# Patient Record
Sex: FEMALE | Race: WHITE | NOT HISPANIC OR LATINO | Employment: FULL TIME | URBAN - METROPOLITAN AREA
[De-identification: names, ages, dates, MRNs, and addresses within clinical notes are randomized per-mention and may not be internally consistent; named-entity substitution may affect disease eponyms.]

---

## 2023-04-03 ENCOUNTER — HOSPITAL ENCOUNTER (OUTPATIENT)
Dept: DATA CONVERSION | Facility: HOSPITAL | Age: 68
End: 2023-04-03
Attending: ORTHOPAEDIC SURGERY | Admitting: ORTHOPAEDIC SURGERY
Payer: COMMERCIAL

## 2023-04-03 DIAGNOSIS — I10 ESSENTIAL (PRIMARY) HYPERTENSION: ICD-10-CM

## 2023-04-03 DIAGNOSIS — M75.102 UNSPECIFIED ROTATOR CUFF TEAR OR RUPTURE OF LEFT SHOULDER, NOT SPECIFIED AS TRAUMATIC: ICD-10-CM

## 2023-04-03 DIAGNOSIS — F41.9 ANXIETY DISORDER, UNSPECIFIED: ICD-10-CM

## 2023-04-03 DIAGNOSIS — Z87.891 PERSONAL HISTORY OF NICOTINE DEPENDENCE: ICD-10-CM

## 2023-04-03 DIAGNOSIS — S46.012A STRAIN OF MUSCLE(S) AND TENDON(S) OF THE ROTATOR CUFF OF LEFT SHOULDER, INITIAL ENCOUNTER: ICD-10-CM

## 2023-04-03 DIAGNOSIS — S43.432A SUPERIOR GLENOID LABRUM LESION OF LEFT SHOULDER, INITIAL ENCOUNTER: ICD-10-CM

## 2023-04-03 DIAGNOSIS — S46.112A STRAIN OF MUSCLE, FASCIA AND TENDON OF LONG HEAD OF BICEPS, LEFT ARM, INITIAL ENCOUNTER: ICD-10-CM

## 2023-04-03 DIAGNOSIS — M94.212 CHONDROMALACIA, LEFT SHOULDER: ICD-10-CM

## 2023-04-03 DIAGNOSIS — K21.9 GASTRO-ESOPHAGEAL REFLUX DISEASE WITHOUT ESOPHAGITIS: ICD-10-CM

## 2023-09-14 NOTE — H&P
History & Physical Reviewed:   I have reviewed the History and Physical dated:  30-Mar-2023   History and Physical reviewed and relevant findings noted. Patient examined to review pertinent physical  findings.: No significant changes   Home Medications Reviewed: no changes noted   Allergies Reviewed: no changes noted       ERAS (Enhanced Recovery After Surgery):  ·  ERAS Patient: no     Consent:   COVID-19 Consent:  ·  COVID-19 Risk Consent Surgeon has reviewed key risks related to the risk of racheal COVID-19 and if they contract COVID-19 what the risks are.       Electronic Signatures:  Franko Millard)  (Signed 03-Apr-2023 08:03)   Authored: History & Physical Reviewed, ERAS, Consent,  Note Completion      Last Updated: 03-Apr-2023 08:03 by Franko Millard)

## 2023-10-02 NOTE — OP NOTE
Post Operative Note:     PreOp Diagnosis: L SHOULDER RTC TEAR, BICEPS TENODESIS   Post-Procedure Diagnosis: same   Procedure: L S SCOPE RTC REPAIR, BICEPS TENODESIS,  EXT LUCAS, SAD   Surgeon: ELSY   Resident/Fellow/Other Assistant: TARA   Anesthesia: GEN/SCALENE   Estimated Blood Loss (mL): 3   Specimen: no   Findings: SEE DX     Attestation:   Note Completion:  Attending Attestation I performed the procedure without a resident         Electronic Signatures:  Franko Millard)  (Signed 03-Apr-2023 09:37)   Authored: Post Operative Note, Note Completion      Last Updated: 03-Apr-2023 09:37 by Franko Millard)

## 2024-01-10 NOTE — PROGRESS NOTES
[facility]  Bianca Cornell female   1955 68 y.o.   [id number]      Chief Complaint  Follow up annual mammogram and exam.     History Of Present Illness  Bianca Cornell is a very pleasant 68 y.o.  female followed in the breast center for high risk surveillance care. She was diagnosed with right breast atypical ductal hyperplasia (ADH). On 2022 Dr. Kendrick performed a right breast excisional biopsy. She has no family history of breast cancer.     BREAST IMAGIN2024 Left diagnostic mammogram with ultrasound, indicates BI-RADS Category 4. Suspicious left breast mass without axillary lymphadenopathy. Further evaluation with surgical consultation and ultrasound-guided biopsy is recommended.     REPRODUCTIVE HISTORY: menarche age 15, , first birth age 18, did not breastfeed, no OCP's, natural menopause age 51, no HRT, heterogeneously dense    FAMILY CANCER HISTORY:   none    Review of Systems  Constitutional:  Negative for appetite change, fatigue, fever and unexpected weight change.   HENT:  Negative for ear pain, hearing loss, nosebleeds, sore throat and trouble swallowing.    Eyes:  Negative for discharge, itching and visual disturbance.   Breast: As stated in HPI.  Respiratory:  Negative for cough, chest tightness and shortness of breath.    Cardiovascular:  Negative for chest pain, palpitations and leg swelling.   Gastrointestinal:  Negative for abdominal pain, constipation, diarrhea and nausea.   Endocrine: Negative for cold intolerance and heat intolerance.   Genitourinary:  Negative for dysuria, frequency, hematuria, pelvic pain and vaginal bleeding.   Musculoskeletal:  Negative for arthralgias, back pain, gait problem, joint swelling and myalgias.   Skin:  Negative for color change and rash.   Allergic/Immunologic: Negative for environmental allergies and food allergies.   Neurological:  Negative for dizziness, tremors, speech difficulty, weakness, numbness and  headaches.   Hematological:  Does not bruise/bleed easily.   Psychiatric/Behavioral:  Negative for agitation, dysphoric mood and sleep disturbance. The patient is not nervous/anxious.       Past Medical History  She has a past medical history of Personal history of other diseases of the female genital tract and Unspecified benign mammary dysplasia of unspecified breast (2021).    Surgical History  She has a past surgical history that includes Appendectomy (2013); Other surgical history (2021); and BI mammo guided breast right localization (Right, 2022).     Family History  Cancer-related family history is not on file.     Social History  Social History     Tobacco Use    Smoking status: Former     Types: Cigarettes     Quit date: 2014     Years since quittin.0    Smokeless tobacco: Not on file   Substance Use Topics    Alcohol use: Not on file        Allergies  Allergies   Allergen Reactions    Clindamycin Nausea/vomiting     oral    Lisinopril Nausea/vomiting    Morphine Rash and Nausea/vomiting     Medications  Current Outpatient Medications   Medication Instructions    doxycycline (ADOXA) 100 mg, oral, 2 times daily, Take with a full glass of water and do not lie down for at least 30 minutes after    hydrALAZINE (APRESOLINE) 25 mg, oral    sulfamethoxazole-trimethoprim (Bactrim DS) 800-160 mg tablet 1 tablet, oral, 2 times daily      Last Recorded Vitals  Blood pressure 138/82, pulse 69, temperature 36 °C (96.8 °F), weight 97.5 kg (215 lb).    Physical Exam  Chest:       Patient is alert and oriented x3 and in a relaxed and appropriate mood. Her gait is steady and hand grasps are equal. Sclera is clear. The breasts are nearly symmetrical, large, and pendulous. Right breast has a well healed excisional biopsy incision. The tissue is soft without palpable abnormalities, discrete nodules or masses. The skin and nipples appear normal. There is no cervical, supraclavicular or axillary  lymphadenopathy. Heart rate and rhythm normal, S1 and S2 appreciated. The lungs are clear to auscultation bilaterally. Abdomen is soft and non-tender.      Relevant Results and Imaging  Study Result    Narrative & Impression   Interpreted By:  Salima Mendoza and Avery Ross   STUDY:  BI MAMMO LEFT DIAGNOSTIC TOMOSYNTHESIS; BI US BREAST LIMITED LEFT;  1/11/2024 11:59 am; 1/11/2024 12:32 pm      ACCESSION NUMBER(S):  [id number]; [id number]      ORDERING CLINICIAN:  ILIANA BEE      INDICATION:  The patient was recalled from recent screening mammogram 01/11/2024  for a left breast mass.      COMPARISON:  01/11/2024, 08/30/2022, 01/21/2022.      FINDINGS:  MAMMOGRAPHY: 2D and tomosynthesis images were reviewed at 1 mm slice  thickness.      Density:  The breast tissue is heterogeneously dense, which may  obscure small masses.      Spot compression views demonstrate an irregular spiculated equal  density mass in the central medial left breast at anterior depth.      ULTRASOUND:  A targeted ultrasound of the left breast and axilla was  performed using elastography.      An irregular not parallel hypoechoic mass with angular margins and  posterior shadowing is seen at 9 o'clock 5 cm from the nipple. The  mass measures 0.3 x 0.2 x 0.4 cm. It is avascular and soft on  elastography. This corresponds with the mammographic left breast mass.      A targeted ultrasound of the left axilla demonstrates 3  morphologically normal axillary lymph nodes.      IMPRESSION:  Suspicious left breast mass without axillary lymphadenopathy. Further  evaluation with surgical consultation and ultrasound-guided biopsy is  recommended. The patient is scheduled to see Iliana Smith in  clinic today to discuss the findings and recommendations. A message  was sent to the referring practitioner at the time of this dictation  regarding these critical findings using the Epic notification system.  A pre-procedure form was filled out.       Method of Detection: Category Sdbt - 3D Screening      BI-RADS CATEGORY:      BI-RADS Category:  4 Suspicious.  Recommendation:  Biopsy.  Recommended Date:  Immediate.  Laterality:  Left.      For any future breast imaging appointments, please call [phone] ([phone]).      I personally reviewed the images/study and I agree with the findings  as stated by fellow physician, Dr. Donnell Mcgraw.          MACRO:  Critical Finding:  Breast Imaging Abnormality. Notification was  initiated on 1/11/2024 at 12:35 pm by  Donnell Mcgraw.  (**-YCF-**)  Instructions:  See Impression for specific recommendations.           Time was spent viewing digital images. I explained the results in depth, along with suggested explanation for follow up recommendations based on the testing results. BI-RADS Category 4    Orders  Orders Placed This Encounter   Procedures    BI mammo left diagnostic tomosynthesis     Standing Status:   Future     Number of Occurrences:   1     Standing Expiration Date:   3/11/2025     Order Specific Question:   Reason for exam:     Answer:   left breast mass     Order Specific Question:   Radiologist to Determine Optimal Study     Answer:   Yes     Order Specific Question:   Release result to [employer]     Answer:   Immediate [1]     Order Specific Question:   Is this exam part of a Research Study? If Yes, link this order to the research study     Answer:   No    BI US breast limited left     Standing Status:   Future     Number of Occurrences:   1     Standing Expiration Date:   3/11/2025     Order Specific Question:   Reason for exam:     Answer:   left breast mass     Order Specific Question:   Radiologist to Determine Optimal Study     Answer:   Yes     Order Specific Question:   Release result to [facility]     Answer:   Immediate [1]     Order Specific Question:   Is this exam part of a Research Study? If Yes, link this order to the research study     Answer:   No    BI US guided breast localization and biopsy left      Standing Status:   Future     Standing Expiration Date:   3/11/2025     Order Specific Question:   Reason for exam:     Answer:   abnormal mammogram     Order Specific Question:   Radiologist to Determine Optimal Study     Answer:   Yes     Order Specific Question:   Release result to [facility]     Answer:   Immediate     Order Specific Question:   Is this exam part of a Research Study? If Yes, link this order to the research study     Answer:   No        Visit Diagnosis  1. Breast cancer screening, high risk patient        2. Abnormal finding on breast imaging  BI mammo left diagnostic tomosynthesis    BI US breast limited left      3. Atypical ductal hyperplasia of breast        4. Dense breast tissue          Assessment/Plan  High risk surveillance care, left breast mass, history right excisional breast biopsy for ADH, no family history of breast cancer, heterogeneously dense    Plan:  Left breast ultrasound guided core biopsy. Declines endocrine therapy. Declines MRI d/t gadolinium reaction of hives.     Patient Discussion/Summary  Left breast ultrasound guided core biopsy. A breast radiology physician will perform the procedure. Possible diagnoses include benign, atypia or cancer. Bruising and mild discomfort after the biopsy is normal and will improve. I typically have results in 5-7 business days. I will call you with the results, please have your phone handy to take my call. I will provide recommendations for future follow up based on your biopsy results.     High risk breast surveillance care plan:  Yearly mammogram with digital breast tomosynthesis  Twice yearly clinical breast examinations  Breast MRI (to schedule call [phone])  Monthly self breast examinations &/or regular self breast awareness  Vitamin D3 2000 IU/daily (over the counter) unless your PCP recommends you take a specific dose  Exercise 3-4 times per week for 45-60 minutes  Limit alcohol to 3-4 drinks per week if you are  menopausal  Eat healthy low-fat diet with lots of vegetable & fruits  Risk models indicate personal risk of breast cancer in the next 5 years and  lifetime (age 85-90):  Breast Cancer Risk Assessment Tool (Lizet): 5-year risk 2.6% (average 2.1%), lifetime risk 8.6% (average 7.2%).   Logan-Sunny: 5-year risk 10.8% (average 1.7%), lifetime risk 29%, (average 5.2%)    Risk model indicates you are eligible for endocrine therapy with Tamoxifen, Raloxifene or Aromatase inhibitor. Endocrine therapy reduces lifetime risk of breast cancer by up to 50% when taken for 5 years. There is an alternative low dose Tamoxifen which can be taken for only 3 years.      IMPORTANT INFORMATION REGARDING YOUR RESULTS    You can see your health information, review clinical summaries from office visits & test results online when you follow your health with MY  Chart, a personal health record. To sign up go to [email or web address]. If you need assistance with signing up or trouble getting into your account call [facility] Patient Line 24/7 at [phone].    My office phone number is [phone] if you need to get in touch with me or have additional questions or concerns. Thank you for choosing [facility] and trusting me as your healthcare provider. I look forward to seeing you again at your next office visit. I am honored to be a provider on your health care team and I remain dedicated to helping you achieve your health goals.    Lora Zhou, APRN-CNP

## 2024-01-11 ENCOUNTER — OFFICE VISIT (OUTPATIENT)
Dept: SURGICAL ONCOLOGY | Facility: HOSPITAL | Age: 69
End: 2024-01-11
Payer: COMMERCIAL

## 2024-01-11 ENCOUNTER — TELEPHONE (OUTPATIENT)
Dept: RADIOLOGY | Facility: HOSPITAL | Age: 69
End: 2024-01-11

## 2024-01-11 ENCOUNTER — HOSPITAL ENCOUNTER (OUTPATIENT)
Dept: RADIOLOGY | Facility: HOSPITAL | Age: 69
Discharge: HOME | End: 2024-01-11
Payer: COMMERCIAL

## 2024-01-11 VITALS
HEART RATE: 69 BPM | WEIGHT: 215 LBS | BODY MASS INDEX: 33.67 KG/M2 | TEMPERATURE: 96.8 F | DIASTOLIC BLOOD PRESSURE: 82 MMHG | SYSTOLIC BLOOD PRESSURE: 138 MMHG

## 2024-01-11 DIAGNOSIS — R92.8 ABNORMAL FINDING ON BREAST IMAGING: ICD-10-CM

## 2024-01-11 DIAGNOSIS — N60.99 ATYPICAL DUCTAL HYPERPLASIA OF BREAST: ICD-10-CM

## 2024-01-11 DIAGNOSIS — Z12.39 BREAST CANCER SCREENING, HIGH RISK PATIENT: Primary | ICD-10-CM

## 2024-01-11 DIAGNOSIS — R92.30 DENSE BREAST TISSUE: ICD-10-CM

## 2024-01-11 DIAGNOSIS — Z12.31 SCREENING MAMMOGRAM FOR HIGH-RISK PATIENT: ICD-10-CM

## 2024-01-11 PROCEDURE — 99214 OFFICE O/P EST MOD 30 MIN: CPT | Performed by: NURSE PRACTITIONER

## 2024-01-11 PROCEDURE — 3008F BODY MASS INDEX DOCD: CPT | Performed by: NURSE PRACTITIONER

## 2024-01-11 PROCEDURE — 77067 SCR MAMMO BI INCL CAD: CPT

## 2024-01-11 PROCEDURE — 77067 SCR MAMMO BI INCL CAD: CPT | Mod: DISTINCT PROCEDURAL SERVICE | Performed by: RADIOLOGY

## 2024-01-11 PROCEDURE — 77061 BREAST TOMOSYNTHESIS UNI: CPT | Mod: LT,GG

## 2024-01-11 PROCEDURE — 1159F MED LIST DOCD IN RCRD: CPT | Performed by: NURSE PRACTITIONER

## 2024-01-11 PROCEDURE — 1126F AMNT PAIN NOTED NONE PRSNT: CPT | Performed by: NURSE PRACTITIONER

## 2024-01-11 PROCEDURE — 76642 ULTRASOUND BREAST LIMITED: CPT | Mod: LT

## 2024-01-11 PROCEDURE — 1160F RVW MEDS BY RX/DR IN RCRD: CPT | Performed by: NURSE PRACTITIONER

## 2024-01-11 PROCEDURE — 76982 USE 1ST TARGET LESION: CPT | Mod: LT

## 2024-01-11 PROCEDURE — 77063 BREAST TOMOSYNTHESIS BI: CPT | Mod: DISTINCT PROCEDURAL SERVICE | Performed by: RADIOLOGY

## 2024-01-11 RX ORDER — HYDRALAZINE HYDROCHLORIDE 25 MG/1
25 TABLET, FILM COATED ORAL 3 TIMES DAILY
COMMUNITY

## 2024-01-11 RX ORDER — SULFAMETHOXAZOLE AND TRIMETHOPRIM 800; 160 MG/1; MG/1
1 TABLET ORAL DAILY
COMMUNITY

## 2024-01-11 RX ORDER — DOXYCYCLINE 100 MG/1
100 TABLET ORAL DAILY
COMMUNITY

## 2024-01-11 ASSESSMENT — ENCOUNTER SYMPTOMS
OCCASIONAL FEELINGS OF UNSTEADINESS: 1
LOSS OF SENSATION IN FEET: 0
DEPRESSION: 0

## 2024-01-11 ASSESSMENT — PAIN SCALES - GENERAL: PAINLEVEL: 0-NO PAIN

## 2024-01-11 NOTE — TELEPHONE ENCOUNTER
Spoke with pt about date, time location and parking, aware it is okay to eat drink and drive as long as not taking sedating medications or having another appointment with different instructions, reviewed pre, post and discharge instructions, instructed to wear most supportive bra and tylenol for discomfort. Verbalizing understanding , no questions allergies reviewed, denies use of blood thinning medications, does use fish oil, okay with lidocaine,

## 2024-01-12 ENCOUNTER — HOSPITAL ENCOUNTER (OUTPATIENT)
Dept: RADIOLOGY | Facility: HOSPITAL | Age: 69
Discharge: HOME | End: 2024-01-12
Payer: COMMERCIAL

## 2024-01-12 DIAGNOSIS — R92.8 ABNORMAL FINDING ON BREAST IMAGING: ICD-10-CM

## 2024-01-12 DIAGNOSIS — R92.8 OTHER ABNORMAL AND INCONCLUSIVE FINDINGS ON DIAGNOSTIC IMAGING OF BREAST: ICD-10-CM

## 2024-01-12 PROCEDURE — 88360 TUMOR IMMUNOHISTOCHEM/MANUAL: CPT | Performed by: PATHOLOGY

## 2024-01-12 PROCEDURE — 19081 BX BREAST 1ST LESION STRTCTC: CPT | Mod: LT

## 2024-01-12 PROCEDURE — 77065 DX MAMMO INCL CAD UNI: CPT | Mod: LEFT SIDE | Performed by: STUDENT IN AN ORGANIZED HEALTH CARE EDUCATION/TRAINING PROGRAM

## 2024-01-12 PROCEDURE — 88305 TISSUE EXAM BY PATHOLOGIST: CPT | Performed by: PATHOLOGY

## 2024-01-12 PROCEDURE — 2720000007 HC OR 272 NO HCPCS

## 2024-01-12 PROCEDURE — 77065 DX MAMMO INCL CAD UNI: CPT

## 2024-01-12 PROCEDURE — 88360 TUMOR IMMUNOHISTOCHEM/MANUAL: CPT | Mod: TC,SUR | Performed by: NURSE PRACTITIONER

## 2024-01-12 PROCEDURE — 2500000005 HC RX 250 GENERAL PHARMACY W/O HCPCS: Performed by: STUDENT IN AN ORGANIZED HEALTH CARE EDUCATION/TRAINING PROGRAM

## 2024-01-12 PROCEDURE — 19081 BX BREAST 1ST LESION STRTCTC: CPT | Mod: LEFT SIDE | Performed by: STUDENT IN AN ORGANIZED HEALTH CARE EDUCATION/TRAINING PROGRAM

## 2024-01-12 RX ADMIN — Medication 21 ML: at 10:40

## 2024-01-12 RX ADMIN — Medication 21 ML: at 11:40

## 2024-01-12 ASSESSMENT — PAIN - FUNCTIONAL ASSESSMENT: PAIN_FUNCTIONAL_ASSESSMENT: 0-10

## 2024-01-12 ASSESSMENT — PAIN SCALES - GENERAL: PAINLEVEL_OUTOF10: 0 - NO PAIN

## 2024-01-12 NOTE — Clinical Note
1030,reviewed US biopsy, allergies, okay with lidocaine , stopped fish oil 2 days ago  no other blood thinning medications, due to medical history may bleed easy, feels safe at home has not fallen in past 3 months , has fear of falling, no pain except joint pain uses tylenol, comfort measures provided, positioned, doctors unable to target area, reviewed with patient now would like to do a stereo biopsy, patient moved to stereo room, rev iewed procedure, verbalizing understanding, 1129 doctors in room, consent completed, 1130 patient positioned and site cleansed , 1140 lidocaine given by Dr. Mcgraw, biopsy completed pain 1/10, pressure held for 10 min, debrief @1053, doctors out of room 1054, site without bleeding steri strips and DSD applied, clip films completed and okayed, patient to prep recovery, reviewed discharge instructions given pamphlet and provider and nurse  line numbers, demonstrated ice placement, assisted with dressing, pt bought own surgical bra, verbalizing understanding no questions, pain 1/10 site remains D&I

## 2024-01-16 ENCOUNTER — TELEPHONE (OUTPATIENT)
Dept: SURGICAL ONCOLOGY | Facility: HOSPITAL | Age: 69
End: 2024-01-16
Payer: COMMERCIAL

## 2024-01-16 NOTE — TELEPHONE ENCOUNTER
Result Communication    Spoke with Bianca Cornell regarding breast biopsy results showing cancer. Office will call patient to schedule a surgical consult with the breast surgeon.     Resulted Orders   Surgical Pathology Exam   Result Value Ref Range    Case Report       Surgical Pathology                                Case: [id number]                                  Authorizing Provider:  FLORENCIA Hernandez    Collected:           01/12/2024 1144              Ordering Location:     [facility]       Received:            01/12/2024 1651                                     Center                                                                       Pathologist:           Colleen Wilson MD                                                         Specimen:    BREAST CORE BIOPSY LEFT, Left breast mass                                                  FINAL DIAGNOSIS       A.  Left breast mass, stereotactic guided core needle biopsy:  -- Invasive ductal carcinoma, grade 2, see note.    Note:  In this limited sample, the invasive carcinoma measures up to 4 mm in greatest dimension.  ER, MN and HER2 will be reported in an addendum.                By the signature on this report, the individual or group listed as making the Final Interpretation/Diagnosis certifies that they have reviewed this case.       Clinical History       Left breast stereotactic guided core biopsy (vacuum assisted) for left breast mass.       Gross Description       A: Received in formalin, labeled with the patient´s name and hospital number, are multiple irregular/cylindrical segments of yellow-white fatty soft tissue aggregating to 3.0 x 2.8 x 0.6 cm.  The specimen is submitted in toto in 2 cassettes.  M[facility]/RCC    NOTE:  Ischemia time: Not provided.  This specimen was placed into formalin at: 1/12/24 11:44.      Disclaimer       One or more of the reagents used to perform assays on this specimen MAY have contained components  considered to be analyte specific reagents (ASR's).  ASR's have not been cleared or approved by the U.S. Food and Drug Administration.  These assays were developed and their performance characteristics determined by the Department of Pathology at [facility]. The FDA does not require this test to go through premarket FDA review. This test is used for clinical purposes. It should not be regarded as investigational or for research. This laboratory is certified under the Clinical Laboratory Improvement Amendments (CLIA) as qualified to perform high complexity clinical laboratory testing.  The assays were performed with appropriate positive and negative controls which stained appropriately.         11:06 AM

## 2024-01-18 LAB
LAB AP ASR DISCLAIMER: NORMAL
LABORATORY COMMENT REPORT: NORMAL
PATH REPORT.ADDENDUM SPEC: NORMAL
PATH REPORT.FINAL DX SPEC: NORMAL
PATH REPORT.GROSS SPEC: NORMAL
PATH REPORT.RELEVANT HX SPEC: NORMAL
PATH REPORT.TOTAL CANCER: NORMAL

## 2024-01-24 ENCOUNTER — LAB (OUTPATIENT)
Dept: LAB | Facility: LAB | Age: 69
End: 2024-01-24
Payer: COMMERCIAL

## 2024-01-24 DIAGNOSIS — D80.1 NONFAMILIAL HYPOGAMMAGLOBULINEMIA (MULTI): Primary | ICD-10-CM

## 2024-01-24 LAB
ALBUMIN SERPL BCP-MCNC: 4 G/DL (ref 3.4–5)
ALP SERPL-CCNC: 71 U/L (ref 33–136)
ALT SERPL W P-5'-P-CCNC: 18 U/L (ref 7–45)
ANION GAP SERPL CALC-SCNC: 12 MMOL/L (ref 10–20)
AST SERPL W P-5'-P-CCNC: 15 U/L (ref 9–39)
BASOPHILS # BLD AUTO: 0.08 X10*3/UL (ref 0–0.1)
BASOPHILS NFR BLD AUTO: 0.9 %
BILIRUB SERPL-MCNC: 0.4 MG/DL (ref 0–1.2)
BUN SERPL-MCNC: 18 MG/DL (ref 6–23)
CALCIUM SERPL-MCNC: 9.7 MG/DL (ref 8.6–10.3)
CHLORIDE SERPL-SCNC: 103 MMOL/L (ref 98–107)
CO2 SERPL-SCNC: 26 MMOL/L (ref 21–32)
CREAT SERPL-MCNC: 0.84 MG/DL (ref 0.5–1.05)
EGFRCR SERPLBLD CKD-EPI 2021: 76 ML/MIN/1.73M*2
EOSINOPHIL # BLD AUTO: 0.27 X10*3/UL (ref 0–0.7)
EOSINOPHIL NFR BLD AUTO: 3.1 %
ERYTHROCYTE [DISTWIDTH] IN BLOOD BY AUTOMATED COUNT: 13.2 % (ref 11.5–14.5)
GLUCOSE SERPL-MCNC: 139 MG/DL (ref 74–99)
HCT VFR BLD AUTO: 41.6 % (ref 36–46)
HGB BLD-MCNC: 14.1 G/DL (ref 12–16)
IGA SERPL-MCNC: 275 MG/DL (ref 70–400)
IGG SERPL-MCNC: 805 MG/DL (ref 700–1600)
IGM SERPL-MCNC: 225 MG/DL (ref 40–230)
IMM GRANULOCYTES # BLD AUTO: 0.03 X10*3/UL (ref 0–0.7)
IMM GRANULOCYTES NFR BLD AUTO: 0.3 % (ref 0–0.9)
LYMPHOCYTES # BLD AUTO: 3.33 X10*3/UL (ref 1.2–4.8)
LYMPHOCYTES NFR BLD AUTO: 38.7 %
MCH RBC QN AUTO: 29.3 PG (ref 26–34)
MCHC RBC AUTO-ENTMCNC: 33.9 G/DL (ref 32–36)
MCV RBC AUTO: 86 FL (ref 80–100)
MONOCYTES # BLD AUTO: 0.51 X10*3/UL (ref 0.1–1)
MONOCYTES NFR BLD AUTO: 5.9 %
NEUTROPHILS # BLD AUTO: 4.39 X10*3/UL (ref 1.2–7.7)
NEUTROPHILS NFR BLD AUTO: 51.1 %
NRBC BLD-RTO: 0 /100 WBCS (ref 0–0)
PLATELET # BLD AUTO: 302 X10*3/UL (ref 150–450)
POTASSIUM SERPL-SCNC: 4.3 MMOL/L (ref 3.5–5.3)
PROT SERPL-MCNC: 7 G/DL (ref 6.4–8.2)
RBC # BLD AUTO: 4.82 X10*6/UL (ref 4–5.2)
SODIUM SERPL-SCNC: 137 MMOL/L (ref 136–145)
WBC # BLD AUTO: 8.6 X10*3/UL (ref 4.4–11.3)

## 2024-01-24 PROCEDURE — 85025 COMPLETE CBC W/AUTO DIFF WBC: CPT

## 2024-01-24 PROCEDURE — 82784 ASSAY IGA/IGD/IGG/IGM EACH: CPT

## 2024-01-24 PROCEDURE — 86317 IMMUNOASSAY INFECTIOUS AGENT: CPT

## 2024-01-24 PROCEDURE — 36415 COLL VENOUS BLD VENIPUNCTURE: CPT

## 2024-01-24 PROCEDURE — 80053 COMPREHEN METABOLIC PANEL: CPT

## 2024-01-27 LAB
S PN DA SERO 19F IGG SER-MCNC: 4.58 UG/ML
S PNEUM DA 1 IGG SER-MCNC: 0.81 UG/ML
S PNEUM DA 12F IGG SER-MCNC: 0.41 UG/ML
S PNEUM DA 14 IGG SER-MCNC: 8.43 UG/ML
S PNEUM DA 18C IGG SER-MCNC: 0.35 UG/ML
S PNEUM DA 23F IGG SER-MCNC: 1.73 UG/ML
S PNEUM DA 3 IGG SER-MCNC: 4.19 UG/ML
S PNEUM DA 4 IGG SER-MCNC: 0.61 UG/ML
S PNEUM DA 5 IGG SER-MCNC: 0.12 UG/ML
S PNEUM DA 6B IGG SER-MCNC: 0.57 UG/ML
S PNEUM DA 7F IGG SER-MCNC: 0.59 UG/ML
S PNEUM DA 8 IGG SER-MCNC: 0.33 UG/ML
S PNEUM DA 9N IGG SER-MCNC: 5.14 UG/ML
S PNEUM DA 9V IGG SER-MCNC: 1.16 UG/ML
S PNEUM SEROTYPE IGG SER-IMP: NORMAL

## 2024-01-31 NOTE — PROGRESS NOTES
BREAST SURGICAL ONCOLOGY NEW CANCER DIAGNOSIS    Assessment/Plan     Left breast IDC g2 ER 95 WY 95% HER2- at 9:00 5cmFN measuring 0.5cm  -[id number] stage IA    Discussed the pathology and treatment options with the patient and family.    First, we discussed surgical options: breast conservation vs mastectomy.      Breast conservation involves removal of the tumor with a rim of normal breast tissue called a margin.  BCT is often done in conjunction with radiation to decrease risk of recurrence.  Mastectomy involves removal of all breast tissue.  When mastectomy is recommended, referral to a plastic surgeon for reconstruction is recommended.      Given the small size of her cancer, she would be an excellent candidate for breast conservation.    We then discussed the role of axillary staging. We discussed sentinel lymph node biopsy, indications for axillary node dissection and risks and benefits of each procedure.     Next, we discussed adjuvant therapies to surgery.      Radiation is usually recommended when BCT is performed.  Women 70 or greater with early stage hormone receptor positive cancer may be able to omit radiation.  Radiation is recommended after mastectomy for tumors larger than 5cm, positive lymph nodes, or positive margins.      The need for chemotherapy will be based on surgical pathology and will be decided by the medical oncologist.  The medical oncologist is also responsible for prescribing endocrine therapy for hormone receptor positive tumors.     Bianca will be scheduled for left Magseed localized partial mastectomy and axillary sentinel lymph node biopsy on 3/1/2024.    She has an appointment with her immunologist, Dr. Denise, on 2/8/2024 to determine if she needs any additional intervention pre-operatively to minimize surgical site infection.  She is already on long term use antibiotics.      Risks, benefits, alternatives and perioperative expectations were discussed in detail including:  bleeding, infection, positive margin status necessitating return to OR, injury to nearby structures and risk of lymphedema with axillary surgery.    Subjective   Bianca Cornell is a 68 y.o. female presents today for evaluation of recently diagnosed carcinoma of the left breast.     The patient was initially referred for evaluation of an abnormal mammogram first noted 1/11/2024.     Follow-up diagnostic imaging on 1/11/2024 reveals a left breast mass at 9:0 5cmFN measuring 0.5cm.  Axilla scanned and WNL.     A stereotactic biopsy was performed on 1/12/2024 with pathology revealing infiltrating ductal carcinoma  rdGrdrrdarddrderd:rd3rd ER: 95% NV: 95% HER2: negative  ** Clip migration noted at time of biopsy, needs mamm loc for magseed**    Menarche: 15  AFLB: 18  Menopause: 51  HRT: no  Previous biopsies: Yes - right breast atypia  Previous breast surgeries: Yes - right MS excisional bx with Dr. Kendrick 2021.  Prior breast cancer: No    Family history: none    Review of Systems   Constitutional symptoms: Denies generalized fatigue.  Denies weight change, fevers/chills, difficulty sleeping   Eyes: Denies double vision, glaucoma, cataracts.  Ear/nose/throat/mouth: Denies hearing changes, sore throat, sinus problems.  Cardiovascular: No chest pain.  Denies irregular heartbeat.  Denies ankle swelling.  Respiratory: No wheezing, cough, or shortness of breath.  Gastrointestinal: No abdominal pain,  No nausea/vomiting.  No indigestion/heartburn.  No change in bowel habits.  No constipation or diarrhea.   Genitourinary: No urinary incontinence.  No urinary frequency.  No painful urination.  Musculoskeletal: No bone pain, no muscle pain, no joint pain.   Integumentary: No rash. No masses.  No changes in moles.  No easy bruising.  Neurological: No headaches.  No tremors. No numbness/tingling.  Psychiatric: No anxiety. No depression.  Endocrine: No excessive thirst.  Not too hot or too cold.  Not tired or fatigued.     Hematological/lymphatic: No swollen glands or blood clotting problems.  No bruising.    Objective   Physical Exam  General: Alert and oriented x 3.  Mood and affect are appropriate.  HEENT: EOMI, PERRLA.   Neck: supple, no masses, no cervical adenopathy.  Cardiovascular: no lower extremity edema.  Pulmonary: breathing non labored on room air.  GI: Abdomen soft, no masses. No hepatomegaly or splenomegaly.  Lymph nodes: No supraclavicular or axillary adenopathy bilaterally.  Musculoskeletal: Full range of motion in the upper extremities bilaterally.  Neuro: denies dizziness, tremors    Breasts: The breasts were examined in both the seated and supine positions.    RIGHT: The nipple is everted without nipple discharge.  There are no skin changes, skin thickening, or dimpling. There are no masses palpated in the RIGHT breast.   LEFT: The nipple is everted without nipple discharge.  There are no skin changes, skin thickening, or dimpling. There are no masses palpated in the LEFT breast.     Radiology review: All images and reports were personally reviewed.         Constance Sweeney, DO

## 2024-02-01 ENCOUNTER — OFFICE VISIT (OUTPATIENT)
Dept: SURGICAL ONCOLOGY | Facility: HOSPITAL | Age: 69
End: 2024-02-01
Payer: COMMERCIAL

## 2024-02-01 ENCOUNTER — APPOINTMENT (OUTPATIENT)
Dept: SURGICAL ONCOLOGY | Facility: HOSPITAL | Age: 69
End: 2024-02-01
Payer: COMMERCIAL

## 2024-02-01 VITALS — BODY MASS INDEX: 33.43 KG/M2 | WEIGHT: 213 LBS | HEIGHT: 67 IN | RESPIRATION RATE: 16 BRPM

## 2024-02-01 DIAGNOSIS — C50.112 MALIGNANT NEOPLASM OF CENTRAL PORTION OF LEFT BREAST IN FEMALE, ESTROGEN RECEPTOR POSITIVE (MULTI): ICD-10-CM

## 2024-02-01 DIAGNOSIS — Z17.0 MALIGNANT NEOPLASM OF CENTRAL PORTION OF LEFT BREAST IN FEMALE, ESTROGEN RECEPTOR POSITIVE (MULTI): ICD-10-CM

## 2024-02-01 PROCEDURE — 1126F AMNT PAIN NOTED NONE PRSNT: CPT | Performed by: SURGERY

## 2024-02-01 PROCEDURE — 3008F BODY MASS INDEX DOCD: CPT | Performed by: SURGERY

## 2024-02-01 PROCEDURE — 1159F MED LIST DOCD IN RCRD: CPT | Performed by: SURGERY

## 2024-02-01 PROCEDURE — 1036F TOBACCO NON-USER: CPT | Performed by: SURGERY

## 2024-02-01 PROCEDURE — 99215 OFFICE O/P EST HI 40 MIN: CPT | Performed by: SURGERY

## 2024-02-01 PROCEDURE — 1160F RVW MEDS BY RX/DR IN RCRD: CPT | Performed by: SURGERY

## 2024-02-01 RX ORDER — ACETAMINOPHEN 500 MG
TABLET ORAL
COMMUNITY
Start: 2021-11-11 | End: 2024-02-14 | Stop reason: ALTCHOICE

## 2024-02-02 ENCOUNTER — APPOINTMENT (OUTPATIENT)
Dept: SURGICAL ONCOLOGY | Facility: HOSPITAL | Age: 69
End: 2024-02-02
Payer: COMMERCIAL

## 2024-02-14 ENCOUNTER — LAB (OUTPATIENT)
Dept: LAB | Facility: LAB | Age: 69
End: 2024-02-14
Payer: COMMERCIAL

## 2024-02-14 ENCOUNTER — PRE-ADMISSION TESTING (OUTPATIENT)
Dept: PREADMISSION TESTING | Facility: HOSPITAL | Age: 69
End: 2024-02-14
Payer: COMMERCIAL

## 2024-02-14 VITALS
SYSTOLIC BLOOD PRESSURE: 135 MMHG | DIASTOLIC BLOOD PRESSURE: 77 MMHG | HEART RATE: 71 BPM | RESPIRATION RATE: 16 BRPM | HEIGHT: 67 IN | WEIGHT: 221.34 LBS | TEMPERATURE: 97.7 F | BODY MASS INDEX: 34.74 KG/M2 | OXYGEN SATURATION: 98 %

## 2024-02-14 DIAGNOSIS — C50.112 MALIGNANT NEOPLASM OF CENTRAL PORTION OF LEFT BREAST IN FEMALE, ESTROGEN RECEPTOR POSITIVE (MULTI): ICD-10-CM

## 2024-02-14 DIAGNOSIS — Z17.0 MALIGNANT NEOPLASM OF CENTRAL PORTION OF LEFT BREAST IN FEMALE, ESTROGEN RECEPTOR POSITIVE (MULTI): ICD-10-CM

## 2024-02-14 PROBLEM — D80.1 HYPOGAMMAGLOBULINEMIA (MULTI): Status: ACTIVE | Noted: 2024-02-14

## 2024-02-14 LAB
ANION GAP SERPL CALC-SCNC: 15 MMOL/L (ref 10–20)
BUN SERPL-MCNC: 14 MG/DL (ref 6–23)
CALCIUM SERPL-MCNC: 10.2 MG/DL (ref 8.6–10.3)
CHLORIDE SERPL-SCNC: 102 MMOL/L (ref 98–107)
CO2 SERPL-SCNC: 25 MMOL/L (ref 21–32)
CREAT SERPL-MCNC: 0.74 MG/DL (ref 0.5–1.05)
EGFRCR SERPLBLD CKD-EPI 2021: 88 ML/MIN/1.73M*2
ERYTHROCYTE [DISTWIDTH] IN BLOOD BY AUTOMATED COUNT: 13.4 % (ref 11.5–14.5)
GLUCOSE SERPL-MCNC: 88 MG/DL (ref 74–99)
HCT VFR BLD AUTO: 42.7 % (ref 36–46)
HGB BLD-MCNC: 14.4 G/DL (ref 12–16)
MCH RBC QN AUTO: 28.8 PG (ref 26–34)
MCHC RBC AUTO-ENTMCNC: 33.7 G/DL (ref 32–36)
MCV RBC AUTO: 85 FL (ref 80–100)
NRBC BLD-RTO: 0 /100 WBCS (ref 0–0)
PLATELET # BLD AUTO: 322 X10*3/UL (ref 150–450)
POTASSIUM SERPL-SCNC: 3.8 MMOL/L (ref 3.5–5.3)
RBC # BLD AUTO: 5 X10*6/UL (ref 4–5.2)
SODIUM SERPL-SCNC: 138 MMOL/L (ref 136–145)
WBC # BLD AUTO: 11.7 X10*3/UL (ref 4.4–11.3)

## 2024-02-14 PROCEDURE — 93010 ELECTROCARDIOGRAM REPORT: CPT | Performed by: INTERNAL MEDICINE

## 2024-02-14 PROCEDURE — 93005 ELECTROCARDIOGRAM TRACING: CPT

## 2024-02-14 PROCEDURE — 80048 BASIC METABOLIC PNL TOTAL CA: CPT

## 2024-02-14 PROCEDURE — 99203 OFFICE O/P NEW LOW 30 MIN: CPT | Performed by: NURSE PRACTITIONER

## 2024-02-14 PROCEDURE — 85027 COMPLETE CBC AUTOMATED: CPT

## 2024-02-14 RX ORDER — GLUCOSAM/CHONDRO/HERB 149/HYAL 750-100 MG
1 TABLET ORAL 2 TIMES DAILY
COMMUNITY

## 2024-02-14 RX ORDER — MULTIVIT-MIN/IRON FUM/FOLIC AC 7.5 MG-4
1 TABLET ORAL DAILY
COMMUNITY

## 2024-02-14 ASSESSMENT — CHADS2 SCORE
CHF: NO
HYPERTENSION: YES
CHADS2 SCORE: 1
PRIOR STROKE OR TIA OR THROMBOEMBOLISM: NO
DIABETES: NO
AGE GREATER THAN OR EQUAL TO 75: NO

## 2024-02-14 ASSESSMENT — DUKE ACTIVITY SCORE INDEX (DASI)
CAN YOU CLIMB A FLIGHT OF STAIRS OR WALK UP A HILL: YES
CAN YOU DO HEAVY WORK AROUND THE HOUSE LIKE SCRUBBING FLOORS OR LIFTING AND MOVING HEAVY FURNITURE: NO
DASI METS SCORE: 6.6
CAN YOU DO MODERATE WORK AROUND THE HOUSE LIKE VACUUMING, SWEEPING FLOORS OR CARRYING GROCERIES: YES
CAN YOU PARTICIPATE IN STRENOUS SPORTS LIKE SWIMMING, SINGLES TENNIS, FOOTBALL, BASKETBALL, OR SKIING: NO
CAN YOU HAVE SEXUAL RELATIONS: NO
CAN YOU WALK A BLOCK OR TWO ON LEVEL GROUND: YES
CAN YOU RUN A SHORT DISTANCE: YES
CAN YOU PARTICIPATE IN MODERATE RECREATIONAL ACTIVITIES LIKE GOLF, BOWLING, DANCING, DOUBLES TENNIS OR THROWING A BASEBALL OR FOOTBALL: NO
CAN YOU TAKE CARE OF YOURSELF (EAT, DRESS, BATHE, OR USE TOILET): YES
TOTAL_SCORE: 31.45
CAN YOU WALK INDOORS, SUCH AS AROUND YOUR HOUSE: YES
CAN YOU DO YARD WORK LIKE RAKING LEAVES, WEEDING OR PUSHING A MOWER: YES
CAN YOU DO LIGHT WORK AROUND THE HOUSE LIKE DUSTING OR WASHING DISHES: YES

## 2024-02-14 ASSESSMENT — ENCOUNTER SYMPTOMS
CARDIOVASCULAR NEGATIVE: 1
EYES NEGATIVE: 1
CONSTITUTIONAL NEGATIVE: 1
GASTROINTESTINAL NEGATIVE: 1
NECK NEGATIVE: 1
ENDOCRINE NEGATIVE: 1
RESPIRATORY NEGATIVE: 1

## 2024-02-14 ASSESSMENT — LIFESTYLE VARIABLES: SMOKING_STATUS: NONSMOKER

## 2024-02-14 ASSESSMENT — PAIN - FUNCTIONAL ASSESSMENT: PAIN_FUNCTIONAL_ASSESSMENT: 0-10

## 2024-02-14 ASSESSMENT — ACTIVITIES OF DAILY LIVING (ADL): ADL_SCORE: 0

## 2024-02-14 NOTE — H&P (VIEW-ONLY)
CPM/PAT Evaluation       Name: Bianca Cornell (Bianca Cornell)  /Age: 1955/69 y.o.     In-Person       Chief Complaint: breast cancer    HPI  This is pleasant 68 yo with Pmhx of anxiety, GERD, HTN, and left side breast cancer.   Pt had abnormal mammogram done  and breast biopsy done  + for invasive ductal carcinoma.  Pt denies recent fever or chills.      Past Medical History:   Diagnosis Date    Atypical ductal hyperplasia, breast     Breast cancer (CMS/HCC)     left    Breast cancer (CMS/HCC)     Hypertension     Hypogammaglobulinemia (CMS/HCC)     Necrotizing fasciitis (CMS/HCC)     right thigh     Personal history of other diseases of the female genital tract     History of endometriosis    PONV (postoperative nausea and vomiting)     Unspecified benign mammary dysplasia of unspecified breast 2021    Atypical ductal hyperplasia of breast       Past Surgical History:   Procedure Laterality Date    APPENDECTOMY  2013    Appendectomy    BI MAMMO GUIDED LOCALIZATION BREAST RIGHT Right 2022    BI MAMMO GUIDED LOCALIZATION BREAST RIGHT 2022 [facility]    BREAST BIOPSY      ENDOMETRIAL ABLATION      OTHER SURGICAL HISTORY  2021    Tubal ligation    SHOULDER SURGERY      TUBAL LIGATION         Patient  has no history on file for sexual activity.    Family History   Problem Relation Name Age of Onset    Diabetes Mother      Hypertension Father      Heart disease Father      Hypertension Brother      Heart attack Brother         Allergies   Allergen Reactions    Clindamycin Nausea/vomiting     Oral - caused pancreatitis per pt    Dexilant [Dexlansoprazole] Nausea/vomiting     caused pancreatitis per pt    Flagyl [Metronidazole] Nausea/vomiting     caused pancreatitis per pt    Lisinopril Nausea/vomiting     caused pancreatitis per pt    Morphine Rash and Nausea/vomiting       Prior to Admission medications    Medication Sig Start Date End Date Taking?  Authorizing Provider   Bacillus subtilis-inulin 1.5 billion cell-1 gram tablet,chewable Chew. 11/11/21   Historical Provider, MD   cholecalciferol (Vitamin D-3) 50 mcg (2,000 unit) capsule Take by mouth. 11/11/21   Historical Provider, MD   doxycycline (Adoxa) 100 mg tablet Take 1 tablet (100 mg) by mouth 2 times a day. Take with a full glass of water and do not lie down for at least 30 minutes after    Historical Provider, MD   hydrALAZINE (Apresoline) 25 mg tablet Take 1 tablet (25 mg) by mouth.    Historical Provider, MD   sulfamethoxazole-trimethoprim (Bactrim DS) 800-160 mg tablet Take 1 tablet by mouth 2 times a day.    Historical Provider, MD OCONNELL ROS:   Constitutional:   neg    Neuro/Psych:    Numbness in fingers  Eyes:   neg     use of corrective lenses  Ears:   neg    Nose:   neg    Mouth:   neg    Throat:   neg    Neck:   neg    Cardio:   neg    Respiratory:   neg    Endocrine:   neg    GI:   neg    :   neg    Musculoskeletal:    Pt has torn single tendon inside hamstring right LE /s/p therapy doing OK walking three miles a day  Hx of fall on left shoulder s/p surgery in April of 2023  Hematologic:   neg    Skin:  neg    ID: pt has hypogammaglobulinemia- recently had vaccines updated  Pt reports she has had multiple complex strep infections in past     Physical Exam  Constitutional:       Appearance: Normal appearance.   HENT:      Head: Normocephalic and atraumatic.      Nose: Nose normal.      Mouth/Throat:      Mouth: Mucous membranes are moist.   Eyes:      Pupils: Pupils are equal, round, and reactive to light.   Cardiovascular:      Rate and Rhythm: Normal rate and regular rhythm.   Pulmonary:      Effort: Pulmonary effort is normal.      Breath sounds: Normal breath sounds.   Abdominal:      General: Bowel sounds are normal.      Palpations: Abdomen is soft.   Musculoskeletal:         General: Normal range of motion.      Cervical back: Normal range of motion.   Skin:     General: Skin is  warm and dry.   Neurological:      General: No focal deficit present.      Mental Status: She is alert and oriented to person, place, and time.   Psychiatric:         Mood and Affect: Mood normal.         Behavior: Behavior normal.        Airway: ROM  Anesthesia:  PONV  Hx of motion sickness  Teeth: upper dentures  ECG: NSR    Visit Vitals  /77   Pulse 71   Temp 36.5 °C (97.7 °F) (Temporal)   Resp 16       DASI Risk Score      Flowsheet Row Most Recent Value   DASI SCORE 31.45   METS Score (Will be calculated only when all the questions are answered) 6.6          Caprini DVT Assessment      Flowsheet Row Most Recent Value   DVT Score 9   Current Status Major surgery planned, including arthroscopic and laproscopic (1-2 hours), Present cancer or chemotherapy   Age 60-75 years   BMI 31-40 (Obesity)          Modified Frailty Index      Flowsheet Row Most Recent Value   Modified Frailty Index Calculator .0909          CHADS2 Stroke Risk  Current as of 19 minutes ago        N/A 3 - 100%: High Risk   2 - 3%: Medium Risk   0 - 2%: Low Risk     Last Change: N/A          This score determines the patient's risk of having a stroke if the patient has atrial fibrillation.        This score is not applicable to this patient. Components are not calculated.          Revised Cardiac Risk Index    No data to display       Apfel Simplified Score    No data to display       Risk Analysis Index Results This Encounter         2/14/2024  1425             ALLRED Cancer History: Patient indicates history of cancer    Total Risk Analysis Index Score Without Cancer: 20    Total Risk Analysis Index Score: 34          Stop Bang Score      Flowsheet Row Most Recent Value   Do you snore loudly? 0   Do you often feel tired or fatigued after your sleep? 1   Has anyone ever observed you stop breathing in your sleep? 0   Do you have or are you being treated for high blood pressure? 1   Recent BMI (Calculated) 34.7   Is BMI greater than 35 kg/m2?  0=No   Age older than 50 years old? 1=Yes   Is your neck circumference greater than 17 inches (Male) or 16 inches (Female)? 0   Gender - Male 0=No   STOP-BANG Total Score 3            Assessment and Plan:     Plan for OR on 3/1/2024 LEFT MAG SEED LOCALIZED PARTIAL MASTECTOMY W/ SENTINEL LYMPH NODE BIOPSY   CBC BMP

## 2024-02-14 NOTE — PREPROCEDURE INSTRUCTIONS
Medication List            Accurate as of February 14, 2024  2:57 PM. Always use your most recent med list.                Bacillus subtilis-inulin 1.5 billion cell-1 gram tablet,chewable  Medication Adjustments for Surgery: Continue until night before surgery     CALCIUM 600 WITH VITAMIN D3 ORAL  Medication Adjustments for Surgery: Stop 7 days before surgery     doxycycline 100 mg tablet  Commonly known as: Adoxa  Notes to patient: Take as instructed     * Fish OiL 1,000 mg (120 mg-180 mg) capsule  Generic drug: omega 3-dha-epa-fish oil  Medication Adjustments for Surgery: Stop 7 days before surgery     * OMEGA-3 ORAL  Medication Adjustments for Surgery: Stop 7 days before surgery     hydrALAZINE 25 mg tablet  Commonly known as: Apresoline  Medication Adjustments for Surgery: Take morning of surgery with sip of water, no other fluids     multivitamin with minerals tablet  Medication Adjustments for Surgery: Stop 7 days before surgery     sulfamethoxazole-trimethoprim 800-160 mg tablet  Commonly known as: Bactrim DS  Notes to patient: Can take as prescribed           * This list has 2 medication(s) that are the same as other medications prescribed for you. Read the directions carefully, and ask your doctor or other care provider to review them with you.                                  PRE-OPERATIVE INSTRUCTIONS    You will receive notification one business day prior to your surgery to confirm your arrival time and additional information. It is important that you answer your phone and/or check your messages during this time.    Please enter the building through the Outpatient entrance. Take the elevator off the lobby to the 2nd floor and check in at the Outpatient Surgery desk    INSTRUCTIONS:  Talk to your surgeon for instructions if you should stop your aspirin, blood thinner, or diabetes medicines.  DO NOT take any multivitamins or over the counter supplements for 7-10 days before surgery.  If not being  admitted, you must have an adult immediately available to drive you home after surgery. We also highly recommend you have someone stay with you for the entire day and night of your surgery.  For children having surgery, a parent or legal guardian must accompany them to the surgery center. If this is not possible, please call [phone] to make additional arrangements.  For adults who are unable to consent or make medical decisions for themselves, a legal guardian or Power of  must accompany them to the surgery center. If this is not possible, please call [phone] to make additional arrangements.  Wear comfortable, loose fitting clothing.  All jewelry and piercings must be removed. If you are unable to remove an item or have a dermal piercing, please be sure to tell the nurse when you arrive for surgery.  Nail polish and make-up must be removed.  Avoid smoking or consuming alcohol for 24 hours before surgery.  To help prevent infection, please take a shower/bath and wash your hair the night before and/or morning of surgery.    Additional instructions about eating and drinking before surgery:  Do not eat any solid foods after midnight. Milk, nutritional drinks/supplements, and infant formula are considered solid foods.  You may drink up to 12 oz. of clear liquids up to 2 hours before your arrival time for surgery, unless directed otherwise by your surgeon. Clear liquids include water, non-carbonated sports drinks (Gatorade), black tea or coffee (no creamers) and breast milk.    If you received a blue folder, please review additional information provided inside the folder regarding additional preparation.     If you have any questions or concerns, please call Pre-Admission Testing at [phone].        Preoperative Bathing instructions    Follow these instructions the evening before and morning of surgery:  Do not shave the day before or day of surgery.  Remove all jewelry until after surgery. Take  off rings and take out all body-piercing jewelry.  Use a clean wash cloth and towel.  Wash your face and hair with your normal soap and shampoo before you use the CHG soap.  Use a wash cloth to clean your skin with the CHG soap. Use enough CHG soap to cover the skin on your whole body. Use the same amount as you would with your normal soap.  Do not use the CHG soap on your face, eyes, ears, or head.  Do not scrub your skin too hard.  Be sure to clean the area well where surgery will be done.  Do not wash with your normal soap after the CHG soap.  Keep away from the water stream when you put CHG soap on. This keeps it from rinsing off too soon.  Rinse your body well.  Pat yourself dry with a clean, soft towel.  Put on clean clothing.  Do not put on any deodorants, lotions, or oils after showering. These might block how the CHG soap works.

## 2024-02-21 LAB
ATRIAL RATE: 62 BPM
P AXIS: 55 DEGREES
P OFFSET: 196 MS
P ONSET: 143 MS
PR INTERVAL: 170 MS
Q ONSET: 228 MS
QRS COUNT: 11 BEATS
QRS DURATION: 84 MS
QT INTERVAL: 418 MS
QTC CALCULATION(BAZETT): 424 MS
QTC FREDERICIA: 422 MS
R AXIS: 9 DEGREES
T AXIS: 56 DEGREES
T OFFSET: 437 MS
VENTRICULAR RATE: 62 BPM

## 2024-02-23 ENCOUNTER — HOSPITAL ENCOUNTER (OUTPATIENT)
Dept: RADIOLOGY | Facility: HOSPITAL | Age: 69
Discharge: HOME | End: 2024-02-23
Payer: COMMERCIAL

## 2024-02-23 DIAGNOSIS — C50.112 MALIGNANT NEOPLASM OF CENTRAL PORTION OF LEFT BREAST IN FEMALE, ESTROGEN RECEPTOR POSITIVE (MULTI): ICD-10-CM

## 2024-02-23 DIAGNOSIS — Z17.0 MALIGNANT NEOPLASM OF CENTRAL PORTION OF LEFT BREAST IN FEMALE, ESTROGEN RECEPTOR POSITIVE (MULTI): ICD-10-CM

## 2024-02-23 PROCEDURE — 19281 PERQ DEVICE BREAST 1ST IMAG: CPT | Mod: LT

## 2024-02-23 PROCEDURE — 2720000007 HC OR 272 NO HCPCS

## 2024-02-23 PROCEDURE — 2500000005 HC RX 250 GENERAL PHARMACY W/O HCPCS: Performed by: RADIOLOGY

## 2024-02-23 PROCEDURE — A4648 IMPLANTABLE TISSUE MARKER: HCPCS

## 2024-02-23 PROCEDURE — 19281 PERQ DEVICE BREAST 1ST IMAG: CPT | Mod: LEFT SIDE | Performed by: RADIOLOGY

## 2024-02-23 PROCEDURE — 2780000003 HC OR 278 NO HCPCS

## 2024-02-23 PROCEDURE — 77065 DX MAMMO INCL CAD UNI: CPT | Mod: LT

## 2024-02-23 RX ADMIN — Medication 10 ML: at 10:15

## 2024-02-28 ENCOUNTER — TELEPHONE (OUTPATIENT)
Dept: HEMATOLOGY/ONCOLOGY | Facility: HOSPITAL | Age: 69
End: 2024-02-28
Payer: COMMERCIAL

## 2024-02-28 NOTE — TELEPHONE ENCOUNTER
Pt called the medical oncology RN triage line asking about her appts on 3/1 and 3/18. These appts appear to be with Constance Sweeney DO. I transferred her call to that office to assist.

## 2024-03-01 ENCOUNTER — ANESTHESIA EVENT (OUTPATIENT)
Dept: OPERATING ROOM | Facility: HOSPITAL | Age: 69
End: 2024-03-01
Payer: COMMERCIAL

## 2024-03-01 ENCOUNTER — HOSPITAL ENCOUNTER (OUTPATIENT)
Dept: RADIOLOGY | Facility: HOSPITAL | Age: 69
Discharge: HOME | End: 2024-03-01
Payer: COMMERCIAL

## 2024-03-01 ENCOUNTER — HOSPITAL ENCOUNTER (OUTPATIENT)
Facility: HOSPITAL | Age: 69
Setting detail: OUTPATIENT SURGERY
Discharge: HOME | End: 2024-03-01
Attending: SURGERY | Admitting: SURGERY
Payer: COMMERCIAL

## 2024-03-01 ENCOUNTER — ANESTHESIA (OUTPATIENT)
Dept: OPERATING ROOM | Facility: HOSPITAL | Age: 69
End: 2024-03-01
Payer: COMMERCIAL

## 2024-03-01 VITALS
DIASTOLIC BLOOD PRESSURE: 80 MMHG | BODY MASS INDEX: 33.59 KG/M2 | WEIGHT: 214 LBS | SYSTOLIC BLOOD PRESSURE: 155 MMHG | RESPIRATION RATE: 18 BRPM | HEART RATE: 77 BPM | TEMPERATURE: 96.8 F | OXYGEN SATURATION: 97 % | HEIGHT: 67 IN

## 2024-03-01 DIAGNOSIS — C50.112 MALIGNANT NEOPLASM OF CENTRAL PORTION OF LEFT BREAST IN FEMALE, ESTROGEN RECEPTOR POSITIVE (MULTI): ICD-10-CM

## 2024-03-01 DIAGNOSIS — C50.112 MALIGNANT NEOPLASM OF CENTRAL PORTION OF LEFT BREAST IN FEMALE, ESTROGEN RECEPTOR POSITIVE (MULTI): Primary | ICD-10-CM

## 2024-03-01 DIAGNOSIS — Z17.0 MALIGNANT NEOPLASM OF CENTRAL PORTION OF LEFT BREAST IN FEMALE, ESTROGEN RECEPTOR POSITIVE (MULTI): ICD-10-CM

## 2024-03-01 DIAGNOSIS — Z17.0 MALIGNANT NEOPLASM OF CENTRAL PORTION OF LEFT BREAST IN FEMALE, ESTROGEN RECEPTOR POSITIVE (MULTI): Primary | ICD-10-CM

## 2024-03-01 PROCEDURE — 3600000004 HC OR TIME - INITIAL BASE CHARGE - PROCEDURE LEVEL FOUR: Performed by: SURGERY

## 2024-03-01 PROCEDURE — 88341 IMHCHEM/IMCYTCHM EA ADD ANTB: CPT | Performed by: PATHOLOGY

## 2024-03-01 PROCEDURE — 38900 IO MAP OF SENT LYMPH NODE: CPT | Performed by: SURGERY

## 2024-03-01 PROCEDURE — 76098 X-RAY EXAM SURGICAL SPECIMEN: CPT | Mod: LT

## 2024-03-01 PROCEDURE — 38792 RA TRACER ID OF SENTINL NODE: CPT | Performed by: SURGERY

## 2024-03-01 PROCEDURE — 7100000010 HC PHASE TWO TIME - EACH INCREMENTAL 1 MINUTE: Performed by: SURGERY

## 2024-03-01 PROCEDURE — 76098 X-RAY EXAM SURGICAL SPECIMEN: CPT | Mod: LEFT SIDE | Performed by: RADIOLOGY

## 2024-03-01 PROCEDURE — 2720000007 HC OR 272 NO HCPCS: Performed by: SURGERY

## 2024-03-01 PROCEDURE — 88307 TISSUE EXAM BY PATHOLOGIST: CPT | Mod: TC,STJLAB | Performed by: SURGERY

## 2024-03-01 PROCEDURE — 7100000002 HC RECOVERY ROOM TIME - EACH INCREMENTAL 1 MINUTE: Performed by: SURGERY

## 2024-03-01 PROCEDURE — A38525 PR BX/REMV,LYMPH NODE,DEEP AXILL: Performed by: ANESTHESIOLOGY

## 2024-03-01 PROCEDURE — 2500000004 HC RX 250 GENERAL PHARMACY W/ HCPCS (ALT 636 FOR OP/ED): Mod: JW | Performed by: SURGERY

## 2024-03-01 PROCEDURE — 2500000004 HC RX 250 GENERAL PHARMACY W/ HCPCS (ALT 636 FOR OP/ED): Performed by: NURSE ANESTHETIST, CERTIFIED REGISTERED

## 2024-03-01 PROCEDURE — 3700000001 HC GENERAL ANESTHESIA TIME - INITIAL BASE CHARGE: Performed by: SURGERY

## 2024-03-01 PROCEDURE — A38525 PR BX/REMV,LYMPH NODE,DEEP AXILL: Performed by: NURSE ANESTHETIST, CERTIFIED REGISTERED

## 2024-03-01 PROCEDURE — 3600000009 HC OR TIME - EACH INCREMENTAL 1 MINUTE - PROCEDURE LEVEL FOUR: Performed by: SURGERY

## 2024-03-01 PROCEDURE — 88342 IMHCHEM/IMCYTCHM 1ST ANTB: CPT | Performed by: PATHOLOGY

## 2024-03-01 PROCEDURE — 2500000001 HC RX 250 WO HCPCS SELF ADMINISTERED DRUGS (ALT 637 FOR MEDICARE OP): Performed by: ANESTHESIOLOGY

## 2024-03-01 PROCEDURE — 3430000001 HC RX 343 DIAGNOSTIC RADIOPHARMACEUTICALS: Performed by: SURGERY

## 2024-03-01 PROCEDURE — A9520 TC99 TILMANOCEPT DIAG 0.5MCI: HCPCS | Performed by: SURGERY

## 2024-03-01 PROCEDURE — 88307 TISSUE EXAM BY PATHOLOGIST: CPT | Performed by: PATHOLOGY

## 2024-03-01 PROCEDURE — 2500000005 HC RX 250 GENERAL PHARMACY W/O HCPCS: Performed by: NURSE ANESTHETIST, CERTIFIED REGISTERED

## 2024-03-01 PROCEDURE — 38525 BIOPSY/REMOVAL LYMPH NODES: CPT | Performed by: SURGERY

## 2024-03-01 PROCEDURE — 7100000009 HC PHASE TWO TIME - INITIAL BASE CHARGE: Performed by: SURGERY

## 2024-03-01 PROCEDURE — 3700000002 HC GENERAL ANESTHESIA TIME - EACH INCREMENTAL 1 MINUTE: Performed by: SURGERY

## 2024-03-01 PROCEDURE — 88332 PATH CONSLTJ SURG EA ADD BLK: CPT | Performed by: PATHOLOGY

## 2024-03-01 PROCEDURE — 2500000004 HC RX 250 GENERAL PHARMACY W/ HCPCS (ALT 636 FOR OP/ED): Performed by: ANESTHESIOLOGY

## 2024-03-01 PROCEDURE — 19301 PARTIAL MASTECTOMY: CPT | Performed by: SURGERY

## 2024-03-01 PROCEDURE — 7100000001 HC RECOVERY ROOM TIME - INITIAL BASE CHARGE: Performed by: SURGERY

## 2024-03-01 PROCEDURE — 2500000005 HC RX 250 GENERAL PHARMACY W/O HCPCS: Performed by: SURGERY

## 2024-03-01 PROCEDURE — 38792 RA TRACER ID OF SENTINL NODE: CPT

## 2024-03-01 PROCEDURE — 88331 PATH CONSLTJ SURG 1 BLK 1SPC: CPT | Mod: TC,SUR,STJLAB | Performed by: PATHOLOGY

## 2024-03-01 RX ORDER — LABETALOL HYDROCHLORIDE 5 MG/ML
5 INJECTION, SOLUTION INTRAVENOUS
Status: DISCONTINUED | OUTPATIENT
Start: 2024-03-01 | End: 2024-03-01 | Stop reason: HOSPADM

## 2024-03-01 RX ORDER — HYDRALAZINE HYDROCHLORIDE 20 MG/ML
5 INJECTION INTRAMUSCULAR; INTRAVENOUS EVERY 30 MIN PRN
Status: DISCONTINUED | OUTPATIENT
Start: 2024-03-01 | End: 2024-03-01 | Stop reason: HOSPADM

## 2024-03-01 RX ORDER — ACETAMINOPHEN 325 MG/1
975 TABLET ORAL ONCE
Status: DISCONTINUED | OUTPATIENT
Start: 2024-03-01 | End: 2024-03-01 | Stop reason: HOSPADM

## 2024-03-01 RX ORDER — DIPHENHYDRAMINE HYDROCHLORIDE 50 MG/ML
12.5 INJECTION INTRAMUSCULAR; INTRAVENOUS ONCE AS NEEDED
Status: DISCONTINUED | OUTPATIENT
Start: 2024-03-01 | End: 2024-03-01 | Stop reason: HOSPADM

## 2024-03-01 RX ORDER — METOCLOPRAMIDE HYDROCHLORIDE 5 MG/ML
10 INJECTION INTRAMUSCULAR; INTRAVENOUS ONCE AS NEEDED
Status: DISCONTINUED | OUTPATIENT
Start: 2024-03-01 | End: 2024-03-01 | Stop reason: HOSPADM

## 2024-03-01 RX ORDER — LIDOCAINE HYDROCHLORIDE 20 MG/ML
INJECTION, SOLUTION EPIDURAL; INFILTRATION; INTRACAUDAL; PERINEURAL AS NEEDED
Status: DISCONTINUED | OUTPATIENT
Start: 2024-03-01 | End: 2024-03-01

## 2024-03-01 RX ORDER — ESZOPICLONE 1 MG/1
1 TABLET, FILM COATED ORAL NIGHTLY
COMMUNITY

## 2024-03-01 RX ORDER — FENTANYL CITRATE 50 UG/ML
25 INJECTION, SOLUTION INTRAMUSCULAR; INTRAVENOUS EVERY 5 MIN PRN
Status: DISCONTINUED | OUTPATIENT
Start: 2024-03-01 | End: 2024-03-01 | Stop reason: HOSPADM

## 2024-03-01 RX ORDER — DEXAMETHASONE SODIUM PHOSPHATE 4 MG/ML
INJECTION, SOLUTION INTRA-ARTICULAR; INTRALESIONAL; INTRAMUSCULAR; INTRAVENOUS; SOFT TISSUE AS NEEDED
Status: DISCONTINUED | OUTPATIENT
Start: 2024-03-01 | End: 2024-03-01

## 2024-03-01 RX ORDER — TRAMADOL HYDROCHLORIDE 50 MG/1
50 TABLET ORAL EVERY 8 HOURS PRN
Qty: 15 TABLET | Refills: 0 | Status: SHIPPED | OUTPATIENT
Start: 2024-03-01 | End: 2024-03-08

## 2024-03-01 RX ORDER — TRAMADOL HYDROCHLORIDE 50 MG/1
50 TABLET ORAL EVERY 8 HOURS PRN
Status: DISCONTINUED | OUTPATIENT
Start: 2024-03-01 | End: 2024-03-01 | Stop reason: HOSPADM

## 2024-03-01 RX ORDER — BUPIVACAINE HYDROCHLORIDE 2.5 MG/ML
INJECTION, SOLUTION INFILTRATION; PERINEURAL AS NEEDED
Status: DISCONTINUED | OUTPATIENT
Start: 2024-03-01 | End: 2024-03-01 | Stop reason: HOSPADM

## 2024-03-01 RX ORDER — ISOSULFAN BLUE 50 MG/5ML
INJECTION, SOLUTION SUBCUTANEOUS AS NEEDED
Status: DISCONTINUED | OUTPATIENT
Start: 2024-03-01 | End: 2024-03-01 | Stop reason: HOSPADM

## 2024-03-01 RX ORDER — MIDAZOLAM HYDROCHLORIDE 1 MG/ML
INJECTION, SOLUTION INTRAMUSCULAR; INTRAVENOUS AS NEEDED
Status: DISCONTINUED | OUTPATIENT
Start: 2024-03-01 | End: 2024-03-01

## 2024-03-01 RX ORDER — KETOROLAC TROMETHAMINE 30 MG/ML
INJECTION, SOLUTION INTRAMUSCULAR; INTRAVENOUS AS NEEDED
Status: DISCONTINUED | OUTPATIENT
Start: 2024-03-01 | End: 2024-03-01

## 2024-03-01 RX ORDER — HYDROCODONE BITARTRATE AND ACETAMINOPHEN 5; 325 MG/1; MG/1
1 TABLET ORAL EVERY 6 HOURS PRN
Qty: 12 TABLET | Refills: 0 | Status: SHIPPED | OUTPATIENT
Start: 2024-03-01 | End: 2024-03-01 | Stop reason: HOSPADM

## 2024-03-01 RX ORDER — FENTANYL CITRATE 50 UG/ML
INJECTION, SOLUTION INTRAMUSCULAR; INTRAVENOUS AS NEEDED
Status: DISCONTINUED | OUTPATIENT
Start: 2024-03-01 | End: 2024-03-01

## 2024-03-01 RX ORDER — ALBUTEROL SULFATE 0.83 MG/ML
2.5 SOLUTION RESPIRATORY (INHALATION)
Status: DISCONTINUED | OUTPATIENT
Start: 2024-03-01 | End: 2024-03-01 | Stop reason: HOSPADM

## 2024-03-01 RX ORDER — CEFAZOLIN SODIUM 2 G/100ML
2 INJECTION, SOLUTION INTRAVENOUS ONCE
Status: DISCONTINUED | OUTPATIENT
Start: 2024-03-01 | End: 2024-03-01 | Stop reason: HOSPADM

## 2024-03-01 RX ORDER — SODIUM CHLORIDE, SODIUM LACTATE, POTASSIUM CHLORIDE, CALCIUM CHLORIDE 600; 310; 30; 20 MG/100ML; MG/100ML; MG/100ML; MG/100ML
INJECTION, SOLUTION INTRAVENOUS CONTINUOUS PRN
Status: DISCONTINUED | OUTPATIENT
Start: 2024-03-01 | End: 2024-03-01

## 2024-03-01 RX ORDER — HYDROCODONE BITARTRATE AND ACETAMINOPHEN 5; 325 MG/1; MG/1
1 TABLET ORAL EVERY 4 HOURS PRN
Status: DISCONTINUED | OUTPATIENT
Start: 2024-03-01 | End: 2024-03-01 | Stop reason: HOSPADM

## 2024-03-01 RX ORDER — CEFAZOLIN SODIUM 2 G/100ML
INJECTION, SOLUTION INTRAVENOUS AS NEEDED
Status: DISCONTINUED | OUTPATIENT
Start: 2024-03-01 | End: 2024-03-01

## 2024-03-01 RX ORDER — HYDROMORPHONE HYDROCHLORIDE 1 MG/ML
1 INJECTION, SOLUTION INTRAMUSCULAR; INTRAVENOUS; SUBCUTANEOUS EVERY 5 MIN PRN
Status: DISCONTINUED | OUTPATIENT
Start: 2024-03-01 | End: 2024-03-01 | Stop reason: HOSPADM

## 2024-03-01 RX ORDER — PROPOFOL 10 MG/ML
INJECTION, EMULSION INTRAVENOUS CONTINUOUS PRN
Status: DISCONTINUED | OUTPATIENT
Start: 2024-03-01 | End: 2024-03-01

## 2024-03-01 RX ORDER — PROPOFOL 10 MG/ML
INJECTION, EMULSION INTRAVENOUS AS NEEDED
Status: DISCONTINUED | OUTPATIENT
Start: 2024-03-01 | End: 2024-03-01

## 2024-03-01 RX ORDER — ONDANSETRON HYDROCHLORIDE 2 MG/ML
INJECTION, SOLUTION INTRAVENOUS AS NEEDED
Status: DISCONTINUED | OUTPATIENT
Start: 2024-03-01 | End: 2024-03-01

## 2024-03-01 RX ORDER — SODIUM CHLORIDE, SODIUM LACTATE, POTASSIUM CHLORIDE, CALCIUM CHLORIDE 600; 310; 30; 20 MG/100ML; MG/100ML; MG/100ML; MG/100ML
100 INJECTION, SOLUTION INTRAVENOUS CONTINUOUS
Status: DISCONTINUED | OUTPATIENT
Start: 2024-03-01 | End: 2024-03-01 | Stop reason: HOSPADM

## 2024-03-01 RX ORDER — MIDAZOLAM HYDROCHLORIDE 1 MG/ML
1 INJECTION, SOLUTION INTRAMUSCULAR; INTRAVENOUS ONCE AS NEEDED
Status: DISCONTINUED | OUTPATIENT
Start: 2024-03-01 | End: 2024-03-01 | Stop reason: HOSPADM

## 2024-03-01 RX ADMIN — SODIUM CHLORIDE, POTASSIUM CHLORIDE, SODIUM LACTATE AND CALCIUM CHLORIDE: 600; 310; 30; 20 INJECTION, SOLUTION INTRAVENOUS at 09:55

## 2024-03-01 RX ADMIN — CEFAZOLIN SODIUM 2 G: 2 INJECTION, SOLUTION INTRAVENOUS at 10:10

## 2024-03-01 RX ADMIN — KETOROLAC TROMETHAMINE 30 MG: 30 INJECTION, SOLUTION INTRAMUSCULAR; INTRAVENOUS at 11:04

## 2024-03-01 RX ADMIN — PROPOFOL 150 MG: 10 INJECTION, EMULSION INTRAVENOUS at 10:03

## 2024-03-01 RX ADMIN — FENTANYL CITRATE 100 MCG: 50 INJECTION, SOLUTION INTRAMUSCULAR; INTRAVENOUS at 10:22

## 2024-03-01 RX ADMIN — FENTANYL CITRATE 25 MCG: 50 INJECTION, SOLUTION INTRAMUSCULAR; INTRAVENOUS at 11:53

## 2024-03-01 RX ADMIN — SUGAMMADEX 200 MG: 100 INJECTION, SOLUTION INTRAVENOUS at 11:04

## 2024-03-01 RX ADMIN — ONDANSETRON 4 MG: 2 INJECTION, SOLUTION INTRAMUSCULAR; INTRAVENOUS at 10:25

## 2024-03-01 RX ADMIN — LIDOCAINE HYDROCHLORIDE 100 MG: 20 INJECTION, SOLUTION EPIDURAL; INFILTRATION; INTRACAUDAL; PERINEURAL at 10:00

## 2024-03-01 RX ADMIN — DEXAMETHASONE SODIUM PHOSPHATE 4 MG: 4 INJECTION INTRA-ARTICULAR; INTRALESIONAL; INTRAMUSCULAR; INTRAVENOUS; SOFT TISSUE at 10:25

## 2024-03-01 RX ADMIN — TRAMADOL HYDROCHLORIDE 50 MG: 50 TABLET, COATED ORAL at 13:53

## 2024-03-01 RX ADMIN — MIDAZOLAM 2 MG: 1 INJECTION INTRAMUSCULAR; INTRAVENOUS at 09:55

## 2024-03-01 RX ADMIN — Medication 0.6 MILLICURIE: at 09:00

## 2024-03-01 RX ADMIN — FENTANYL CITRATE 25 MCG: 50 INJECTION, SOLUTION INTRAMUSCULAR; INTRAVENOUS at 12:28

## 2024-03-01 RX ADMIN — PROPOFOL 200 MG: 10 INJECTION, EMULSION INTRAVENOUS at 10:01

## 2024-03-01 RX ADMIN — PROPOFOL 50 MCG/KG/MIN: 10 INJECTION, EMULSION INTRAVENOUS at 10:10

## 2024-03-01 RX ADMIN — SODIUM CHLORIDE, POTASSIUM CHLORIDE, SODIUM LACTATE AND CALCIUM CHLORIDE: 600; 310; 30; 20 INJECTION, SOLUTION INTRAVENOUS at 10:41

## 2024-03-01 ASSESSMENT — PAIN - FUNCTIONAL ASSESSMENT
PAIN_FUNCTIONAL_ASSESSMENT: 0-10

## 2024-03-01 ASSESSMENT — COLUMBIA-SUICIDE SEVERITY RATING SCALE - C-SSRS
6. HAVE YOU EVER DONE ANYTHING, STARTED TO DO ANYTHING, OR PREPARED TO DO ANYTHING TO END YOUR LIFE?: NO
1. IN THE PAST MONTH, HAVE YOU WISHED YOU WERE DEAD OR WISHED YOU COULD GO TO SLEEP AND NOT WAKE UP?: NO
2. HAVE YOU ACTUALLY HAD ANY THOUGHTS OF KILLING YOURSELF?: NO

## 2024-03-01 ASSESSMENT — PAIN SCALES - GENERAL
PAINLEVEL_OUTOF10: 8
PAINLEVEL_OUTOF10: 4
PAINLEVEL_OUTOF10: 0 - NO PAIN
PAINLEVEL_OUTOF10: 8
PAINLEVEL_OUTOF10: 3
PAINLEVEL_OUTOF10: 4
PAINLEVEL_OUTOF10: 4
PAINLEVEL_OUTOF10: 3
PAINLEVEL_OUTOF10: 3

## 2024-03-01 ASSESSMENT — PAIN DESCRIPTION - LOCATION: LOCATION: BREAST

## 2024-03-01 ASSESSMENT — PAIN DESCRIPTION - ORIENTATION: ORIENTATION: LEFT

## 2024-03-01 NOTE — OP NOTE
Date: 3/1/2024  OR Location: [facility] OR    Name: Bianca Cornell, : 1955, Age: 69 y.o., MRN: [id number], Sex: female    Diagnosis  Pre-op Diagnosis     * Malignant neoplasm of central portion of left breast in female, estrogen receptor positive (CMS/HCC) [C50.112, Z17.0] Post-op Diagnosis     * Malignant neoplasm of central portion of left breast in female, estrogen receptor positive (CMS/HCC) [C50.112, Z17.0]     Procedures  LEFT MAG SEED LOCALIZED PARTIAL MASTECTOMY W/ SENTINEL LYMPH NODE BIOPSY  44030 - ID MASTECTOMY PARTIAL    ID BX/EXC LYMPH NODE OPEN DEEP AXILLARY NODE [43436]  ID INJ RADIOACTIVE TRACER FOR ID OF SENTINEL NODE [35773]  Surgeons      * Constance Sweeney - Primary    Resident/Fellow/Other Assistant:  Surgeon(s) and Role:    Procedure Summary  Anesthesia: General  ASA: II  Anesthesia Staff: Anesthesiologist: Camilla Ordonez MD  CRNA: JENARO Brown-CRNA  Estimated Blood Loss: 10mL    Staff:   Circulator: Yasmin Mccarty RN  Scrub Person: Cristal Frost RN    Details of Procedure:    The patient underwent pre-operative magnetic seed localization in the radiology department prior to surgery.  Localization studies were reviewed confirming appropriate localization.  In the pre-op bay the patient's left breast was marked and injected with technetium 99 in a periareolar 4 quadrant fashion. The patient was then transported to the operative suite. A sign-in was performed verifying patient identity, procedure and laterality.  One dose of preoperative antibiotics was given.  After induction of anesthesia, uptake into the corresponding axilla was confirmed using the handheld gamma probe.  3cc of isosulfan blue was injected in the subareolar space and massaged for several minutes. The left breast and axilla were prepped and draped in the usual sterile fashion.   Just prior to incision, a time-out was performed confirming patient identity, procedure and laterality.  Attention was first turned  toward the axilla. The handheld gamma probe was used to identify the area of greatest uptake in the axilla and the area was marked on the skin.  An incision was made in the axilla and carried down through the subcutaneous tissue and clavipectoral fascia to gain entry into the axilla.  The handheld gamma probe was used to identify a hot node which was excised in its entirety.   The axilla was palpated and did not reveal any palpably abnormal or blue lymph nodes.  Frozen section was negative for michael metastasis. The clavipectoral fascia was re-approximated with 2-0 Vicryl.  The dermis was closed with 3-0 Vicryl and the skin was close with a running 4-0 Monocryl. Attention was then turned to the breast. The sentimag probe was used to identify the location of the targeted lesion and marked on the skin.  An elliptical incision was made, and flaps were raised in the direction of the targeted lesion.  The target lesion was then circumferentially dissected using electrocautery.  Circumferential dissection was carried out to include the targeted lesion and localization seed.  The probe was used to again confirm the presence of the localization seed within the specimen. Once the specimen was completed dissected it was oriented with a silk suture- short suture superior, long suture lateral and passed off the field.   The Vector ink kit was used to ink the specimen: red = superior, blue = inferior, yellow = medial, orange = lateral, green = anterior, and black = posterior. A specimen radiograph was performed which confirmed the presence of the lesion and localization seed.  The biopsy clip had significantly migrated at the time of biopsy and was not included. Five additional cavity margins were taken: superior, inferior, medial, lateral, and posterior with black ink denoting the new margin.  The posterior extent of dissection did not include pectoralis fascia. Skin was included in the main specimen as anterior margin. Clips  were placed to denote the area of resection. Next, meticulous hemostasis was achieved.  The breast parenchyma was re-approximated in multiple layers to recreate the breast mound.  The dermis was closed with 3-0 Vicryl suture and the skin was closed with a running 4-0 Monocryl.  Benzoin and steri-strips were used as dressing.  The patient was then awoken from anesthesia and transported to the PACU in satisfactory condition.    [place] Node Biopsy for Breast Cancer - Left  Operation performed with curative intent Yes   Tracer(s) used to identify sentinel nodes in the upfront surgery (non-neoadjuvant) setting Dye and Radioactive tracer   Tracer(s) used to identify sentinel nodes in the neoadjuvant setting N/A   All nodes (colored or non-colored) present at the end of a dye-filled lymphatic channel were removed Yes   All significantly radioactive nodes were removed Yes   All palpably suspicious nodes were removed N/A   Biopsy-proven positive nodes marked with clips prior to chemotherapy were identified and removed N/A       SPECIMENS:    1. Left axillary sentinel nodes  2. Left magseed localized partial mastectomy: short suture superior, long lateral  3. New superior margin: black ink at new margin  4. New inferior margin:  black ink at new margin  5. New medial margin: black ink at new margin  6. New lateral margin: black ink at new margin  7. New posterior margin: black ink at new margin      Constance Sweeney, DO

## 2024-03-01 NOTE — ANESTHESIA PROCEDURE NOTES
Airway  Date/Time: 3/1/2024 10:05 AM  Urgency: elective    Airway not difficult    Staffing  Performed: CRNA   Authorized by: Camilla Ordonez MD    Performed by: JENARO Brown-CRNA  Patient location during procedure: OR    Indications and Patient Condition  Indications for airway management: anesthesia  Spontaneous ventilation: present  Sedation level: deep  Preoxygenated: yes  Patient position: sniffing  Mask difficulty assessment: 1 - vent by mask    Final Airway Details  Final airway type: endotracheal airway      Successful airway: ETT     Successful intubation technique: video laryngoscopy  Facilitating devices/methods: intubating stylet  Blade size: #3  ETT size (mm): 7.0  Cormack-Lehane Classification: grade IIa - partial view of glottis  Placement verified by: capnometry   Measured from: lips  ETT to lips (cm): 21  Number of attempts at approach: 1

## 2024-03-01 NOTE — DISCHARGE INSTRUCTIONS
DR. ARREGUIN'S BREAST SURGERY DISCHARGE INSTRUCTIONS    Wound Care    Do not remove the surgical bra for 24 hours.  Wear the bra to bed to reduce movement.  Your incisions are covered with steri strips.  Leave these in place until they begin to lift from the edges.  If steri strips are still in place after 10 days you may remove the strips.  You can place ice on your breast as needed for pain relief  (20 minutes on / 20 minutes off).    Activity    You may shower after 24 hours, but no baths, tubs, or swimming for 2 weeks.  Do not lift anything more than 10lbs or do any strenuous activity until seen in post op.  You may drive when you are not taking narcotic pain medication.    Medication    Take prescription  narcotic medication for severe pain.  Do not drive while taking narcotics.  You may take acetaminophen (Tylenol), ibuprofen (Motrin), or naproxen (Aleve) for pain.    When to Call    Your wound is red, swollen, or has a lot of bleeding or drainage.  Your pain is not controlled by the medicines.  You have a fever or 100F or greater.    Follow Up    Dr. Arreguin will review your pathology results at your post-op visit.  Please call the office at [phone] to make a follow up appointment.

## 2024-03-01 NOTE — ANESTHESIA POSTPROCEDURE EVALUATION
Patient: Bianca Cornell    Procedure Summary       Date: 03/01/24 Room / Location: [facility] OR  / [facility] OR    Anesthesia Start: 0955 Anesthesia Stop: 1140    Procedure: LEFT MAG SEED LOCALIZED PARTIAL MASTECTOMY W/ SENTINEL LYMPH NODE BIOPSY (Left) Diagnosis:       Malignant neoplasm of central portion of left breast in female, estrogen receptor positive (CMS/HCC)      (Malignant neoplasm of central portion of left breast in female, estrogen receptor positive (CMS/HCC) [C50.112, Z17.0])    Surgeons: Constance Sweeney DO Responsible Provider: Camilla Ordonez MD    Anesthesia Type: general ASA Status: 2            Anesthesia Type: general    Vitals Value Taken Time   /70 03/01/24 1300   Temp 36 °C (96.8 °F) 03/01/24 1300   Pulse 70 03/01/24 1303   Resp 27 03/01/24 1303   SpO2 97 % 03/01/24 1303   Vitals shown include unvalidated device data.    Anesthesia Post Evaluation    Patient location during evaluation: PACU  Patient participation: complete - patient participated  Level of consciousness: sleepy but conscious  Pain management: satisfactory to patient  Airway patency: patent  Cardiovascular status: acceptable  Respiratory status: acceptable  Hydration status: euvolemic  Postoperative Nausea and Vomiting: none        No notable events documented.

## 2024-03-01 NOTE — ANESTHESIA PREPROCEDURE EVALUATION
Patient: Bianca Cornell    Procedure Information       Date/Time: 03/01/24 0905    Procedure: LEFT MAG SEED LOCALIZED PARTIAL MASTECTOMY W/ SENTINEL LYMPH NODE BIOPSY (Left)    Location: [facility] / [facility]    Surgeons: Constance Sweeney, DO            Relevant Problems   No relevant active problems       Clinical information reviewed:   Tobacco  Allergies  Meds   Med Hx  Surg Hx  OB Status  Fam Hx  Soc   Hx        NPO Detail:  NPO/Void Status  Carbohydrate Drink Given Prior to Surgery? : N  Date of Last Liquid: 03/01/24  Time of Last Liquid: 0500  Date of Last Solid: 02/29/24  Time of Last Solid: 2000  Last Intake Type: Clear fluids  Time of Last Void: 0802         Physical Exam    Airway  Mallampati: III  TM distance: >3 FB  Neck ROM: full     Cardiovascular - normal exam     Dental   (+) upper dentures     Pulmonary - normal exam     Abdominal - normal exam           Vitals:    03/01/24 0800   BP: 153/72   Pulse: 74   Resp: 18   Temp: 36.3 °C (97.3 °F)   SpO2: 97%       Past Surgical History:   Procedure Laterality Date    APPENDECTOMY  05/29/2013    Appendectomy    BI MAMMO GUIDED LOCALIZATION BREAST RIGHT Right 01/21/2022    BI MAMMO GUIDED LOCALIZATION BREAST RIGHT 1/21/2022 [facility]    BREAST BIOPSY      ENDOMETRIAL ABLATION      OTHER SURGICAL HISTORY  08/17/2021    Tubal ligation    SHOULDER SURGERY      TUBAL LIGATION       Past Medical History:   Diagnosis Date    Atypical ductal hyperplasia, breast     Breast cancer (CMS/HCC)     left    Breast cancer (CMS/HCC)     Hypertension     Hypogammaglobulinemia (CMS/HCC)     Necrotizing fasciitis (CMS/HCC)     right thigh 2016    Personal history of other diseases of the female genital tract     History of endometriosis    PONV (postoperative nausea and vomiting)     Unspecified benign mammary dysplasia of unspecified breast 11/11/2021    Atypical ductal hyperplasia of breast     No current facility-administered medications for this  encounter.    Current Outpatient Medications:     Bacillus subtilis-inulin 1.5 billion cell-1 gram tablet,chewable, Chew 1 tablet 2 times a day., Disp: , Rfl:     calcium carbonate/vitamin D3 (CALCIUM 600 WITH VITAMIN D3 ORAL), Take 2 tablets by mouth 2 times a day., Disp: , Rfl:     doxycycline (Adoxa) 100 mg tablet, Take 1 tablet (100 mg) by mouth once daily. Take with a full glass of water and do not lie down for at least 30 minutes after, Disp: , Rfl:     hydrALAZINE (Apresoline) 25 mg tablet, Take 1 tablet (25 mg) by mouth 3 times a day., Disp: , Rfl:     multivitamin with minerals tablet, Take 1 tablet by mouth once daily., Disp: , Rfl:     omega 3-dha-epa-fish oil (Fish OiL) 1,000 mg (120 mg-180 mg) capsule, Take 1 capsule (1,000 mg) by mouth 2 times a day., Disp: , Rfl:     sulfamethoxazole-trimethoprim (Bactrim DS) 800-160 mg tablet, Take 1 tablet by mouth once daily., Disp: , Rfl:     eszopiclone (Lunesta) 1 mg tablet, Take 1 tablet (1 mg) by mouth once daily at bedtime. Take immediately before bedtime, Disp: , Rfl:   Prior to Admission medications    Medication Sig Start Date End Date Taking? Authorizing Provider   Bacillus subtilis-inulin 1.5 billion cell-1 gram tablet,chewable Chew 1 tablet 2 times a day. 11/11/21  Yes Historical Provider, MD   calcium carbonate/vitamin D3 (CALCIUM 600 WITH VITAMIN D3 ORAL) Take 2 tablets by mouth 2 times a day.   Yes Historical Provider, MD   doxycycline (Adoxa) 100 mg tablet Take 1 tablet (100 mg) by mouth once daily. Take with a full glass of water and do not lie down for at least 30 minutes after   Yes Historical Provider, MD   hydrALAZINE (Apresoline) 25 mg tablet Take 1 tablet (25 mg) by mouth 3 times a day.   Yes Historical Provider, MD   multivitamin with minerals tablet Take 1 tablet by mouth once daily.   Yes Historical Provider, MD   omega 3-dha-epa-fish oil (Fish OiL) 1,000 mg (120 mg-180 mg) capsule Take 1 capsule (1,000 mg) by mouth 2 times a day.   Yes  "Historical Provider, MD   sulfamethoxazole-trimethoprim (Bactrim DS) 800-160 mg tablet Take 1 tablet by mouth once daily.   Yes Historical Provider, MD   eszopiclone (Lunesta) 1 mg tablet Take 1 tablet (1 mg) by mouth once daily at bedtime. Take immediately before bedtime    Historical Provider, MD   omega-3/dha/epa/fish oil (OMEGA-3 ORAL) Take 300 mg by mouth once daily.  3/1/24  Historical Provider, MD     Allergies   Allergen Reactions    Clindamycin Nausea/vomiting     Oral - caused pancreatitis per pt    Dexilant [Dexlansoprazole] Nausea/vomiting     caused pancreatitis per pt    Flagyl [Metronidazole] Nausea/vomiting     caused pancreatitis per pt    Lisinopril Nausea/vomiting     caused pancreatitis per pt    Morphine Rash and Nausea/vomiting     Social History     Tobacco Use    Smoking status: Former     Years: 20     Types: Cigarettes     Quit date: 3/1/2019     Years since quittin.0    Smokeless tobacco: Never   Substance Use Topics    Alcohol use: Never     Comment: 0         Chemistry    Lab Results   Component Value Date/Time     2024 1519    K 3.8 2024 1519     2024 1519    CO2 25 2024 1519    BUN 14 2024 1519    CREATININE 0.74 2024 1519    Lab Results   Component Value Date/Time    CALCIUM 10.2 2024 1519    ALKPHOS 71 2024 0952    AST 15 2024 0952    ALT 18 2024 0952    BILITOT 0.4 2024 0952          Lab Results   Component Value Date/Time    WBC 11.7 (H) 2024 1519    HGB 14.4 2024 1519    HCT 42.7 2024 1519     2024 1519     No results found for: \"PROTIME\", \"PTT\", \"INR\"  Encounter Date: 24   ECG 12 lead (Ancillary Performed)   Result Value    Ventricular Rate 62    Atrial Rate 62    NE Interval 170    QRS Duration 84    QT Interval 418    QTC Calculation(Bazett) 424    P Axis 55    R Axis 9    T Axis 56    QRS Count 11    Q Onset 228    P Onset 143    P Offset 196    T Offset 437 "    QTC Fredericia 422    Narrative    Normal sinus rhythm  Normal ECG  When compared with ECG of 30-MAR-2023 12:47,  No significant change was found  Confirmed by Rickie Castaneda ([id number]) on 2/21/2024 9:48:54 AM      Anesthesia Plan    History of general anesthesia?: yes  History of complications of general anesthesia?: no    ASA 2     general   (TIVA planned)  Anesthetic plan and risks discussed with patient.    Plan discussed with CRNA.

## 2024-03-07 LAB
LAB AP ASR DISCLAIMER: NORMAL
LABORATORY COMMENT REPORT: NORMAL
Lab: NORMAL
PATH REPORT.FINAL DX SPEC: NORMAL
PATH REPORT.GROSS SPEC: NORMAL
PATH REPORT.RELEVANT HX SPEC: NORMAL
PATH REPORT.TOTAL CANCER: NORMAL
PATHOLOGY SYNOPTIC REPORT: NORMAL

## 2024-03-08 PROBLEM — G40.909 EPILEPSY (MULTI): Status: ACTIVE | Noted: 2023-03-30

## 2024-03-08 PROBLEM — D22.5 MELANOCYTIC NEVI OF TRUNK: Status: ACTIVE | Noted: 2019-10-14

## 2024-03-08 PROBLEM — D23.9 OTHER BENIGN NEOPLASM OF SKIN, UNSPECIFIED: Status: ACTIVE | Noted: 2022-07-18

## 2024-03-08 PROBLEM — S76.311A TEAR OF RIGHT HAMSTRING: Status: ACTIVE | Noted: 2024-03-08

## 2024-03-08 PROBLEM — L82.1 OTHER SEBORRHEIC KERATOSIS: Status: ACTIVE | Noted: 2020-01-13

## 2024-03-08 PROBLEM — I25.10 ATHEROSCLEROTIC HEART DISEASE OF NATIVE CORONARY ARTERY WITHOUT ANGINA PECTORIS: Status: ACTIVE | Noted: 2023-03-30

## 2024-03-08 PROBLEM — F41.9 ANXIETY DISORDER: Status: ACTIVE | Noted: 2023-04-03

## 2024-03-08 PROBLEM — M75.102 TEAR OF LEFT ROTATOR CUFF: Status: ACTIVE | Noted: 2023-04-03

## 2024-03-08 PROBLEM — R42 VERTIGO: Status: ACTIVE | Noted: 2024-03-08

## 2024-03-08 PROBLEM — L02.91 ABSCESS: Status: ACTIVE | Noted: 2024-03-08

## 2024-03-08 PROBLEM — L02.31 ABSCESS, GLUTEAL, RIGHT: Status: ACTIVE | Noted: 2024-03-08

## 2024-03-08 PROBLEM — L02.215 PERINEAL ABSCESS: Status: ACTIVE | Noted: 2024-03-08

## 2024-03-08 PROBLEM — L81.4 OTHER MELANIN HYPERPIGMENTATION: Status: ACTIVE | Noted: 2022-07-18

## 2024-03-08 PROBLEM — M25.512 PAIN OF LEFT SHOULDER REGION: Status: ACTIVE | Noted: 2023-02-01

## 2024-03-08 PROBLEM — I10 HYPERTENSION: Status: ACTIVE | Noted: 2024-03-08

## 2024-03-08 PROBLEM — L73.2 HIDRADENITIS SUPPURATIVA: Status: ACTIVE | Noted: 2020-02-10

## 2024-03-08 PROBLEM — E66.9 OBESITY: Status: ACTIVE | Noted: 2023-03-30

## 2024-03-08 PROBLEM — M94.212 CHONDROMALACIA OF LEFT SHOULDER: Status: ACTIVE | Noted: 2023-04-03

## 2024-03-08 PROBLEM — R10.13 ABDOMINAL PAIN, EPIGASTRIC: Status: ACTIVE | Noted: 2024-03-08

## 2024-03-08 PROBLEM — L73.9 FOLLICULAR DISORDER, UNSPECIFIED: Status: ACTIVE | Noted: 2019-08-12

## 2024-03-08 PROBLEM — J45.909 UNCOMPLICATED ASTHMA (HHS-HCC): Status: ACTIVE | Noted: 2023-03-30

## 2024-03-08 PROBLEM — K21.9 GASTROESOPHAGEAL REFLUX DISEASE WITHOUT ESOPHAGITIS: Status: ACTIVE | Noted: 2023-04-03

## 2024-03-08 PROBLEM — R92.30 DENSE BREASTS, UNSPECIFIED: Status: ACTIVE | Noted: 2024-01-11

## 2024-03-08 PROBLEM — M19.90 OSTEOARTHRITIS: Status: ACTIVE | Noted: 2023-03-30

## 2024-03-08 PROBLEM — T14.8XXA CONTUSION: Status: ACTIVE | Noted: 2024-03-08

## 2024-03-08 PROBLEM — F19.21 HISTORY OF SUBSTANCE DEPENDENCE (MULTI): Status: ACTIVE | Noted: 2023-04-03

## 2024-03-08 PROBLEM — R92.1 BREAST CALCIFICATIONS: Status: ACTIVE | Noted: 2024-03-08

## 2024-03-08 PROBLEM — R11.10 VOMITING: Status: ACTIVE | Noted: 2024-03-08

## 2024-03-08 PROBLEM — T75.3XXA MOTION SICKNESS: Status: ACTIVE | Noted: 2024-03-08

## 2024-03-08 PROBLEM — Z85.828 PERSONAL HISTORY OF OTHER MALIGNANT NEOPLASM OF SKIN: Status: ACTIVE | Noted: 2019-10-14

## 2024-03-15 NOTE — PROGRESS NOTES
BREAST SURGERY POST OPERATIVE VISIT    Assessment/Plan     Left breast IDC g2 ER 95 [place] 95% HER2- at 9:00 5cmFN measuring 0.4cm on final pathology  -[id number] stage IA    We reviewed the pathology results from surgery.  The cancer has been excised to negative margins with negative lymph nodes.  Her surgical treatment is complete.    I will refer to med/onc and rad/onc for recommendations for adjuvant treatment.    I will follow up at the time of next mammogram for imaging and clinical exam or sooner for any breast concerns.    Subjective   Bianca Cornell is a 69 y.o. female here for post op visit s/p left MS PM SLNB.    Date of surgery: 3/1/2024  Pathology   Tumor size: 0.4cm   Lymph nodes: 0/2   Margins: negative, focally close lateral margin   Staging: [id number]     Objective   Physical Exam  General: Alert and oriented x 3.  Mood and affect are appropriate.  HEENT: EOMI, PERRLA.   Neck: supple, no masses, no cervical adenopathy.  Cardiovascular: no lower extremity edema.  Pulmonary: breathing non labored on room air.  GI: Abdomen soft, no masses. No hepatomegaly or splenomegaly.  Lymph nodes: No supraclavicular or axillary adenopathy bilaterally. Left axillary incision healing well.  Musculoskeletal: Full range of motion in the upper extremities bilaterally.  Neuro: denies dizziness, tremors    Breasts: The breasts were examined in both the seated and supine positions.    RIGHT: The nipple is everted without nipple discharge.  There are no skin changes, skin thickening, or dimpling. There are no masses palpated in the RIGHT breast.   LEFT: The nipple is everted without nipple discharge.  There are no skin changes, skin thickening, or dimpling. There are no masses palpated in the LEFT breast. Radial incision healing well.    Radiology review: All images and reports were personally reviewed.         Constance Sweeney DO

## 2024-03-18 ENCOUNTER — OFFICE VISIT (OUTPATIENT)
Dept: SURGICAL ONCOLOGY | Facility: HOSPITAL | Age: 69
End: 2024-03-18
Payer: COMMERCIAL

## 2024-03-18 VITALS — BODY MASS INDEX: 33.59 KG/M2 | WEIGHT: 214 LBS | HEIGHT: 67 IN

## 2024-03-18 DIAGNOSIS — Z17.0 MALIGNANT NEOPLASM OF CENTRAL PORTION OF LEFT BREAST IN FEMALE, ESTROGEN RECEPTOR POSITIVE (MULTI): Primary | ICD-10-CM

## 2024-03-18 DIAGNOSIS — Z17.0 MALIGNANT NEOPLASM OF CENTRAL PORTION OF LEFT BREAST IN FEMALE, ESTROGEN RECEPTOR POSITIVE (MULTI): ICD-10-CM

## 2024-03-18 DIAGNOSIS — C50.112 MALIGNANT NEOPLASM OF CENTRAL PORTION OF LEFT BREAST IN FEMALE, ESTROGEN RECEPTOR POSITIVE (MULTI): Primary | ICD-10-CM

## 2024-03-18 DIAGNOSIS — C50.112 MALIGNANT NEOPLASM OF CENTRAL PORTION OF LEFT BREAST IN FEMALE, ESTROGEN RECEPTOR POSITIVE (MULTI): ICD-10-CM

## 2024-03-18 PROCEDURE — 1160F RVW MEDS BY RX/DR IN RCRD: CPT | Performed by: SURGERY

## 2024-03-18 PROCEDURE — 99024 POSTOP FOLLOW-UP VISIT: CPT | Performed by: SURGERY

## 2024-03-18 PROCEDURE — 1036F TOBACCO NON-USER: CPT | Performed by: SURGERY

## 2024-03-18 PROCEDURE — 1159F MED LIST DOCD IN RCRD: CPT | Performed by: SURGERY

## 2024-03-18 PROCEDURE — 3008F BODY MASS INDEX DOCD: CPT | Performed by: SURGERY

## 2024-03-21 ENCOUNTER — TUMOR BOARD CONFERENCE (OUTPATIENT)
Dept: HEMATOLOGY/ONCOLOGY | Facility: HOSPITAL | Age: 69
End: 2024-03-21
Payer: COMMERCIAL

## 2024-03-21 NOTE — TUMOR BOARD NOTE
MULTIDISCIPLINARY BREAST CANCER TUMOR BOARD CONFERENCE NOTE  Bianca Cornell was presented at Breast Cancer Tumor Board Conference  Conference date: 3/21/2024  Presenting Provider(s): Dr. Constance Sweeney  Present at Conference: Medical Oncology, Radiation Oncology, Surgical Oncology, Radiology, and Pathology Representatives  Conference Review Type: Pathology Review    National Guidelines discussed: Yes    Surgical Resection: Surgery is complete.  Radiation therapy: Consider    Genomic Testing: no    Systemic therapy: Recommend endocrine therapy  Clinical Trial Eligible: No  Genetics:  not indicated    Referral Recommendations:  Medical Oncology and radiation oncology    Cancer Staging:  Cancer Staging   No matching staging information was found for the patient.     Disclaimer  SCC tumor board recommendations represent the consensus opinion of physicians present at a weekly patient care conference. The treating [facility] physician is not always present, and many of the physicians formulating the recommendation have not personally seen or examined the patient under discussion. It is understood that the treating [facility] physician considers the expertise of the Tumor Board Recommendation in formulating his/her plan for the patient. However, in many situations, based on individualized patient considerations, a different plan is determined by the treating physician to be the optimal medical management.    Scribe Attestation  By signing my name below, Ida MONTEJO Scribe   attest that this documentation has been prepared under the direction and in the presence of BREAST TUMOR BOARD.

## 2024-03-25 NOTE — PROGRESS NOTES
Staff Physician: Edmundo Moyer MD, PhD  Referring Physician: Constance Sweeney DO  Date of Service: 3/26/2024    RADIATION ONCOLOGY CONSULT NOTE    IDENTIFYING DATA:   Cancer Staging   Malignant neoplasm of central portion of left breast in female, estrogen receptor positive (CMS/HCC)  Staging form: Breast, AJCC 8th Edition  - Pathologic: Stage IA (pT1a, pN0(sn), cM0, G2, ER+, MO+, HER2-) - Signed by Constance Sweeney DO on 3/22/2024    Problem List Items Addressed This Visit       Atypical ductal hyperplasia of breast    Malignant neoplasm of central portion of left breast in female, estrogen receptor positive (CMS/HCC) - Primary    Relevant Orders    Referral to Radiation Oncology    Rad Onc Intent to Treat    Gastroesophageal reflux disease without esophagitis    Body mass index (BMI) 32.0-32.9, adult    Obesity       HISTORY OF PRESENT ILLNESS:    Ms. Bianca Cornell is a 69 y.o.-year-old with Left breast IDC G2 ER 95 MO 95% HER2- at 9:00 5cm FN measuring 0.4cm on final pathology. [id number] stage IA s/p lumpectomy with no residual disease seen, negative margins, and 0/2 LN involved.    Her brief oncologic history is as follows:  January 21, 2022: Patient underwent an excisional biopsy with Dr. Zachery Kendrick for right breast atypical ductal hyperplasia.  She was subsequently followed in high risk clinic.    January 11, 2024: Left diagnostic mammogram with ultrasound showed BI-RADS Category 4 suspicious left breast mass measuring 0.5 cm at the 9 o'clock position 5 cm from the nipple without axillary lymphadenopathy.  Biopsy was recommended.    January 12, 2024: Biopsy of left breast mass with stereotactic guidance showed invasive ductal carcinoma, grade 2, ER 95% positive, MO 95% positive, HER2/caryn negative.    February 1, 2024: She was seen by Dr. Constance Sweeney to discuss surgical options for her clinical T1 N0 M0 stage Ia breast cancer that measured 5 mm on mammogram.  Patient elected to proceed with  lumpectomy.    March 1, 2024: Patient underwent lumpectomy with sentinel lymph node biopsy.  Final pathology showed no residual carcinoma with 0 out of 2 lymph nodes involved.  Final pathology was a pT1a N0 M0 4 mm ER/AL positive HER2/caryn negative grade 2 invasive ductal carcinoma, 0 out of 2 lymph nodes involved.    She presents today to discuss the role of adjuvant radiation therapy.  She reports she is healing well since her surgery with no significant complaints.  She denies fever, chills, night sweats, vision changes, shortness of breath, bone pain.  She is healing well from her surgery and is otherwise without complaint at this time.    PAST MEDICAL HISTORY:  Past Medical History:   Diagnosis Date    Atypical ductal hyperplasia, breast     Breast cancer (CMS/HCC)     left    Breast cancer (CMS/HCC)     Hypertension     Hypogammaglobulinemia (CMS/HCC)     Necrotizing fasciitis (CMS/HCC)     right thigh 2016    Personal history of other diseases of the female genital tract     History of endometriosis    PONV (postoperative nausea and vomiting)     Unspecified benign mammary dysplasia of unspecified breast 11/11/2021    Atypical ductal hyperplasia of breast     PAST SURGICAL HISTORY:  Past Surgical History:   Procedure Laterality Date    APPENDECTOMY  05/29/2013    Appendectomy    BI MAMMO GUIDED LOCALIZATION BREAST RIGHT Right 01/21/2022    BI MAMMO GUIDED LOCALIZATION BREAST RIGHT 1/21/2022 [facility]    BREAST BIOPSY      ENDOMETRIAL ABLATION      OTHER SURGICAL HISTORY  08/17/2021    Tubal ligation    SHOULDER SURGERY      TUBAL LIGATION       PAST GYNECOLOGIC HISTORY: Menarche at age 15, G2, P2, age of live first birth was 18.  She did not breast-feed, no oral contraceptive therapy, she had menopause at age 51, she is has no history of hormone replacement therapy.    ALLERGIES:  Allergies   Allergen Reactions    Clindamycin Nausea/vomiting     Oral - caused pancreatitis per pt    Dexilant  [Dexlansoprazole] Nausea/vomiting     caused pancreatitis per pt    Flagyl [Metronidazole] Nausea/vomiting     caused pancreatitis per pt    Lisinopril Nausea/vomiting     caused pancreatitis per pt    Morphine Rash and Nausea/vomiting     MEDICATIONS:    Current Outpatient Medications:     Bacillus subtilis-inulin 1.5 billion cell-1 gram tablet,chewable, Chew 1 tablet 2 times a day., Disp: , Rfl:     calcium carbonate/vitamin D3 (CALCIUM 600 WITH VITAMIN D3 ORAL), Take 2 tablets by mouth 2 times a day., Disp: , Rfl:     doxycycline (Adoxa) 100 mg tablet, Take 1 tablet (100 mg) by mouth once daily. Take with a full glass of water and do not lie down for at least 30 minutes after, Disp: , Rfl:     eszopiclone (Lunesta) 1 mg tablet, Take 1 tablet (1 mg) by mouth once daily at bedtime. Take immediately before bedtime, Disp: , Rfl:     hydrALAZINE (Apresoline) 25 mg tablet, Take 1 tablet (25 mg) by mouth 3 times a day., Disp: , Rfl:     multivitamin with minerals tablet, Take 1 tablet by mouth once daily., Disp: , Rfl:     omega 3-dha-epa-fish oil (Fish OiL) 1,000 mg (120 mg-180 mg) capsule, Take 1 capsule (1,000 mg) by mouth 2 times a day., Disp: , Rfl:     sulfamethoxazole-trimethoprim (Bactrim DS) 800-160 mg tablet, Take 1 tablet by mouth once daily., Disp: , Rfl:    SOCIAL HISTORY:  Social History     Tobacco Use    Smoking status: Former     Years: 20     Types: Cigarettes     Quit date: 3/1/2019     Years since quittin.0     Passive exposure: Never    Smokeless tobacco: Never   Substance Use Topics    Alcohol use: Never     Comment: 0     FAMILY HISTORY:  Family History   Problem Relation Name Age of Onset    Diabetes Mother      Hypertension Father      Heart disease Father      Hypertension Brother      Heart attack Brother         REVIEW OF SYSTEMS:  Except for the symptoms described above, the review of systems is negative. Specifically, except as noted in the HPI, when asked the patient expressed no  complaints relative to constitutional (fever, weight loss), eyes, ears, nose, mouth, throat, neurologic, cardiovascular, pulmonary, breast, GI, , skin, musculoskeletal, endocrine, hematologic/lymphatic, or immunologic systems.    RADIATION SCREENING QUESTIONS:  Prior radiation therapy: No  Pacemaker: No  Other implantable devices: No  Connective tissue disease: No    PERFORMANCE STATUS:  Karnofsky Performance Score/ECO, Fully active, able to carry on all pre-disease performed without restriction (ECOG equivalent 0)    PHYSICAL EXAMINATION:  BP (!) 161/111 (BP Location: Right arm, Patient Position: Sitting, BP Cuff Size: Large adult) Comment: notified nurse  Pulse 74   Temp 36.7 °C (98.1 °F) (Temporal)   Resp 18   Wt 101 kg (222 lb 3.6 oz)   LMP  (LMP Unknown)   SpO2 96%   BMI 34.81 kg/m²   Pain score: 0/10  General: no acute distress, engaged in conversation.   HEENT: Normocephalic, atraumatic. Extraocular movements are intact.   Neck: supple with trachea at midline, no palpable adenopathy.   Pulmonary: Breathing comfortably on room air, no respiratory distress  Cardiovascular: Regular rate, no cyanosis, well-perfused  Abdomen: Soft, nontender, nondistended.   Extremities: No lower extremity edema or cyanosis. Normal range of motion.  Skin: Without rash or obvious lesions.   Musculoskeletal: Normal range of motion. Able to raise both arms above head without issues  Neurologic: Alert and oriented x3. Cranial nerves grossly intact.   Breast: Examination of the breast reveals a well-healed incision in the left breast with associated ecchymosis.  No seroma, masses, or adenopathy appreciated.    LABORATORY AND IMAGING DATA:  Imaging: All imaging was personally reviewed and interpreted in clinic. Findings as per HPI and EMR.    Laboratory/Pathology:  All pertinent labs and pathology were personally reviewed and interpreted in clinic. Findings as per HPI and EMR.  INVASIVE CARCINOMA OF THE BREAST: RESECTION  - A, F  SPECIMEN   Procedure  Excision (less than total mastectomy)   Specimen Laterality  Left   TUMOR   Tumor Site  Not specified   Histologic Type  Invasive carcinoma of no special type (ductal)   Histologic Grade ([place] Histologic Score)     Glandular (Acinar) / Tubular Differentiation  Score 3   Nuclear Pleomorphism  Score 2   Mitotic Rate  Score 1   Overall Grade  Grade 2 (scores of 6 or 7)   Tumor Size  Greatest dimension of largest invasive focus (Millimeters): 4 mm   Tumor Focality  Single focus of invasive carcinoma   Ductal Carcinoma In Situ (DCIS)  Not identified   Lobular Carcinoma In Situ (LCIS)  Not identified   Lymphatic and / or Vascular Invasion  Not identified   Dermal Lymphatic and / or Vascular Invasion  Not identified   Microcalcifications  Present in non-neoplastic tissue     Atypical ductal hyperplasia     Treatment Effect in the Breast  No known presurgical therapy   MARGINS   Margin Status for Invasive Carcinoma  All margins negative for invasive carcinoma   Distance from Invasive Carcinoma to Closest Margin  Less than: Invasive carcinoma is < 1mm from the inked lateral margin (Part F)  mm   REGIONAL LYMPH NODES   Regional Lymph Node Status  All regional lymph nodes negative for tumor   Total Number of Lymph Nodes Examined (sentinel and non-sentinel)  2   Number of [place] Nodes Examined  2   pTNM CLASSIFICATION (AJCC 8th Edition)   Reporting of pT, pN, and (when applicable) pM categories is based on information available to the pathologist at the time the report is issued. As per the AJCC (Chapter 1, 8th Ed.) it is the managing physician’s responsibility to establish the final pathologic stage based upon all pertinent information, including but potentially not limited to this pathology report.   pT Category  pT1a   pN Category  pN0   N Suffix  (sn)   SPECIAL STUDIES        Estrogen Receptor (ER) Status  Positive (greater than 10% of cells demonstrate nuclear positivity)   Percentage  of Cells with Nuclear Positivity  >95 %        Progesterone Receptor (PgR) Status  Positive   Percentage of Cells with Nuclear Positivity  >95 %        HER2 (by immunohistochemistry)  Negative (Score 0)     IMPRESSION:  Ms. Cornell is a 69-year-old woman with stage IA [id number] 4 mm grade 2 invasive ductal carcinoma, ER 95% positive, MI 95% positive, HER2/caryn negative status post lumpectomy with negative surgical margins (close but negative) and 0 out of 2 lymph nodes involved here to discuss the role of adjuvant radiation therapy.    The patient is doing well > 3 weeks out from her lumpectomy and SLNB, with well-healing incision, no significant seroma or lumpectomy retraction, and no other suspicious findings in the breast.     We discussed with Ms. Cornell that lumpectomy followed by whole breast radiation is the standard of care for breast conservation candidates. Post-lumpectomy radiation reduces the risk of locoregional recurrences by 2/3, reduces the risk of overall recurrence by 1/2, and afford a modest survival benefit according to the EBCTCG meta-analysis. Even among ER+ patients treated with tamoxifen, WBRT still decreases the risk of ipsilateral breast tumor (from ~ 16.5% with tamoxifen alone to 9% with radiation alone to 2.8% with tamoxifen + radiation according to NSABP B-21).     In some patients, the absolute risk of a local recurrence (within their lifetime) is low enough to warrant omission of radiation.  This would be an option in this Aneta's case as she did meet the eligibility criteria for PRIME II (age >= 65, tumor < 3 cm, grade 1-2, ER+, pN0). She would not have met criteria for CALGB 9343 (age >= 70, tumor < = 2 cm, cN0, ER+), we reviewed the data from the prime 2 and the RUPA GB trials that suggest that the rates of recurrence in women without radiation therapy is approximately 10% at 10 years and this is reduced to less than 1% at 10 years with the addition of adjuvant radiation  therapy.     Ms. Cornell is an excellent candidate for hypofractionated radiation, which decreases acute toxicity and provides equivalent oncologic and functional/cosmetic outcomes compared to conventionally fractionated radiation (long-term outcome data from [place] and [place] trials, recent toxicity data from [facility] and [facility]). These benefits apply to patients regardless of tumor grade, hormone receptor status, HER2 receptor status, margin status, tumor side, or breast size (as long as dose-homogeneity goals are achieved). Women of all ages are eligible for hypofractionated radiation.    We also discussed ultra hypofractionated radiation with the fast forward regimen.  Reviewed the data from the FAST and FAST FORWARD trial that included radiation over 1 week to the whole breast.  We also discussed the [place] accelerated partial breast irradiation trial.    Finally, we discussed the side effects and risk of WBRT, including the need for CT simulation, tattoo placement, and daily radiation (M-F) x 5-15 days. We discussed the side effects and risks of radiation, including fatigue, radiation dermatitis, and breast pain in the short-term; skin fibrosis, pigmentation, chest wall pain, damage to underlying lung, rib, or heart, and secondary malignancies in the long-term. All questions were answered to the best of our ability and informed consent was obtained.     PLAN:  -CT simulation for treatment planning, plan to simulate supine without before meals and without bolus despite it being left sided  -Consider ultra hypofractionated radiation to either whole breast or partial breast radiation    Edmundo Moyer MD PhD   Professor, Radiation Oncology  3/26/2024    Over 60 minutes was spent in evaluating, counseling, and examining the patient. More than 50% of that time was spent in face to face discussion.

## 2024-03-26 ENCOUNTER — HOSPITAL ENCOUNTER (OUTPATIENT)
Dept: RADIATION ONCOLOGY | Facility: CLINIC | Age: 69
Setting detail: RADIATION/ONCOLOGY SERIES
Discharge: HOME | End: 2024-03-26
Payer: COMMERCIAL

## 2024-03-26 VITALS
HEART RATE: 74 BPM | TEMPERATURE: 98.1 F | OXYGEN SATURATION: 96 % | DIASTOLIC BLOOD PRESSURE: 111 MMHG | RESPIRATION RATE: 18 BRPM | BODY MASS INDEX: 34.81 KG/M2 | SYSTOLIC BLOOD PRESSURE: 161 MMHG | WEIGHT: 222.22 LBS

## 2024-03-26 DIAGNOSIS — E66.09 OBESITY DUE TO EXCESS CALORIES WITHOUT SERIOUS COMORBIDITY, UNSPECIFIED CLASSIFICATION: ICD-10-CM

## 2024-03-26 DIAGNOSIS — N60.99 ATYPICAL DUCTAL HYPERPLASIA OF BREAST: ICD-10-CM

## 2024-03-26 DIAGNOSIS — Z17.0 MALIGNANT NEOPLASM OF CENTRAL PORTION OF LEFT BREAST IN FEMALE, ESTROGEN RECEPTOR POSITIVE (MULTI): Primary | ICD-10-CM

## 2024-03-26 DIAGNOSIS — D80.1 HYPOGAMMAGLOBULINEMIA (MULTI): ICD-10-CM

## 2024-03-26 DIAGNOSIS — K21.9 GASTROESOPHAGEAL REFLUX DISEASE WITHOUT ESOPHAGITIS: ICD-10-CM

## 2024-03-26 DIAGNOSIS — C50.112 MALIGNANT NEOPLASM OF CENTRAL PORTION OF LEFT BREAST IN FEMALE, ESTROGEN RECEPTOR POSITIVE (MULTI): Primary | ICD-10-CM

## 2024-03-26 PROCEDURE — 99205 OFFICE O/P NEW HI 60 MIN: CPT | Performed by: RADIOLOGY

## 2024-03-26 PROCEDURE — 99215 OFFICE O/P EST HI 40 MIN: CPT | Performed by: RADIOLOGY

## 2024-03-26 ASSESSMENT — PATIENT HEALTH QUESTIONNAIRE - PHQ9
SUM OF ALL RESPONSES TO PHQ9 QUESTIONS 1 AND 2: 1
1. LITTLE INTEREST OR PLEASURE IN DOING THINGS: NOT AT ALL
2. FEELING DOWN, DEPRESSED OR HOPELESS: SEVERAL DAYS
10. IF YOU CHECKED OFF ANY PROBLEMS, HOW DIFFICULT HAVE THESE PROBLEMS MADE IT FOR YOU TO DO YOUR WORK, TAKE CARE OF THINGS AT HOME, OR GET ALONG WITH OTHER PEOPLE: SOMEWHAT DIFFICULT

## 2024-03-26 ASSESSMENT — ENCOUNTER SYMPTOMS
OCCASIONAL FEELINGS OF UNSTEADINESS: 0
LOSS OF SENSATION IN FEET: 0
DEPRESSION: 1

## 2024-03-26 ASSESSMENT — PAIN SCALES - GENERAL: PAINLEVEL: 0-NO PAIN

## 2024-03-26 NOTE — ADDENDUM NOTE
Encounter addended by: Casie Behrend, RN on: 3/26/2024 1:02 PM   Actions taken: Patient Education assessment filed, Patient Education title added, Patient Education documented on

## 2024-03-29 ENCOUNTER — OFFICE VISIT (OUTPATIENT)
Dept: HEMATOLOGY/ONCOLOGY | Facility: HOSPITAL | Age: 69
End: 2024-03-29
Payer: COMMERCIAL

## 2024-03-29 VITALS — TEMPERATURE: 97.7 F | WEIGHT: 216.93 LBS | BODY MASS INDEX: 34.86 KG/M2 | HEIGHT: 66 IN | RESPIRATION RATE: 20 BRPM

## 2024-03-29 DIAGNOSIS — Z17.0 MALIGNANT NEOPLASM OF CENTRAL PORTION OF LEFT BREAST IN FEMALE, ESTROGEN RECEPTOR POSITIVE (MULTI): ICD-10-CM

## 2024-03-29 DIAGNOSIS — C50.112 MALIGNANT NEOPLASM OF CENTRAL PORTION OF LEFT BREAST IN FEMALE, ESTROGEN RECEPTOR POSITIVE (MULTI): ICD-10-CM

## 2024-03-29 PROCEDURE — 1160F RVW MEDS BY RX/DR IN RCRD: CPT | Performed by: STUDENT IN AN ORGANIZED HEALTH CARE EDUCATION/TRAINING PROGRAM

## 2024-03-29 PROCEDURE — 99215 OFFICE O/P EST HI 40 MIN: CPT | Performed by: STUDENT IN AN ORGANIZED HEALTH CARE EDUCATION/TRAINING PROGRAM

## 2024-03-29 PROCEDURE — 99205 OFFICE O/P NEW HI 60 MIN: CPT | Performed by: STUDENT IN AN ORGANIZED HEALTH CARE EDUCATION/TRAINING PROGRAM

## 2024-03-29 PROCEDURE — 1126F AMNT PAIN NOTED NONE PRSNT: CPT | Performed by: STUDENT IN AN ORGANIZED HEALTH CARE EDUCATION/TRAINING PROGRAM

## 2024-03-29 PROCEDURE — 3008F BODY MASS INDEX DOCD: CPT | Performed by: STUDENT IN AN ORGANIZED HEALTH CARE EDUCATION/TRAINING PROGRAM

## 2024-03-29 PROCEDURE — 1159F MED LIST DOCD IN RCRD: CPT | Performed by: STUDENT IN AN ORGANIZED HEALTH CARE EDUCATION/TRAINING PROGRAM

## 2024-03-29 ASSESSMENT — PAIN SCALES - GENERAL: PAINLEVEL: 0-NO PAIN

## 2024-03-29 NOTE — PROGRESS NOTES
Patient ID: Bianca Cornell is a 69 y.o. female.    The patient presents to clinic today for her history of breast cancer.    Cancer Staging   Malignant neoplasm of central portion of left breast in female, estrogen receptor positive (CMS/HCC)  Staging form: Breast, AJCC 8th Edition  - Pathologic: Stage IA (pT1a, pN0(sn), cM0, G2, ER+, MD+, HER2-) - Signed by Constance Sweeney DO on 3/22/2024        Diagnostic/Therapeutic History:    - 2022: excisional biopsy by Dr Kendrick for right atypical ductal hyperplasia    - On : Diagnostic imaging with bl Diagnostic mammogram and US showed a 0.4 cm mass at 9 oclock 5 CFN, 3 morphologically normal axillary LN    - On 2024:  Left breast stereotactic guided core needle biopsy showed IDC G2 ER: 95%, MD: 95%, HER2 negative (0)    - On 2024: Dr Sweeney left PM with SLNB (0/2)  showed a 4mm focus G2 final stage [id number] ER: 95%, MD: 95%, HER2 negative     History of Present Illness (HPI)/Interval History:  Doing well - recovering well from surgery    She denies any fevers or chills.    She denies any chest pain or breathing issues.     She denies any new or unexplained bone aches or pains.    She reports a normal appetite and normal bowel movements. Her weight is stable.     She denies any issues with sleep or fatigue.       PMH: Hypogammaglobunemia, has recurrent infections on suppressive antibiotics,    PSH: left shoulder surgery, hx of Necrotizing fascitis in 2019 s/p debridement    Allergies & Meds: reviewed    Reproductive hx: Menarche at age 15, , AFLB: 18.  no OCP, no HRT, menopause at 51    Family history: negative     Review of Systems:  14-point ROS otherwise negative, as per HPI.    Past Medical History:   Diagnosis Date    Atypical ductal hyperplasia, breast     Breast cancer (CMS/HCC)     left    Breast cancer (CMS/HCC)     Hypertension     Hypogammaglobulinemia (CMS/HCC)     Necrotizing fasciitis (CMS/HCC)     right thigh      Personal history of other diseases of the female genital tract     History of endometriosis    PONV (postoperative nausea and vomiting)     Unspecified benign mammary dysplasia of unspecified breast 2021    Atypical ductal hyperplasia of breast       Past Surgical History:   Procedure Laterality Date    APPENDECTOMY  2013    Appendectomy    BI MAMMO GUIDED LOCALIZATION BREAST RIGHT Right 2022    BI MAMMO GUIDED LOCALIZATION BREAST RIGHT 2022 [facility]    BREAST BIOPSY      ENDOMETRIAL ABLATION      OTHER SURGICAL HISTORY  2021    Tubal ligation    SHOULDER SURGERY      TUBAL LIGATION         Social History     Socioeconomic History    Marital status:      Spouse name: Not on file    Number of children: Not on file    Years of education: Not on file    Highest education level: Not on file   Occupational History    Not on file   Tobacco Use    Smoking status: Former     Years: 20     Types: Cigarettes     Quit date: 3/1/2019     Years since quittin.0     Passive exposure: Never    Smokeless tobacco: Never   Vaping Use    Vaping Use: Never used   Substance and Sexual Activity    Alcohol use: Never     Comment: 0    Drug use: Yes     Types: Marijuana     Comment: occas use of marijuana    Sexual activity: Not on file   Other Topics Concern    Not on file   Social History Narrative    Not on file     Social Determinants of Health     Financial Resource Strain: Not on file   Food Insecurity: Not on file   Transportation Needs: Not on file   Physical Activity: Not on file   Stress: Not on file   Social Connections: Not on file   Intimate Partner Violence: Not on file   Housing Stability: Not on file       Allergies   Allergen Reactions    Clindamycin Nausea/vomiting     Oral - caused pancreatitis per pt    Dexilant [Dexlansoprazole] Nausea/vomiting     caused pancreatitis per pt    Flagyl [Metronidazole] Nausea/vomiting     caused pancreatitis per pt    Lisinopril  "Nausea/vomiting     caused pancreatitis per pt    Morphine Rash and Nausea/vomiting         Current Outpatient Medications:     Bacillus subtilis-inulin 1.5 billion cell-1 gram tablet,chewable, Chew 1 tablet 2 times a day., Disp: , Rfl:     calcium carbonate/vitamin D3 (CALCIUM 600 WITH VITAMIN D3 ORAL), Take 2 tablets by mouth 2 times a day., Disp: , Rfl:     hydrALAZINE (Apresoline) 25 mg tablet, Take 1 tablet (25 mg) by mouth 3 times a day., Disp: , Rfl:     multivitamin with minerals tablet, Take 1 tablet by mouth once daily., Disp: , Rfl:     omega 3-dha-epa-fish oil (Fish OiL) 1,000 mg (120 mg-180 mg) capsule, Take 1 capsule (1,000 mg) by mouth 2 times a day., Disp: , Rfl:     sulfamethoxazole-trimethoprim (Bactrim DS) 800-160 mg tablet, Take 1 tablet by mouth once daily., Disp: , Rfl:     doxycycline (Adoxa) 100 mg tablet, Take 1 tablet (100 mg) by mouth once daily. Take with a full glass of water and do not lie down for at least 30 minutes after, Disp: , Rfl:     eszopiclone (Lunesta) 1 mg tablet, Take 1 tablet (1 mg) by mouth once daily at bedtime. Take immediately before bedtime, Disp: , Rfl:      Objective    BSA: 2.14 meters squared  Temp 36.5 °C (97.7 °F) (Temporal)   Resp 20   Ht 1.679 m (5' 6.1\")   Wt 98.4 kg (216 lb 14.9 oz)   LMP  (LMP Unknown)   BMI 34.91 kg/m²     ECOG Performance Status: 1    Physical Exam    GA: alert, oriented, cooperative  HEENT: anicteric sclera, injected conjunctiva  Heart: RRR, no murmurs  Lung: CTAB  Abd: +BS, soft, non tender  Ext: peripheral pulses positive, warm extremity  Breast: well healing incision, no new nodules or lymphadenopathy      Laboratory Data:  Lab Results   Component Value Date    WBC 11.7 (H) 02/14/2024    HGB 14.4 02/14/2024    HCT 42.7 02/14/2024    MCV 85 02/14/2024     02/14/2024       Chemistry    Lab Results   Component Value Date/Time     02/14/2024 1519    K 3.8 02/14/2024 1519     02/14/2024 1519    CO2 25 02/14/2024 " 1519    BUN 14 02/14/2024 1519    CREATININE 0.74 02/14/2024 1519    Lab Results   Component Value Date/Time    CALCIUM 10.2 02/14/2024 1519    ALKPHOS 71 01/24/2024 0952    AST 15 01/24/2024 0952    ALT 18 01/24/2024 0952    BILITOT 0.4 01/24/2024 0952             Radiology:  NM injection only for sentinel node biopsy  no scan performed  Narrative: Interpreted By:  Marco Cleaning and Maltbie Grace   STUDY:  NM INJECTION ONLY FOR SENTINEL NODE BIOPSY  NO SCAN PERFORMED;  3/1/2024 9:22 am      INDICATION:  Signs/Symptoms:left breast.      COMPARISON:  None.      ACCESSION NUMBER(S):  [id number]      ORDERING CLINICIAN:  NANCI ARREGUIN      TECHNIQUE:  DIVISION OF NUCLEAR MEDICINE  BREAST SENTINEL NODE LOCALIZATION      Syringes containing a total of  0.6 millicuries of Tc-99m tilmanocept  (Lymphoseek) sentinel lymph node localization for lymph node  dissection/biopsy.      FINDINGS:  Injection only, no images were acquired.      Impression: 1. Patient with breast carcinoma undergoing peritumoral Tc-99m  tilmanocept (Lymphoseek) injection for intraoperative sentinel lymph  node localization.      I personally reviewed the images/study and I agree with the findings  as stated by Nuclear Medicine fellow Yvonne Mccoy MD. This study  was interpreted at [place], [place].      MACRO:  None      Signed by: Marco Cleaning 3/5/2024 7:52 AM  Dictation workstation:   [id number]       BI mammo left diagnostic tomosynthesis 01/11/2024  BI US breast limited left 01/11/2024    Narrative  Interpreted By:  Salima Mendoza and Avery Ross  STUDY:  BI MAMMO LEFT DIAGNOSTIC TOMOSYNTHESIS; BI US BREAST LIMITED LEFT;  1/11/2024 11:59 am; 1/11/2024 12:32 pm    ACCESSION NUMBER(S):  [id number]; [id number]    ORDERING CLINICIAN:  ILIANA BEE    INDICATION:  The patient was recalled from recent screening mammogram 01/11/2024  for a left breast mass.    COMPARISON:  01/11/2024, 08/30/2022,  01/21/2022.    FINDINGS:  MAMMOGRAPHY: 2D and tomosynthesis images were reviewed at 1 mm slice  thickness.    Density:  The breast tissue is heterogeneously dense, which may  obscure small masses.    Spot compression views demonstrate an irregular spiculated equal  density mass in the central medial left breast at anterior depth.    ULTRASOUND:  A targeted ultrasound of the left breast and axilla was  performed using elastography.    An irregular not parallel hypoechoic mass with angular margins and  posterior shadowing is seen at 9 o'clock 5 cm from the nipple. The  mass measures 0.3 x 0.2 x 0.4 cm. It is avascular and soft on  elastography. This corresponds with the mammographic left breast mass.    A targeted ultrasound of the left axilla demonstrates 3  morphologically normal axillary lymph nodes.    Impression  Suspicious left breast mass without axillary lymphadenopathy. Further  evaluation with surgical consultation and ultrasound-guided biopsy is  recommended. The patient is scheduled to see Lora Smith in  clinic today to discuss the findings and recommendations. A message  was sent to the referring practitioner at the time of this dictation  regarding these critical findings using the Epic notification system.  A pre-procedure form was filled out.    Method of Detection: Category Sdbt - 3D Screening    BI-RADS CATEGORY:    BI-RADS Category:  4 Suspicious.  Recommendation:  Biopsy.  Recommended Date:  Immediate.  Laterality:  Left.    For any future breast imaging appointments, please call [phone]).    I personally reviewed the images/study and I agree with the findings  as stated by fellow physician, Dr. Donnell Mcgraw.      MACRO:  Critical Finding:  Breast Imaging Abnormality. Notification was  initiated on 1/11/2024 at 12:35 pm by  Donnell Mcgraw.  (**-YCF-**)  Instructions:  See Impression for specific recommendations.    Signed by: Salima Mendoza 1/11/2024 1:00 PM  Dictation workstation:    [id number]          Assessment/Plan:    69 year old female patient with hx of hypogammaglobunemia, has has mutiple infections in the past including Nec Fascitis s/p multiple surgeries    - 01/22/2022: excisional biopsy by Dr Kendrick for right atypical ductal hyperplasia    - On 01/11/204: Diagnostic imaging with bl Diagnostic mammogram and US showed a 0.4 cm mass at 9 oclock 5 CFN, 3 morphologically normal axillary LN    - On 01/12/2024:  Left breast stereotactic guided core needle biopsy showed IDC G2 ER: 95%, ME: 95%, HER2 negative (0)    - On 03/01/2024: Dr Sweeney left PM with SLNB (0/2)  showed a 4mm focus G2 final stage [id number] ER: 95%, ME: 95%, HER2 negative     I reviewed with her the events that led to her diagnosis of breast cancer. We reviewed all the procedures and diagnostic imaging she underwent thus far. I discussed the features of her breast cancer that include the size, grade, lymph node status and hormone receptor/ her2-caryn status.     - Plan for radiation therapy, we will follow up further after radiation to discuss  hormonal therapy. Currently undecided- given info about anastrozole. On Ca/vit D     RTC in      At least 60 minutes of direct consultation was spent reviewing the patient's chart as well as discussing and  reviewing the  cancer care plan including educating and answering questions and concerns, greater than 50 percent spent in counseling and coordination of care.         Nakul Baugh MD  Hematology and Medical Oncology  [facility]

## 2024-04-02 ENCOUNTER — HOSPITAL ENCOUNTER (OUTPATIENT)
Dept: RADIATION ONCOLOGY | Facility: CLINIC | Age: 69
Setting detail: RADIATION/ONCOLOGY SERIES
Discharge: HOME | End: 2024-04-02
Payer: COMMERCIAL

## 2024-04-02 ENCOUNTER — HOSPITAL ENCOUNTER (OUTPATIENT)
Dept: RADIOLOGY | Facility: EXTERNAL LOCATION | Age: 69
Discharge: HOME | End: 2024-04-02

## 2024-04-02 DIAGNOSIS — C50.212 MALIGNANT NEOPLASM OF UPPER-INNER QUADRANT OF LEFT FEMALE BREAST, UNSPECIFIED ESTROGEN RECEPTOR STATUS (MULTI): ICD-10-CM

## 2024-04-02 PROCEDURE — 77332 RADIATION TREATMENT AID(S): CPT | Performed by: RADIOLOGY

## 2024-04-02 PROCEDURE — 77332 RADIATION TREATMENT AID(S): CPT | Mod: 59 | Performed by: RADIOLOGY

## 2024-04-02 PROCEDURE — 77263 THER RADIOLOGY TX PLNG CPLX: CPT | Performed by: RADIOLOGY

## 2024-04-02 PROCEDURE — 77334 RADIATION TREATMENT AID(S): CPT | Performed by: RADIOLOGY

## 2024-04-03 ENCOUNTER — HOSPITAL ENCOUNTER (OUTPATIENT)
Dept: RADIATION ONCOLOGY | Facility: CLINIC | Age: 69
Setting detail: RADIATION/ONCOLOGY SERIES
Discharge: HOME | End: 2024-04-03
Payer: COMMERCIAL

## 2024-04-03 PROCEDURE — 77301 RADIOTHERAPY DOSE PLAN IMRT: CPT | Performed by: RADIOLOGY

## 2024-04-03 PROCEDURE — 77300 RADIATION THERAPY DOSE PLAN: CPT | Performed by: RADIOLOGY

## 2024-04-03 PROCEDURE — 77338 DESIGN MLC DEVICE FOR IMRT: CPT | Performed by: RADIOLOGY

## 2024-04-15 ENCOUNTER — APPOINTMENT (OUTPATIENT)
Dept: RADIATION ONCOLOGY | Facility: CLINIC | Age: 69
End: 2024-04-15
Payer: COMMERCIAL

## 2024-04-16 ENCOUNTER — RADIATION ONCOLOGY OTV (OUTPATIENT)
Dept: RADIATION ONCOLOGY | Facility: CLINIC | Age: 69
End: 2024-04-16

## 2024-04-16 ENCOUNTER — HOSPITAL ENCOUNTER (OUTPATIENT)
Dept: RADIATION ONCOLOGY | Facility: CLINIC | Age: 69
Setting detail: RADIATION/ONCOLOGY SERIES
Discharge: HOME | End: 2024-04-16
Payer: COMMERCIAL

## 2024-04-16 VITALS — BODY MASS INDEX: 34.94 KG/M2 | WEIGHT: 217.15 LBS | TEMPERATURE: 97.2 F

## 2024-04-16 DIAGNOSIS — C50.812 MALIGNANT NEOPLASM OF OVERLAPPING SITES OF LEFT FEMALE BREAST (MULTI): ICD-10-CM

## 2024-04-16 LAB
RAD ONC MSQ ACTUAL FRACTIONS DELIVERED: 1
RAD ONC MSQ ACTUAL SESSION DELIVERED DOSE: 600 CGRAY
RAD ONC MSQ ACTUAL TOTAL DOSE: 600 CGRAY
RAD ONC MSQ ELAPSED DAYS: 0
RAD ONC MSQ LAST DATE: NORMAL
RAD ONC MSQ PRESCRIBED FRACTIONAL DOSE: 600 CGRAY
RAD ONC MSQ PRESCRIBED NUMBER OF FRACTIONS: 5
RAD ONC MSQ PRESCRIBED TECHNIQUE: NORMAL
RAD ONC MSQ PRESCRIBED TOTAL DOSE: 3000 CGRAY
RAD ONC MSQ PRESCRIPTION PATTERN COMMENT: NORMAL
RAD ONC MSQ START DATE: NORMAL
RAD ONC MSQ TREATMENT COURSE NUMBER: 1
RAD ONC MSQ TREATMENT SITE: NORMAL

## 2024-04-16 PROCEDURE — 77385 HC INTENSITY-MODULATED RADIATION THERAPY (IMRT), SIMPLE: CPT | Performed by: RADIOLOGY

## 2024-04-16 PROCEDURE — 77014 CHG CT GUIDANCE RADIATION THERAPY FLDS PLACEMENT: CPT | Performed by: RADIOLOGY

## 2024-04-16 PROCEDURE — 77427 RADIATION TX MANAGEMENT X5: CPT | Performed by: RADIOLOGY

## 2024-04-16 ASSESSMENT — PAIN SCALES - GENERAL: PAINLEVEL: 0-NO PAIN

## 2024-04-16 NOTE — PROGRESS NOTES
Radiation Oncology On Treatment Visit    Patient Name:  Bianca Cornell  MRN:  [id number]  :  1955    Referring Provider: No ref. provider found  Primary Care Provider: Isaiah Denise DO  Care Team: Patient Care Team:  Isaiah Denise DO as PCP - General (Allergy and Immunology)  Nakul Baugh MD as Consulting Physician (Hematology and Oncology)    Date of Service: 2024     Diagnosis:   Specialty Problems          Radiation Oncology Problems    Malignant neoplasm of central portion of left breast in female, estrogen receptor positive (Multi)         Treatment Summary:  Radiation Treatments       Active   PARTIAL_BR_L (Started on 2024)   Most recent fraction: 600 cGy given on 2024   Total given: 600 cGy / 3,000 cGy  (1 of 5 fractions)   Elapsed Days: 0   Technique: VMAT           Completed   No historical radiation treatments to show.             SUBJECTIVE: First treatment today, reviewed skin care recommendations with patient. Intermittent fatigue.   Patient tolerating radiation treatment without complaint. No new changes. Denies fatigue, swelling, skin changes, pain, itchiness. No other issues this week. Toxicity as noted below      OBJECTIVE:   Vital Signs:  Temp 36.2 °C (97.2 °F) (Temporal)   Wt 98.5 kg (217 lb 2.5 oz)   LMP  (LMP Unknown)   BMI 34.94 kg/m²    Pain Scale: The patient's current pain level was assessed.  They report currently having a pain of 0 out of 10.    Other Pertinent Findings:     Toxicity Assessment          2024    11:00   Toxicity Assessment   Adverse Events Reviewed (WDL) Yes (Within Defined Limits)   Treatment Site Breast   Anorexia Grade 0   Dehydration Grade 0   Dermatitis Radiation Grade 0       reviewed skin care recommendations with patient   Fatigue Grade 1       not sleeping well at night   Nausea Grade 0   Pain Grade 0   Breast Pain Grade 0        Assessment / Plan:  The patient is tolerating radiation therapy as anticipated.  Continue  per current treatment plan.       Edmundo Moyer MD, PhD  Attending Physician

## 2024-04-17 ENCOUNTER — APPOINTMENT (OUTPATIENT)
Dept: RADIATION ONCOLOGY | Facility: CLINIC | Age: 69
End: 2024-04-17
Payer: COMMERCIAL

## 2024-04-18 ENCOUNTER — HOSPITAL ENCOUNTER (OUTPATIENT)
Dept: RADIATION ONCOLOGY | Facility: CLINIC | Age: 69
Setting detail: RADIATION/ONCOLOGY SERIES
Discharge: HOME | End: 2024-04-18
Payer: COMMERCIAL

## 2024-04-18 DIAGNOSIS — Z51.0 ENCOUNTER FOR ANTINEOPLASTIC RADIATION THERAPY: ICD-10-CM

## 2024-04-18 DIAGNOSIS — C50.812 MALIGNANT NEOPLASM OF OVERLAPPING SITES OF LEFT FEMALE BREAST (MULTI): ICD-10-CM

## 2024-04-18 LAB
RAD ONC MSQ ACTUAL FRACTIONS DELIVERED: 2
RAD ONC MSQ ACTUAL SESSION DELIVERED DOSE: 600 CGRAY
RAD ONC MSQ ACTUAL TOTAL DOSE: 1200 CGRAY
RAD ONC MSQ ELAPSED DAYS: 2
RAD ONC MSQ LAST DATE: NORMAL
RAD ONC MSQ PRESCRIBED FRACTIONAL DOSE: 600 CGRAY
RAD ONC MSQ PRESCRIBED NUMBER OF FRACTIONS: 5
RAD ONC MSQ PRESCRIBED TECHNIQUE: NORMAL
RAD ONC MSQ PRESCRIBED TOTAL DOSE: 3000 CGRAY
RAD ONC MSQ PRESCRIPTION PATTERN COMMENT: NORMAL
RAD ONC MSQ START DATE: NORMAL
RAD ONC MSQ TREATMENT COURSE NUMBER: 1
RAD ONC MSQ TREATMENT SITE: NORMAL

## 2024-04-18 PROCEDURE — 77385 HC INTENSITY-MODULATED RADIATION THERAPY (IMRT), SIMPLE: CPT | Performed by: RADIOLOGY

## 2024-04-18 PROCEDURE — 77014 CHG CT GUIDANCE RADIATION THERAPY FLDS PLACEMENT: CPT | Performed by: RADIOLOGY

## 2024-04-19 ENCOUNTER — APPOINTMENT (OUTPATIENT)
Dept: RADIATION ONCOLOGY | Facility: CLINIC | Age: 69
End: 2024-04-19
Payer: COMMERCIAL

## 2024-04-22 ENCOUNTER — HOSPITAL ENCOUNTER (OUTPATIENT)
Dept: RADIATION ONCOLOGY | Facility: CLINIC | Age: 69
Setting detail: RADIATION/ONCOLOGY SERIES
Discharge: HOME | End: 2024-04-22
Payer: COMMERCIAL

## 2024-04-22 DIAGNOSIS — Z51.0 ENCOUNTER FOR ANTINEOPLASTIC RADIATION THERAPY: ICD-10-CM

## 2024-04-22 DIAGNOSIS — C50.812 MALIGNANT NEOPLASM OF OVERLAPPING SITES OF LEFT FEMALE BREAST (MULTI): ICD-10-CM

## 2024-04-22 LAB
RAD ONC MSQ ACTUAL FRACTIONS DELIVERED: 3
RAD ONC MSQ ACTUAL SESSION DELIVERED DOSE: 600 CGRAY
RAD ONC MSQ ACTUAL TOTAL DOSE: 1800 CGRAY
RAD ONC MSQ ELAPSED DAYS: 6
RAD ONC MSQ LAST DATE: NORMAL
RAD ONC MSQ PRESCRIBED FRACTIONAL DOSE: 600 CGRAY
RAD ONC MSQ PRESCRIBED NUMBER OF FRACTIONS: 5
RAD ONC MSQ PRESCRIBED TECHNIQUE: NORMAL
RAD ONC MSQ PRESCRIBED TOTAL DOSE: 3000 CGRAY
RAD ONC MSQ PRESCRIPTION PATTERN COMMENT: NORMAL
RAD ONC MSQ START DATE: NORMAL
RAD ONC MSQ TREATMENT COURSE NUMBER: 1
RAD ONC MSQ TREATMENT SITE: NORMAL

## 2024-04-22 PROCEDURE — 77385 HC INTENSITY-MODULATED RADIATION THERAPY (IMRT), SIMPLE: CPT | Performed by: RADIOLOGY

## 2024-04-22 PROCEDURE — 77014 CHG CT GUIDANCE RADIATION THERAPY FLDS PLACEMENT: CPT | Performed by: RADIOLOGY

## 2024-04-24 ENCOUNTER — HOSPITAL ENCOUNTER (OUTPATIENT)
Dept: RADIATION ONCOLOGY | Facility: CLINIC | Age: 69
Setting detail: RADIATION/ONCOLOGY SERIES
Discharge: HOME | End: 2024-04-24
Payer: COMMERCIAL

## 2024-04-24 DIAGNOSIS — Z51.0 ENCOUNTER FOR ANTINEOPLASTIC RADIATION THERAPY: ICD-10-CM

## 2024-04-24 DIAGNOSIS — C50.812 MALIGNANT NEOPLASM OF OVERLAPPING SITES OF LEFT FEMALE BREAST (MULTI): ICD-10-CM

## 2024-04-24 LAB
RAD ONC MSQ ACTUAL FRACTIONS DELIVERED: 4
RAD ONC MSQ ACTUAL SESSION DELIVERED DOSE: 600 CGRAY
RAD ONC MSQ ACTUAL TOTAL DOSE: 2400 CGRAY
RAD ONC MSQ ELAPSED DAYS: 8
RAD ONC MSQ LAST DATE: NORMAL
RAD ONC MSQ PRESCRIBED FRACTIONAL DOSE: 600 CGRAY
RAD ONC MSQ PRESCRIBED NUMBER OF FRACTIONS: 5
RAD ONC MSQ PRESCRIBED TECHNIQUE: NORMAL
RAD ONC MSQ PRESCRIBED TOTAL DOSE: 3000 CGRAY
RAD ONC MSQ PRESCRIPTION PATTERN COMMENT: NORMAL
RAD ONC MSQ START DATE: NORMAL
RAD ONC MSQ TREATMENT COURSE NUMBER: 1
RAD ONC MSQ TREATMENT SITE: NORMAL

## 2024-04-24 PROCEDURE — 77385 HC INTENSITY-MODULATED RADIATION THERAPY (IMRT), SIMPLE: CPT | Performed by: RADIOLOGY

## 2024-04-24 PROCEDURE — 77014 CHG CT GUIDANCE RADIATION THERAPY FLDS PLACEMENT: CPT | Performed by: RADIOLOGY

## 2024-04-26 ENCOUNTER — HOSPITAL ENCOUNTER (OUTPATIENT)
Dept: RADIATION ONCOLOGY | Facility: CLINIC | Age: 69
Setting detail: RADIATION/ONCOLOGY SERIES
Discharge: HOME | End: 2024-04-26
Payer: COMMERCIAL

## 2024-04-26 ENCOUNTER — DOCUMENTATION (OUTPATIENT)
Dept: RADIATION ONCOLOGY | Facility: CLINIC | Age: 69
End: 2024-04-26
Payer: COMMERCIAL

## 2024-04-26 DIAGNOSIS — C50.812 MALIGNANT NEOPLASM OF OVERLAPPING SITES OF LEFT FEMALE BREAST (MULTI): ICD-10-CM

## 2024-04-26 DIAGNOSIS — Z51.0 ENCOUNTER FOR ANTINEOPLASTIC RADIATION THERAPY: ICD-10-CM

## 2024-04-26 LAB
RAD ONC MSQ ACTUAL FRACTIONS DELIVERED: 5
RAD ONC MSQ ACTUAL SESSION DELIVERED DOSE: 600 CGRAY
RAD ONC MSQ ACTUAL TOTAL DOSE: 3000 CGRAY
RAD ONC MSQ ELAPSED DAYS: 10
RAD ONC MSQ LAST DATE: NORMAL
RAD ONC MSQ PRESCRIBED FRACTIONAL DOSE: 600 CGRAY
RAD ONC MSQ PRESCRIBED NUMBER OF FRACTIONS: 5
RAD ONC MSQ PRESCRIBED TECHNIQUE: NORMAL
RAD ONC MSQ PRESCRIBED TOTAL DOSE: 3000 CGRAY
RAD ONC MSQ PRESCRIPTION PATTERN COMMENT: NORMAL
RAD ONC MSQ START DATE: NORMAL
RAD ONC MSQ TREATMENT COURSE NUMBER: 1
RAD ONC MSQ TREATMENT SITE: NORMAL

## 2024-04-26 PROCEDURE — 77014 CHG CT GUIDANCE RADIATION THERAPY FLDS PLACEMENT: CPT | Performed by: RADIOLOGY

## 2024-04-26 PROCEDURE — 77336 RADIATION PHYSICS CONSULT: CPT | Performed by: RADIOLOGY

## 2024-04-26 PROCEDURE — 77385 HC INTENSITY-MODULATED RADIATION THERAPY (IMRT), SIMPLE: CPT | Performed by: RADIOLOGY

## 2024-04-26 NOTE — PROGRESS NOTES
Radiation Oncology Treatment Summary    Patient Name:  Bianca Cornell  MRN:  [id number]  :  1955    Referring Provider: No ref. provider found  Primary Care Provider: Isaiah Denise DO    Brief History: Bianca Cornell is a 69 y.o. female with Malignant neoplasm of central portion of left breast in female, estrogen receptor positive (Multi), Pathologic: Stage IA (pT1a, pN0(sn), cM0, G2, ER+, LA+, HER2-).  The patient completed radiotherapy as outlined below.    Radiation Treatment Summary:    Radiation Treatments       Active   No active radiation treatments to show.     Completed   PARTIAL_BR_L (Started on 2024)   Most recent fraction: 600 cGy given on 2024   Total given: 3,000 cGy / 3,000 cGy  (5 of 5 fractions)   Elapsed Days: 10   Technique: VMAT                     Please see the patient's [facility] chart for further details regarding the radiation plan, including beam energy.    Concurrent Chemotherapy:  Treatment Plans       No treatment plans exist          CTCAE Toxicity Overview:   Toxicity Assessment          2024    11:00   Toxicity Assessment   Adverse Events Reviewed (WDL) Yes (Within Defined Limits)   Treatment Site Breast   Anorexia Grade 0   Dehydration Grade 0   Dermatitis Radiation Grade 0       reviewed skin care recommendations with patient   Fatigue Grade 1       not sleeping well at night   Nausea Grade 0   Pain Grade 0   Breast Pain Grade 0     Patient Disposition: Follow up appt scheduled with Adia Milan on 24 @ 1:00 PM.      Edmundo Moyer MD, PhD  Attending Physician

## 2024-05-06 NOTE — OP NOTE
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     PROCEDURE:    Left shoulder arthroscopy with arthroscopic rotator cuff repair and  arthroscopic biceps tenodesis and extensive debridement and  subacromial decompression.     ASSISTANT:    Isaiah Urena.    ANESTHESIA:    General and scalene block.    INDICATIONS:    A 68-year-old with a left shoulder pain and MRI demonstrating rotator  cuff tear and biceps tear.  Treatment options including no treatment  reviewed and the decision was made to proceed with surgery.     DESCRIPTION OF PROCEDURE:    The patient was brought to the operating room after scalene block was  performed.  General anesthesia was performed.  She was positioned in  lateral decubitus position, prepped and draped in the usual fashion.  The joint was injected with saline and a posterior arthroscopy portal  established and arthroscopic examination of the joint was performed.  There was full-thickness tear involving the supraspinatus with  extension into the infraspinatus.  There was tearing involving the  entirety of the intra-articular biceps extending into the  extra-articular portion of the biceps tendon, which was over 50% of  the thickness of the tendon.  There was tearing involving the  superior labrum anteriorly and posteriorly as well as along the  inferior labrum.  There was grade 2 chondromalacia along the inferior  glenoid and grade 2 chondromalacia on the posterior aspect of the  humeral head.  There was partial articular side tear involving the  superior aspect of the subscapularis tendon.  Anterior portal was  established.  Motorized shaver was introduced.  Debridement along the  subscapularis tendon was  done and careful visualization was done of  the subscapularis and was intact to the lesser tuberosity.  Debridement along the biceps was done as well as along the  supraspinatus.  Motorized shaver was also used to perform debridement  along the superior labrum.  Motorized shaver was also used to perform  debridement along the inferior labrum.  A motorized shaver was used  to perform chondroplasty along the glenoid.  The arthroscope was  placed anteriorly.  A motorized shaver was introduced to the  posterior portal and chondroplasty along the humeral head was  performed.  Additional labral debridement as well as debridement  along the synovitis posteriorly was done using a motorized shaver.  The decision was made that the biceps tendon would require a  tenodesis.  The arthroscope was placed in the subacromial space.  There was extensive subacromial bursitis.  Lateral portal was  established using combination of the shaver and the radiofrequency  wand.  Subacromial bursectomy was performed.  The CA ligament was  released and then using the motorized bur, acromioplasty was  performed until there was adequate decompression of the subacromial  space.  There was a large anterior impinging osteophyte.  There was  also an inferior osteophyte along the distal clavicle and this was  removed using the bur and the distal clavicle was coplaned.  The  rotator cuff was debrided to healthy-appearing tissue and soft tissue  was debrided from the greater tuberosity to create a bleeding  surface.  Arthrex FiberLink suture was passed around the biceps  tendon and then with the radiofrequency wand, the biceps tendon was  tenotomized at its insertion on the superior labrum.  Two Arthrex  BioComposite corkscrew anchors were placed in the greater tuberosity.   The sutures were passed in a mattress-type fashion through the  rotator cuff and then tied down with arthroscopic knot tying.  Five  of these suture limbs along with the suture that  had been placed  around the biceps tendon were then secured to the lateral cortex of  the humerus using an Arthrex SwiveLock anchor and a very secure  fixation was obtained.  This provided for tenodesis of the biceps  tendon as well as for modified double row fixation of the rotator  cuff.  The shoulder was well irrigated.  Hemostasis was obtained and  the instruments were removed.  Portals were closed with nylon suture.   Sterile dressing and a sling were applied and she tolerated the  procedure well and was taken to recovery room in stable condition.  She received the appropriate preoperative antibiotic within 1 hour of  the skin incision.  No specimen and no complications.       Franko Millard MD    DD:  04/03/2023 09:48:30 EST  DT:  04/03/2023 10:04:56 EST  DICTATION NUMBER:  [id number]  INTERNAL JOB NUMBER:  [id number]             Electronic Signatures:  Franko Millard) (Signed on 03-Apr-2023 11:51)   Authored  Unsigned, Draft (SYS GENERATED) (Entered on 03-Apr-2023 10:04)   Entered    Last Updated: 03-Apr-2023 11:51 by Franko Millard)

## 2024-05-14 ENCOUNTER — OFFICE VISIT (OUTPATIENT)
Dept: HEMATOLOGY/ONCOLOGY | Facility: CLINIC | Age: 69
End: 2024-05-14
Payer: COMMERCIAL

## 2024-05-14 VITALS
DIASTOLIC BLOOD PRESSURE: 80 MMHG | HEART RATE: 75 BPM | RESPIRATION RATE: 16 BRPM | OXYGEN SATURATION: 94 % | WEIGHT: 217.81 LBS | BODY MASS INDEX: 35.05 KG/M2 | TEMPERATURE: 98.2 F | SYSTOLIC BLOOD PRESSURE: 130 MMHG

## 2024-05-14 DIAGNOSIS — C50.112 MALIGNANT NEOPLASM OF CENTRAL PORTION OF LEFT BREAST IN FEMALE, ESTROGEN RECEPTOR POSITIVE (MULTI): ICD-10-CM

## 2024-05-14 DIAGNOSIS — Z17.0 MALIGNANT NEOPLASM OF CENTRAL PORTION OF LEFT BREAST IN FEMALE, ESTROGEN RECEPTOR POSITIVE (MULTI): ICD-10-CM

## 2024-05-14 PROCEDURE — 99214 OFFICE O/P EST MOD 30 MIN: CPT | Performed by: STUDENT IN AN ORGANIZED HEALTH CARE EDUCATION/TRAINING PROGRAM

## 2024-05-14 PROCEDURE — 1160F RVW MEDS BY RX/DR IN RCRD: CPT | Performed by: STUDENT IN AN ORGANIZED HEALTH CARE EDUCATION/TRAINING PROGRAM

## 2024-05-14 PROCEDURE — 1159F MED LIST DOCD IN RCRD: CPT | Performed by: STUDENT IN AN ORGANIZED HEALTH CARE EDUCATION/TRAINING PROGRAM

## 2024-05-14 PROCEDURE — 3079F DIAST BP 80-89 MM HG: CPT | Performed by: STUDENT IN AN ORGANIZED HEALTH CARE EDUCATION/TRAINING PROGRAM

## 2024-05-14 PROCEDURE — 3008F BODY MASS INDEX DOCD: CPT | Performed by: STUDENT IN AN ORGANIZED HEALTH CARE EDUCATION/TRAINING PROGRAM

## 2024-05-14 PROCEDURE — 1126F AMNT PAIN NOTED NONE PRSNT: CPT | Performed by: STUDENT IN AN ORGANIZED HEALTH CARE EDUCATION/TRAINING PROGRAM

## 2024-05-14 PROCEDURE — 3075F SYST BP GE 130 - 139MM HG: CPT | Performed by: STUDENT IN AN ORGANIZED HEALTH CARE EDUCATION/TRAINING PROGRAM

## 2024-05-14 ASSESSMENT — PAIN SCALES - GENERAL: PAINLEVEL: 0-NO PAIN

## 2024-05-14 NOTE — PROGRESS NOTES
Patient ID: Bianca Cornell is a 69 y.o. female.    The patient presents to clinic today for her history of breast cancer.     Cancer Staging   Malignant neoplasm of central portion of left breast in female, estrogen receptor positive (Multi)  Staging form: Breast, AJCC 8th Edition  - Pathologic: Stage IA (pT1a, pN0(sn), cM0, G2, ER+, MS+, HER2-) - Signed by Constance Sweeney DO on 3/22/2024        Diagnostic/Therapeutic History:    - 2022: excisional biopsy by Dr Kendrick for right atypical ductal hyperplasia    - On : Diagnostic imaging with bl Diagnostic mammogram and US showed a 0.4 cm mass at 9 oclock 5 CFN, 3 morphologically normal axillary LN    - On 2024:  Left breast stereotactic guided core needle biopsy showed IDC G2 ER: 95%, MS: 95%, HER2 negative (0)    - On 2024: Dr Sweeney left PM with SLNB (0/2)  showed a 4mm focus G2 final stage [id number] ER: 95%, MS: 95%, HER2 negative     - On 2024: completed radiation therapy     History of Present Illness (HPI)/Interval History:  Doing well - walks 2 miles a day     She denies any fevers or chills.    She denies any chest pain or breathing issues.     She denies any new or unexplained bone aches or pains.    She reports a normal appetite and normal bowel movements. Her weight is stable.     She denies any issues with sleep or fatigue.       PMH: Hypogammaglobunemia, has recurrent infections on suppressive antibiotics,    PSH: left shoulder surgery, hx of Necrotizing fascitis in 2019 s/p debridement    Allergies & Meds: reviewed    Reproductive hx: Menarche at age 15, , AFLB: 18.  no OCP, no HRT, menopause at 51    Family history: negative     Review of Systems:  14-point ROS otherwise negative, as per HPI.    Past Medical History:   Diagnosis Date    Atypical ductal hyperplasia, breast     Breast cancer (Multi)     left    Breast cancer (Multi)     Hypertension     Hypogammaglobulinemia (Multi)     Necrotizing fasciitis  (Multi)     right thigh 2016    Personal history of other diseases of the female genital tract     History of endometriosis    PONV (postoperative nausea and vomiting)     Unspecified benign mammary dysplasia of unspecified breast 2021    Atypical ductal hyperplasia of breast       Past Surgical History:   Procedure Laterality Date    APPENDECTOMY  2013    Appendectomy    BI MAMMO GUIDED LOCALIZATION BREAST RIGHT Right 2022    BI MAMMO GUIDED LOCALIZATION BREAST RIGHT 2022 [facility]    BREAST BIOPSY      ENDOMETRIAL ABLATION      OTHER SURGICAL HISTORY  2021    Tubal ligation    SHOULDER SURGERY      TUBAL LIGATION         Social History     Socioeconomic History    Marital status:      Spouse name: Not on file    Number of children: Not on file    Years of education: Not on file    Highest education level: Not on file   Occupational History    Not on file   Tobacco Use    Smoking status: Former     Current packs/day: 0.00     Types: Cigarettes     Start date: 3/1/1999     Quit date: 3/1/2019     Years since quittin.2     Passive exposure: Never    Smokeless tobacco: Never   Vaping Use    Vaping status: Never Used   Substance and Sexual Activity    Alcohol use: Never     Comment: 0    Drug use: Yes     Types: Marijuana     Comment: occas use of marijuana    Sexual activity: Not on file   Other Topics Concern    Not on file   Social History Narrative    Not on file     Social Determinants of Health     Financial Resource Strain: Not on file   Food Insecurity: Not on file   Transportation Needs: Not on file   Physical Activity: Not on file   Stress: Not on file   Social Connections: Not on file   Intimate Partner Violence: Not on file   Housing Stability: Not on file       Allergies   Allergen Reactions    Clindamycin Nausea/vomiting     Oral - caused pancreatitis per pt    Dexilant [Dexlansoprazole] Nausea/vomiting     caused pancreatitis per pt    Flagyl  [Metronidazole] Nausea/vomiting     caused pancreatitis per pt    Lisinopril Nausea/vomiting     caused pancreatitis per pt    Morphine Rash and Nausea/vomiting         Current Outpatient Medications:     Bacillus subtilis-inulin 1.5 billion cell-1 gram tablet,chewable, Chew 1 tablet 2 times a day., Disp: , Rfl:     calcium carbonate/vitamin D3 (CALCIUM 600 WITH VITAMIN D3 ORAL), Take 2 tablets by mouth 2 times a day., Disp: , Rfl:     doxycycline (Adoxa) 100 mg tablet, Take 1 tablet (100 mg) by mouth once daily. Take with a full glass of water and do not lie down for at least 30 minutes after, Disp: , Rfl:     eszopiclone (Lunesta) 1 mg tablet, Take 1 tablet (1 mg) by mouth once daily at bedtime. Take immediately before bedtime, Disp: , Rfl:     hydrALAZINE (Apresoline) 25 mg tablet, Take 1 tablet (25 mg) by mouth 3 times a day., Disp: , Rfl:     multivitamin with minerals tablet, Take 1 tablet by mouth once daily., Disp: , Rfl:     omega 3-dha-epa-fish oil (Fish OiL) 1,000 mg (120 mg-180 mg) capsule, Take 1 capsule (1,000 mg) by mouth 2 times a day., Disp: , Rfl:     sulfamethoxazole-trimethoprim (Bactrim DS) 800-160 mg tablet, Take 1 tablet by mouth once daily., Disp: , Rfl:      Objective    BSA: 2.15 meters squared  /80 (BP Location: Right arm, Patient Position: Sitting)   Pulse 75   Temp 36.8 °C (98.2 °F) (Temporal)   Resp 16   Wt 98.8 kg (217 lb 13 oz)   LMP  (LMP Unknown)   SpO2 94%   BMI 35.05 kg/m²     ECOG Performance Status: 1    Physical Exam    GA: alert, oriented, cooperative  HEENT: anicteric sclera, injected conjunctiva  Heart: RRR, no murmurs  Lung: CTAB  Abd: +BS, soft, non tender  Ext: peripheral pulses positive, warm extremity  Breast: well healing incision, no new nodules or lymphadenopathy      Laboratory Data:  Lab Results   Component Value Date    WBC 11.7 (H) 02/14/2024    HGB 14.4 02/14/2024    HCT 42.7 02/14/2024    MCV 85 02/14/2024     02/14/2024       Chemistry     Lab Results   Component Value Date/Time     02/14/2024 1519    K 3.8 02/14/2024 1519     02/14/2024 1519    CO2 25 02/14/2024 1519    BUN 14 02/14/2024 1519    CREATININE 0.74 02/14/2024 1519    Lab Results   Component Value Date/Time    CALCIUM 10.2 02/14/2024 1519    ALKPHOS 71 01/24/2024 0952    AST 15 01/24/2024 0952    ALT 18 01/24/2024 0952    BILITOT 0.4 01/24/2024 0952             Radiology:  Rad Onc CT Sim Images  These images are not reportable by radiology and will not be interpreted   by  Radiologists.       BI mammo left diagnostic tomosynthesis 01/11/2024  BI US breast limited left 01/11/2024    Narrative  Interpreted By:  Salima Mendoza  and Lucien Jacobo  STUDY:  BI MAMMO LEFT DIAGNOSTIC TOMOSYNTHESIS; BI US BREAST LIMITED LEFT;  1/11/2024 11:59 am; 1/11/2024 12:32 pm    ACCESSION NUMBER(S):  [id number]; [id number]    ORDERING CLINICIAN:  ILIANA BEE    INDICATION:  The patient was recalled from recent screening mammogram 01/11/2024  for a left breast mass.    COMPARISON:  01/11/2024, 08/30/2022, 01/21/2022.    FINDINGS:  MAMMOGRAPHY: 2D and tomosynthesis images were reviewed at 1 mm slice  thickness.    Density:  The breast tissue is heterogeneously dense, which may  obscure small masses.    Spot compression views demonstrate an irregular spiculated equal  density mass in the central medial left breast at anterior depth.    ULTRASOUND:  A targeted ultrasound of the left breast and axilla was  performed using elastography.    An irregular not parallel hypoechoic mass with angular margins and  posterior shadowing is seen at 9 o'clock 5 cm from the nipple. The  mass measures 0.3 x 0.2 x 0.4 cm. It is avascular and soft on  elastography. This corresponds with the mammographic left breast mass.    A targeted ultrasound of the left axilla demonstrates 3  morphologically normal axillary lymph nodes.    Impression  Suspicious left breast mass without axillary lymphadenopathy.  Further  evaluation with surgical consultation and ultrasound-guided biopsy is  recommended. The patient is scheduled to see Lora Smith in  clinic today to discuss the findings and recommendations. A message  was sent to the referring practitioner at the time of this dictation  regarding these critical findings using the Epic notification system.  A pre-procedure form was filled out.    Method of Detection: Category Sdbt - 3D Screening    BI-RADS CATEGORY:    BI-RADS Category:  4 Suspicious.  Recommendation:  Biopsy.  Recommended Date:  Immediate.  Laterality:  Left.    For any future breast imaging appointments, please call [phone]  [phone]).    I personally reviewed the images/study and I agree with the findings  as stated by fellow physician, Dr. Donnell Mcgraw.      MACRO:  Critical Finding:  Breast Imaging Abnormality. Notification was  initiated on 1/11/2024 at 12:35 pm by  Donnell Mcgraw.  (**-YCF-**)  Instructions:  See Impression for specific recommendations.    Signed by: Salima Mendoza 1/11/2024 1:00 PM  Dictation workstation:   [id number]          Assessment/Plan:    69 year old female patient with hx of hypogammaglobunemia, has has mutiple infections in the past including Nec Fascitis s/p multiple surgeries    - 01/22/2022: excisional biopsy by Dr Kendrick for right atypical ductal hyperplasia    - On 01/11/204: Diagnostic imaging with bl Diagnostic mammogram and US showed a 0.4 cm mass at 9 oclock 5 CFN, 3 morphologically normal axillary LN    - On 01/12/2024:  Left breast stereotactic guided core needle biopsy showed IDC G2 ER: 95%, MN: 95%, HER2 negative (0)    - On 03/01/2024: Dr Sweeney left PM with SLNB (0/2)  showed a 4mm focus G2 final stage [id number] ER: 95%, MN: 95%, HER2 negative     I reviewed with her the events that led to her diagnosis of breast cancer. We reviewed all the procedures and diagnostic imaging she underwent thus far. I discussed the features of her breast cancer that include  the size, grade, lymph node status and hormone receptor/ her2-caryn status.     Completed radiation therapy- deferring hormonal therapy because she was concerned  of side effects. On active surveillance     RTC in  3M with Myriam Ramirez     At least 35 minutes of direct consultation was spent reviewing the patient's chart as well as discussing and  reviewing the  cancer care plan including educating and answering questions and concerns, greater than 50 percent spent in counseling and coordination of care.         Nakul Baugh MD  Hematology and Medical Oncology  [facility]

## 2024-05-21 ENCOUNTER — HOSPITAL ENCOUNTER (OUTPATIENT)
Dept: RADIATION ONCOLOGY | Facility: CLINIC | Age: 69
Setting detail: RADIATION/ONCOLOGY SERIES
Discharge: HOME | End: 2024-05-21
Payer: COMMERCIAL

## 2024-05-21 VITALS
BODY MASS INDEX: 33.88 KG/M2 | TEMPERATURE: 97.5 F | WEIGHT: 210.54 LBS | DIASTOLIC BLOOD PRESSURE: 72 MMHG | SYSTOLIC BLOOD PRESSURE: 123 MMHG | RESPIRATION RATE: 18 BRPM | OXYGEN SATURATION: 98 % | HEART RATE: 92 BPM

## 2024-05-21 DIAGNOSIS — Z17.0 MALIGNANT NEOPLASM OF CENTRAL PORTION OF LEFT BREAST IN FEMALE, ESTROGEN RECEPTOR POSITIVE (MULTI): Primary | ICD-10-CM

## 2024-05-21 DIAGNOSIS — C50.112 MALIGNANT NEOPLASM OF CENTRAL PORTION OF LEFT BREAST IN FEMALE, ESTROGEN RECEPTOR POSITIVE (MULTI): Primary | ICD-10-CM

## 2024-05-21 ASSESSMENT — PAIN SCALES - GENERAL: PAINLEVEL: 0-NO PAIN

## 2024-05-21 NOTE — PROGRESS NOTES
Radiation Oncology Follow-Up    Patient Name:  Bianca Cornell  MRN:  [id number]  :  1955    Referring Provider: Constance Sweeney DO  Primary Care Provider: Isaiah Denise DO  Care Team: Patient Care Team:  Isaiah Denise DO as PCP - General (Allergy and Immunology)  Nakul Baugh MD as Consulting Physician (Hematology and Oncology)    Date of Service: 2024      Cancer Staging   Malignant neoplasm of central portion of left breast in female, estrogen receptor positive (Multi)  Staging form: Breast, AJCC 8th Edition  - Pathologic: Stage IA (pT1a, pN0(sn), cM0, G2, ER+, MS+, HER2-) - Signed by Constance Sweeney DO on 3/22/2024     Diagnostic/Therapeutic History:     - 2022: excisional biopsy by Dr Kendrick for right atypical ductal hyperplasia     - On : Diagnostic imaging with bl Diagnostic mammogram and US showed a 0.4 cm mass at 9 oclock 5 CFN, 3 morphologically normal axillary LN     - On 2024:  Left breast stereotactic guided core needle biopsy showed IDC G2 ER: 95%, MS: 95%, HER2 negative (0)     - On 2024: Dr Sweeney left PM with SLNB (0/2)  showed a 4mm focus G2 final stage [id number] ER: 95%, MS: 95%, HER2 negative      - 24 - 2024: partial breast radiation therapy to left breast consisting of a dose of 30 Gy given in 5 fractions of 6 Gy per fraction.      - Pt declined adjuvant endocrine therapy    SUBJECTIVE  History of Present Illness:   Bianca Cornell is here today for routine radiation follow up/initial radiation survivorship visit.  She is doing well.  She endorses mild fatigue but is trying to walk daily.  Breast is healing well and skin is intact.  She has some discoloration in the radiation field.  She has declined adjuvant endocrine therapy - she is very sensitive to medications and does not want the side effects.  No headaches, fever, chills, cough, SOB, chest pain, n/v/d/c or bony pain.  She has chronic insomnia and just started taking  Lunesta.  She also reports some intermittent tingling in her fingertips.  She says she has some issues with cervical spine and see chiropractor for this.     Review of Systems:    Review of Systems   All other systems reviewed and are negative.    Performance Status:   The Karnofsky performance scale today is 90, Able to carry on normal activity; minor signs or symptoms of disease (ECOG equivalent 0).      OBJECTIVE  Vital Signs:  /72 (BP Location: Right arm, Patient Position: Sitting, BP Cuff Size: Large adult)   Pulse 92   Temp 36.4 °C (97.5 °F) (Temporal)   Resp 18   Wt 95.5 kg (210 lb 8.6 oz)   LMP  (LMP Unknown)   SpO2 98%   BMI 33.88 kg/m²      Current Outpatient Medications:     Bacillus subtilis-inulin 1.5 billion cell-1 gram tablet,chewable, Chew 1 tablet 2 times a day., Disp: , Rfl:     calcium carbonate/vitamin D3 (CALCIUM 600 WITH VITAMIN D3 ORAL), Take 2 tablets by mouth 2 times a day., Disp: , Rfl:     doxycycline (Adoxa) 100 mg tablet, Take 1 tablet (100 mg) by mouth once daily. Take with a full glass of water and do not lie down for at least 30 minutes after, Disp: , Rfl:     eszopiclone (Lunesta) 1 mg tablet, Take 1 tablet (1 mg) by mouth once daily at bedtime. Take immediately before bedtime, Disp: , Rfl:     hydrALAZINE (Apresoline) 25 mg tablet, Take 1 tablet (25 mg) by mouth 3 times a day., Disp: , Rfl:     multivitamin with minerals tablet, Take 1 tablet by mouth once daily., Disp: , Rfl:     omega 3-dha-epa-fish oil (Fish OiL) 1,000 mg (120 mg-180 mg) capsule, Take 1 capsule (1,000 mg) by mouth 2 times a day., Disp: , Rfl:     sulfamethoxazole-trimethoprim (Bactrim DS) 800-160 mg tablet, Take 1 tablet by mouth once daily., Disp: , Rfl:      Physical Exam  Chest:   Breasts:     Right: No swelling, inverted nipple, mass, nipple discharge or skin change.      Left: No swelling, inverted nipple, mass or nipple discharge.          Comments: Medical central area of left breast with  tanning in radiation field.  Surgical incision well healed with thickening consistent with post surgical changes.   Lymphadenopathy:      Upper Body:      Right upper body: No supraclavicular or axillary adenopathy.      Left upper body: No supraclavicular or axillary adenopathy.       ASSESSMENT/PLAN:  69 y.o. female with early stage left breast cancer s/p breast conserving surgery followed by radiation. Cosmesis is excellent.  Reviewed skin care, possible late effects of treatment, follow up plan.  Mammogram and FUV with Dr. Sweeney.  Radiation follow up in 6 mo.  Call with any questions or concerns.     Abby Milan CNP  [phone]

## 2024-06-19 ENCOUNTER — HOSPITAL ENCOUNTER (OUTPATIENT)
Dept: RADIOLOGY | Facility: CLINIC | Age: 69
Discharge: HOME | End: 2024-06-19
Payer: COMMERCIAL

## 2024-06-19 ENCOUNTER — CLINICAL SUPPORT (OUTPATIENT)
Dept: ORTHOPEDIC SURGERY | Facility: CLINIC | Age: 69
End: 2024-06-19
Payer: COMMERCIAL

## 2024-06-19 DIAGNOSIS — M25.511 ACUTE PAIN OF RIGHT SHOULDER: ICD-10-CM

## 2024-06-19 PROCEDURE — 73030 X-RAY EXAM OF SHOULDER: CPT | Mod: RIGHT SIDE | Performed by: RADIOLOGY

## 2024-06-19 PROCEDURE — 99214 OFFICE O/P EST MOD 30 MIN: CPT | Performed by: FAMILY MEDICINE

## 2024-06-19 PROCEDURE — 73030 X-RAY EXAM OF SHOULDER: CPT | Mod: RT

## 2024-06-19 RX ORDER — TRAMADOL HYDROCHLORIDE 50 MG/1
50 TABLET ORAL EVERY 8 HOURS PRN
Qty: 21 TABLET | Refills: 0 | Status: SHIPPED | OUTPATIENT
Start: 2024-06-19 | End: 2024-06-26

## 2024-06-19 NOTE — PROGRESS NOTES
History of Present Illness   Chief Complaint   Patient presents with    Right Shoulder - Pain     FOR 1 DAY  PATIENT FELL AGAINST WALL USING ARM TO STOP HER FALL       The patient is 69 y.o. right-hand-dominant female  here with a complaint of right shoulder pain.  Acute onset of symptoms yesterday evening, says she tripped over a stool in her kitchen, grabbed the kitchen counter with her right arm to keep her from falling with acute onset of pain in her right shoulder, limited range of motion, pain since injury occurred, pain somewhat diffuse over the anterior lateral aspect of her shoulder with some radiation of pain down the upper arm.  She has a hard time sleeping last night secondary to pain, says she is not able to lift her arm any meaningful amount with significant exacerbation of pain.  Patient with history of left shoulder rotator cuff injury a year ago after a similar type injury that required surgical intervention, is concerned she had similar injury to her right shoulder, she denies any history of any shoulder problems in the past    Past Medical History:   Diagnosis Date    Atypical ductal hyperplasia, breast     Breast cancer (Multi)     left    Breast cancer (Multi)     Hypertension     Hypogammaglobulinemia (Multi)     Necrotizing fasciitis (Multi)     right thigh 2016    Personal history of other diseases of the female genital tract     History of endometriosis    PONV (postoperative nausea and vomiting)     Unspecified benign mammary dysplasia of unspecified breast 11/11/2021    Atypical ductal hyperplasia of breast       Medication Documentation Review Audit       Reviewed by Ye Raphael MD (Physician) on 06/19/24 at 1224      Medication Order Taking? Sig Documenting Provider Last Dose Status   Bacillus subtilis-inulin 1.5 billion cell-1 gram tablet,chewable [id number] No Chew 1 tablet 2 times a day. Historical Provider, MD Taking Active   calcium carbonate/vitamin D3 (CALCIUM 600 WITH VITAMIN  D3 ORAL) [id number] No Take 2 tablets by mouth 2 times a day. Historical MD Ngoc Taking Active   doxycycline (Adoxa) 100 mg tablet [id number] No Take 1 tablet (100 mg) by mouth once daily. Take with a full glass of water and do not lie down for at least 30 minutes after Esperanza Grossman MD Taking Active   eszopiclone (Lunesta) 1 mg tablet [id number] No Take 1 tablet (1 mg) by mouth once daily at bedtime. Take immediately before bedtime Esperanza Grossman MD Taking Active   hydrALAZINE (Apresoline) 25 mg tablet [id number] No Take 1 tablet (25 mg) by mouth 3 times a day. Esperanza Grossman MD Taking Active   multivitamin with minerals tablet [id number] No Take 1 tablet by mouth once daily. Esperanza Grossman MD Taking Active   omega 3-dha-epa-fish oil (Fish OiL) 1,000 mg (120 mg-180 mg) capsule [id number] No Take 1 capsule (1,000 mg) by mouth 2 times a day. Esperanza Grossman MD Taking Active   sulfamethoxazole-trimethoprim (Bactrim DS) 800-160 mg tablet [id number] No Take 1 tablet by mouth once daily. Historical MD Ngoc Taking Active                    Allergies   Allergen Reactions    Clindamycin Nausea/vomiting     Oral - caused pancreatitis per pt    Dexilant [Dexlansoprazole] Nausea/vomiting     caused pancreatitis per pt    Flagyl [Metronidazole] Nausea/vomiting     caused pancreatitis per pt    Lisinopril Nausea/vomiting     caused pancreatitis per pt    Morphine Rash and Nausea/vomiting       Social History     Socioeconomic History    Marital status:      Spouse name: Not on file    Number of children: Not on file    Years of education: Not on file    Highest education level: Not on file   Occupational History    Not on file   Tobacco Use    Smoking status: Former     Current packs/day: 0.00     Types: Cigarettes     Start date: 3/1/1999     Quit date: 3/1/2019     Years since quittin.3     Passive exposure: Never    Smokeless tobacco: Never   Vaping Use    Vaping status: Never Used    Substance and Sexual Activity    Alcohol use: Never     Comment: 0    Drug use: Yes     Types: Marijuana     Comment: occas use of marijuana    Sexual activity: Not on file   Other Topics Concern    Not on file   Social History Narrative    Not on file     Social Determinants of Health     Financial Resource Strain: Not on file   Food Insecurity: Not on file   Transportation Needs: Not on file   Physical Activity: Not on file   Stress: Not on file   Social Connections: Not on file   Intimate Partner Violence: Not on file   Housing Stability: Not on file       Past Surgical History:   Procedure Laterality Date    APPENDECTOMY  05/29/2013    Appendectomy    BI MAMMO GUIDED BREAST RIGHT LOCALIZATION Right 01/21/2022    BI MAMMO GUIDED LOCALIZATION BREAST RIGHT 1/21/2022 [facility]    BREAST BIOPSY      ENDOMETRIAL ABLATION      OTHER SURGICAL HISTORY  08/17/2021    Tubal ligation    SHOULDER SURGERY      TUBAL LIGATION            Review of Systems   GENERAL: Negative  GI: Negative  MUSCULOSKELETAL: See HPI  SKIN: Negative  NEURO:  Negative     Physical Exam:    General/Constitutional: well appearing, no distress, appears stated age  HEENT: sclera clear  Respiratory: non labored breathing  Vascular: No edema, swelling or tenderness, except as noted in detailed exam.  Integumentary: No impressive skin lesions present, except as noted in detailed exam.  Neurological:  Alert and oriented   Psychological:  Normal mood and affect.  Musculoskeletal: Normal, except as noted in detailed exam and in HPI    Right shoulder: Normal appearance, no skin changes, no ecchymosis, glenohumeral joint is reduced.  There is some tenderness to palpation somewhat generalized at the shoulder region including the anterior lateral aspect of her shoulder.  She has limited active range of motion of only a few degrees with forward flexion and abduction, internal rotation to right side.  I can achieve near full passive forward flexion  with some guarding to 150 degrees.  Passive abduction to 120 degrees with pain and guarding.  She is a positive drop arm.  5 out of 5 strength with resisted internal rotation, 3 out of 5 with resisted external rotation and abduction.       Imaging: X-rays of right shoulder obtained today and independently reviewed, no acute abnormalities are seen, no fractures are identified, there is some mild degenerative changes at the acromioclavicular joint, on internal rotation view there is evidence of calcific tendinitis      Assessment   1. Acute pain of right shoulder  XR shoulder right 2+ views    MR shoulder right wo IV contrast    traMADol (Ultram) 50 mg tablet        Acute right shoulder pain with weakness limited active range of motion, concern for acute rotator cuff tear    Plan: Discussed differential diagnosis, further workup and treatment.  I would like to obtain an MRI of her right shoulder for evaluation of acute rotator cuff tear to help further guide treatment including potential need for surgical intervention.  Patient is limited in medications that she can take because of history of prior stomach ulcer.  I did provide her with a short course of tramadol to assist in pain control in the short-term.  Further treatment pending MRI results.

## 2024-07-09 ENCOUNTER — OFFICE VISIT (OUTPATIENT)
Dept: DERMATOLOGY | Facility: CLINIC | Age: 69
End: 2024-07-09
Payer: COMMERCIAL

## 2024-07-09 DIAGNOSIS — D22.5 MELANOCYTIC NEVI OF TRUNK: ICD-10-CM

## 2024-07-09 DIAGNOSIS — L82.1 SEBORRHEIC KERATOSIS: ICD-10-CM

## 2024-07-09 DIAGNOSIS — Z12.83 ENCOUNTER FOR SCREENING FOR MALIGNANT NEOPLASM OF SKIN: Primary | ICD-10-CM

## 2024-07-09 DIAGNOSIS — L73.2 HIDRADENITIS SUPPURATIVA: ICD-10-CM

## 2024-07-09 DIAGNOSIS — L81.4 LENTIGO: ICD-10-CM

## 2024-07-09 DIAGNOSIS — Z85.828 PERSONAL HISTORY OF OTHER MALIGNANT NEOPLASM OF SKIN: ICD-10-CM

## 2024-07-09 DIAGNOSIS — L57.8 PHOTOAGING OF SKIN: ICD-10-CM

## 2024-07-09 PROCEDURE — 99214 OFFICE O/P EST MOD 30 MIN: CPT | Performed by: DERMATOLOGY

## 2024-07-09 PROCEDURE — 1036F TOBACCO NON-USER: CPT | Performed by: DERMATOLOGY

## 2024-07-09 PROCEDURE — 3008F BODY MASS INDEX DOCD: CPT | Performed by: DERMATOLOGY

## 2024-07-09 PROCEDURE — 1159F MED LIST DOCD IN RCRD: CPT | Performed by: DERMATOLOGY

## 2024-07-09 RX ORDER — CLINDAMYCIN PHOSPHATE 10 UG/ML
LOTION TOPICAL
Qty: 60 ML | Refills: 11 | Status: SHIPPED | OUTPATIENT
Start: 2024-07-09

## 2024-07-09 ASSESSMENT — DERMATOLOGY PATIENT ASSESSMENT
DO YOU USE A TANNING BED: NO
DO YOU USE SUNSCREEN: DAILY
ARE YOU TRYING TO GET PREGNANT: NO
ARE YOU ON BIRTH CONTROL: NO
DO YOU HAVE ANY NEW OR CHANGING LESIONS: YES
FOR PATIENTS COMING IN FOR A FOLLOW-UP VISIT - HAVE THERE BEEN ANY CHANGES IN YOUR HEALTH SINCE YOUR LAST VISIT: BREAST CANCER
WHERE ARE THESE NEW OR CHANGING LESIONS LOCATED: LEFT BREAST
ARE YOU AN ORGAN TRANSPLANT RECIPIENT: NO

## 2024-07-09 ASSESSMENT — DERMATOLOGY QUALITY OF LIFE (QOL) ASSESSMENT
ARE THERE EXCLUSIONS OR EXCEPTIONS FOR THE QUALITY OF LIFE ASSESSMENT: NO
WHAT SINGLE SKIN CONDITION LISTED BELOW IS THE PATIENT ANSWERING THE QUALITY-OF-LIFE ASSESSMENT QUESTIONS ABOUT: HIDRADENITIS SUPPURATIVA
RATE HOW BOTHERED YOU ARE BY EFFECTS OF YOUR SKIN PROBLEMS ON YOUR ACTIVITIES (EG, GOING OUT, ACCOMPLISHING WHAT YOU WANT, WORK ACTIVITIES OR YOUR RELATIONSHIPS WITH OTHERS): 2
RATE HOW EMOTIONALLY BOTHERED YOU ARE BY YOUR SKIN PROBLEM (FOR EXAMPLE, WORRY, EMBARRASSMENT, FRUSTRATION): 2
RATE HOW BOTHERED YOU ARE BY SYMPTOMS OF YOUR SKIN PROBLEM (EG, ITCHING, STINGING BURNING, HURTING OR SKIN IRRITATION): 2

## 2024-07-09 ASSESSMENT — PATIENT GLOBAL ASSESSMENT (PGA): PATIENT GLOBAL ASSESSMENT: PATIENT GLOBAL ASSESSMENT:  2 - MILD

## 2024-07-09 ASSESSMENT — ITCH NUMERIC RATING SCALE: HOW SEVERE IS YOUR ITCHING?: 0

## 2024-07-09 NOTE — PROGRESS NOTES
Subjective     Bianca Cornell is a 69 y.o. female who presents for the following: Skin Check (Pt here for FBSE. No hx of skin cancer. Oncologist wanted lesion checked left breast checked.) and Hidradenitis Suppurativa (Pt here following up on Hidradenitis Suppurativa. Current tx Clindamycin 1% lotion. Pt uses Hibaclens 2 x a week all over, and Neutogena deep pore cleanser other days.  ).   History of breast cancer - left breast -- Pathologic: Stage IA (pT1a, pN0(sn), cM0, G2, ER+, TX+, HER2-) 2024 s/p radiation completed 4/2024    Review of Systems:  No other skin or systemic complaints other than what is documented elsewhere in the note.    The following portions of the chart were reviewed this encounter and updated as appropriate:         Skin Cancer History  History of SCC - right alar creas > 10 years ago      Specialty Problems          Dermatology Problems    Follicular disorder, unspecified    Melanocytic nevi of trunk    Personal history of other malignant neoplasm of skin    Other seborrheic keratosis    Hidradenitis suppurativa    Other benign neoplasm of skin, unspecified    Other melanin hyperpigmentation    Contusion        Objective   Well appearing patient in no apparent distress; mood and affect are within normal limits.    A full examination was performed including scalp, head, eyes, ears, nose, lips, neck, chest, axillae, abdomen, back, buttocks, bilateral upper extremities, bilateral lower extremities, hands, feet, fingers, toes, fingernails, and toenails. All findings within normal limits unless otherwise noted below.    Assessment/Plan   1. Encounter for screening for malignant neoplasm of skin  No suspicious lesions noted on examination today    The risk of chronic, cumulative sun damage and risk of development of skin cancer was reviewed today.   The importance of sun protection was reviewed: including the use of a broad spectrum sunscreen of at least SPF 30 that protects against both  UVA/UVB rays, with ingredients such as Zinc oxide or titanium dioxide, wearing sun protective clothing and sun avoidance. We reviewed the warning signs of non-melanoma skin cancer and ABCDEs of melanoma  Please follow up should you notice any new or changing pre-existing skin lesion.    Related Procedures  Follow Up In Dermatology - Established Patient    2. Hidradenitis suppurativa  Left Inframammary Fold, Right Inframammary Fold  Clear today    Hidradenitis suppurativa is a chronic and intermittently flaring condition that can occur in multiple areas including the axilla, beneath the breasts, groin and/or buttock.  There is no cure for this condition, but it can be controlled.  The goal of therapy is to limit the severity and frequency of outbreaks.  Treatments typically include a combination of topical medications with or without oral medications.     Continue hibiclens daily, clindamycin 1% lotion 2x daily and benzoyl peroxide OTC cleanser.    Related Procedures  Follow Up In Dermatology - Established Patient    Related Medications  clindamycin (Cleocin T) 1 % lotion  Apply 1-2x daily    3. Seborrheic keratosis (2)  Generalized, Left Breast  Brown, tan waxy macules and stuck on appearing papules and plaques    The benign nature of these skin lesions reviewed, reassure provided and no further treatment needed at this time.   These lesions can be removed, if symptomatic (itching, bleeding, rubbing on clothing, painful), otherwise removal is considered cosmetic.     4. Photoaging of skin  Mottled pigmentation with telangiectasias and brown reticular macules in sun exposed areas of the body.    The risk of chronic, cumulative sun damage and risk of development of skin cancer was reviewed today.   The importance of sun protection was reviewed: including the use of a broad spectrum sunscreen of at least SPF 30 that protects against both UVA/UVB rays, with ingredients such as Zinc oxide or titanium dioxide, wearing sun  protective clothing and sun avoidance. We reviewed the warning signs of non-melanoma skin cancer and ABCDEs of melanoma  Please follow up should you notice any new or changing pre-existing skin lesion.    5. Lentigo (2)  Generalized, Left Parotid Area  Scattered tan macules in sun-exposed areas.    These are benign skin lesions due to sun exposure. They will darken in response to sun exposure. They should be monitored for change in size, shape or color.  These lesions can be treated cosmetically with topical creams, liquid nitrogen and a variety of lasers.    6. Melanocytic nevi of trunk (2)  Left Abdomen (side) - Upper, Right Breast  Tan-brown symmetric macules and papules    Clinically benign appearing nevi, no treatment is necessary.  The importance of sun protection was reviewed: including the use of a broad spectrum sunscreen that protects against both UVA/UVB rays, with ingredients such as Zinc oxide or titanium dioxide, wearing sun protective clothing and sun avoidance.   ABCDEs of melanoma reviewed.  Please follow up should you notice any new or changing pre-existing skin lesion.    7. Personal history of other malignant neoplasm of skin  Right Nasal Sidewall  Well healed scar at site of prior treatment without evidence of recurrence.    There is no evidence of recurrence on clinical examination today, reassurance was provided to the patient. The importance of sun protection was reviewed with the patient including the use of a broad spectrum sunscreen that protects against both UVA/UVB rays, with ingredients such as Zinc oxide or titanium dioxide, wearing sun protective clothing and sun avoidance. Warning signs of non-melanoma skin cancer were reviewed. ABCDEs of melanoma reviewed. Patient to f/u should they notice any new or changing pre-existing skin lesion      Follow up in 1 year for FBSE.

## 2024-07-11 ENCOUNTER — HOSPITAL ENCOUNTER (OUTPATIENT)
Dept: RADIOLOGY | Facility: CLINIC | Age: 69
Discharge: HOME | End: 2024-07-11
Payer: COMMERCIAL

## 2024-07-11 DIAGNOSIS — M25.511 ACUTE PAIN OF RIGHT SHOULDER: ICD-10-CM

## 2024-07-11 PROCEDURE — 73221 MRI JOINT UPR EXTREM W/O DYE: CPT | Mod: RIGHT SIDE | Performed by: STUDENT IN AN ORGANIZED HEALTH CARE EDUCATION/TRAINING PROGRAM

## 2024-07-11 PROCEDURE — 73221 MRI JOINT UPR EXTREM W/O DYE: CPT | Mod: RT

## 2024-07-17 ENCOUNTER — APPOINTMENT (OUTPATIENT)
Dept: ORTHOPEDIC SURGERY | Facility: CLINIC | Age: 69
End: 2024-07-17
Payer: COMMERCIAL

## 2024-07-17 DIAGNOSIS — S46.011D TRAUMATIC INCOMPLETE TEAR OF RIGHT ROTATOR CUFF, SUBSEQUENT ENCOUNTER: Primary | ICD-10-CM

## 2024-07-17 DIAGNOSIS — M25.511 ACUTE PAIN OF RIGHT SHOULDER: ICD-10-CM

## 2024-07-17 PROCEDURE — 99214 OFFICE O/P EST MOD 30 MIN: CPT | Performed by: FAMILY MEDICINE

## 2024-07-17 PROCEDURE — 1159F MED LIST DOCD IN RCRD: CPT | Performed by: FAMILY MEDICINE

## 2024-07-17 PROCEDURE — 20610 DRAIN/INJ JOINT/BURSA W/O US: CPT | Performed by: FAMILY MEDICINE

## 2024-07-17 PROCEDURE — 1036F TOBACCO NON-USER: CPT | Performed by: FAMILY MEDICINE

## 2024-07-17 PROCEDURE — 1160F RVW MEDS BY RX/DR IN RCRD: CPT | Performed by: FAMILY MEDICINE

## 2024-07-17 RX ORDER — LIDOCAINE HYDROCHLORIDE 20 MG/ML
2 INJECTION, SOLUTION INFILTRATION; PERINEURAL
Status: COMPLETED | OUTPATIENT
Start: 2024-07-17 | End: 2024-07-17

## 2024-07-17 RX ORDER — TRIAMCINOLONE ACETONIDE 40 MG/ML
40 INJECTION, SUSPENSION INTRA-ARTICULAR; INTRAMUSCULAR
Status: COMPLETED | OUTPATIENT
Start: 2024-07-17 | End: 2024-07-17

## 2024-07-17 NOTE — PROGRESS NOTES
History of Present Illness   Chief Complaint   Patient presents with    Right Shoulder - Follow-up     DISCUSS MRI RESULTS       The patient is 69 y.o. right-hand-dominant female  here for follow-up of right shoulder pain/MRI review.  Patient was initially seen by me approximately 4 weeks ago in our orthopedic walk-in clinic for acute right shoulder pain, see previous note for full details of history.  In brief patient tripped over a stool in her kitchen, grabbed the counter to keep her from falling to the ground with acute onset of pain.  She had very limited range of motion at her visit with me, there is notable weakness, there was concern for acute rotator cuff tear, x-rays were negative for fracture, has history of prior left shoulder rotator cuff tear that did require surgery.  MRI was ordered which she had done recently and is here today for review.  She does admit to better range of motion in her shoulder but is still having some pretty notable discomfort with any attempted overhead activity, laying on her right shoulder, says she has been intentionally limiting use of her right upper extremity to avoid any worsening symptoms.  She has been taking over-the-counter medications for pain control.  She denies any new radiation of pain, she says pain is over the anterior shoulder, she admits to some radiation of pain into her chest wall at times.      Past Medical History:   Diagnosis Date    Atypical ductal hyperplasia, breast     Breast cancer (Multi)     left    Breast cancer (Multi)     Hypertension     Hypogammaglobulinemia (Multi)     Necrotizing fasciitis (Multi)     right thigh 2016    Personal history of other diseases of the female genital tract     History of endometriosis    PONV (postoperative nausea and vomiting)     Unspecified benign mammary dysplasia of unspecified breast 11/11/2021    Atypical ductal hyperplasia of breast       Medication Documentation Review Audit       Reviewed by Kelly SLADE  ZAID Marin (Licensed Nurse) on 07/09/24 at 0944      Medication Order Taking? Sig Documenting Provider Last Dose Status   Bacillus subtilis-inulin 1.5 billion cell-1 gram tablet,chewable [id number] No Chew 1 tablet 2 times a day. Historical MD Ngoc Taking Active   calcium carbonate/vitamin D3 (CALCIUM 600 WITH VITAMIN D3 ORAL) [id number] No Take 2 tablets by mouth 2 times a day. Esperanza Grossman MD Taking Active   doxycycline (Adoxa) 100 mg tablet [id number] No Take 1 tablet (100 mg) by mouth once daily. Take with a full glass of water and do not lie down for at least 30 minutes after Historical MD Ngoc Taking Active   eszopiclone (Lunesta) 1 mg tablet [id number] No Take 1 tablet (1 mg) by mouth once daily at bedtime. Take immediately before bedtime Historical MD Ngoc Taking Active   hydrALAZINE (Apresoline) 25 mg tablet [id number] No Take 1 tablet (25 mg) by mouth 3 times a day. Historical Provider, MD Taking Active   multivitamin with minerals tablet [id number] No Take 1 tablet by mouth once daily. Historical Provider, MD Taking Active   omega 3-dha-epa-fish oil (Fish OiL) 1,000 mg (120 mg-180 mg) capsule [id number] No Take 1 capsule (1,000 mg) by mouth 2 times a day. Historical MD Ngoc Taking Active   sulfamethoxazole-trimethoprim (Bactrim DS) 800-160 mg tablet [id number] No Take 1 tablet by mouth once daily. Historical Provider, MD Taking Active                    Allergies   Allergen Reactions    Clindamycin Nausea/vomiting     Oral - caused pancreatitis per pt    Dexilant [Dexlansoprazole] Nausea/vomiting     caused pancreatitis per pt    Flagyl [Metronidazole] Nausea/vomiting     caused pancreatitis per pt    Lisinopril Nausea/vomiting     caused pancreatitis per pt    Morphine Rash and Nausea/vomiting       Social History     Socioeconomic History    Marital status:      Spouse name: Not on file    Number of children: Not on file    Years of education: Not on file    Highest education  level: Not on file   Occupational History    Not on file   Tobacco Use    Smoking status: Former     Current packs/day: 0.00     Types: Cigarettes     Start date: 3/1/1999     Quit date: 3/1/2019     Years since quittin.3     Passive exposure: Never    Smokeless tobacco: Never   Vaping Use    Vaping status: Never Used   Substance and Sexual Activity    Alcohol use: Never     Comment: 0    Drug use: Yes     Types: Marijuana     Comment: occas use of marijuana    Sexual activity: Not on file   Other Topics Concern    Not on file   Social History Narrative    Not on file     Social Determinants of Health     Financial Resource Strain: Not on file   Food Insecurity: Not on file   Transportation Needs: Not on file   Physical Activity: Not on file   Stress: Not on file   Social Connections: Not on file   Intimate Partner Violence: Not on file   Housing Stability: Not on file       Past Surgical History:   Procedure Laterality Date    APPENDECTOMY  2013    Appendectomy    BI MAMMO GUIDED BREAST RIGHT LOCALIZATION Right 2022    BI MAMMO GUIDED LOCALIZATION BREAST RIGHT 2022 [facility]    BREAST BIOPSY      ENDOMETRIAL ABLATION      OTHER SURGICAL HISTORY  2021    Tubal ligation    SHOULDER SURGERY      TUBAL LIGATION            Review of Systems   GENERAL: Negative  GI: Negative  MUSCULOSKELETAL: See HPI  SKIN: Negative  NEURO:  Negative     Physical Exam:    General/Constitutional: well appearing, no distress, appears stated age  HEENT: sclera clear  Respiratory: non labored breathing  Vascular: No edema, swelling or tenderness, except as noted in detailed exam.  Integumentary: No impressive skin lesions present, except as noted in detailed exam.  Neurological:  Alert and oriented   Psychological:  Normal mood and affect.  Musculoskeletal: Normal, except as noted in detailed exam and in HPI    Right shoulder: Normal appearance, no skin changes, no ecchymosis, glenohumeral joint is  reduced.  She does have tenderness at the anterior glenohumeral joint, there is tenderness at the subacromial space/supraspinatus insertion at the greater tuberosity.  Range of motion is improved significantly, forward flexion and abduction to 140 degrees improved from 10 to 15 degrees at initial visit, internal rotation to lumbar spine.  No passive range of motion deficits are present, there is some discomfort endrange of forward flexion and abduction.  5 out of 5 strength with resisted internal rotation, 4 out of 5 with resisted external rotation and abduction.  Negative drop arm       Imaging: MRI of right shoulder including images was reviewed with patient, there is a partial-thickness bursal sided tear of the insertional aspect of the supraspinatus tendon, there is some tendinosis of the supraspinatus and infraspinatus, there is some degenerative changes which are mild in the acromioclavicular glenohumeral joint, there is some tendinopathy of the proximal biceps tendon.  There is signal abnormality within the supraspinatus muscle belly thought to reflect AVM/slow flow vascular region per radiology    L Inj/Asp: R subacromial bursa on 7/17/2024 10:46 AM  Indications: pain  Details: 22 G needle, posterior approach  Medications: 40 mg triamcinolone acetonide 40 mg/mL; 2 mL lidocaine 20 mg/mL (2 %)  Outcome: tolerated well, no immediate complications  Procedure, treatment alternatives, risks and benefits explained, specific risks discussed. Consent was given by the patient. Immediately prior to procedure a time out was called to verify the correct patient, procedure, equipment, support staff and site/side marked as required. Patient was prepped and draped in the usual sterile fashion.               Assessment   1. Traumatic incomplete tear of right rotator cuff, subsequent encounter  Referral to Physical Therapy        Partial-thickness supraspinatus tear, 1 month out from injury, good improvement in range of  motion, strength testing on exam today compared to initial visit, still having some pain    Plan:  discussed further workup and treatment, MRI images were reviewed.  Patient is hopeful to avoid surgical intervention unless absolutely necessary, based on MRI findings, improvement in clinical exam I think is reasonable to continue with conservative treatment.  We did proceed with subacromial injection with Kenalog today, referral to physical therapy was placed, she plans to do it outside PT facility in [place].  I will like to see her back in 2 months for reassessment, sooner if needed

## 2024-08-08 ENCOUNTER — TELEPHONE (OUTPATIENT)
Dept: HEMATOLOGY/ONCOLOGY | Facility: CLINIC | Age: 69
End: 2024-08-08

## 2024-08-08 ENCOUNTER — LAB (OUTPATIENT)
Dept: LAB | Facility: LAB | Age: 69
End: 2024-08-08
Payer: COMMERCIAL

## 2024-08-08 DIAGNOSIS — D80.1 HYPOGAMMAGLOBULINEMIA (MULTI): Primary | ICD-10-CM

## 2024-08-08 DIAGNOSIS — D80.1 HYPOGAMMAGLOBULINEMIA (MULTI): ICD-10-CM

## 2024-08-08 PROCEDURE — 36415 COLL VENOUS BLD VENIPUNCTURE: CPT

## 2024-08-08 PROCEDURE — 86317 IMMUNOASSAY INFECTIOUS AGENT: CPT

## 2024-08-08 NOTE — TELEPHONE ENCOUNTER
Called patient in regard to power outage at [facility] r/t to her appointmenton Tuesday, per Myriam Ramirez okay for pt to be a phone visit if agreeable pt expresses that she has been feeling good and is excited for her trip next week, educated to reach out for any issues pt verbalizes understanding

## 2024-08-11 LAB
S PN DA SERO 19F IGG SER-MCNC: 2.36 UG/ML
S PNEUM DA 1 IGG SER-MCNC: 0.17 UG/ML
S PNEUM DA 10A IGG SER-MCNC: 0.33 UG/ML
S PNEUM DA 11A IGG SER-MCNC: 0.13 UG/ML
S PNEUM DA 12F IGG SER-MCNC: 0.06 UG/ML
S PNEUM DA 14 IGG SER-MCNC: 1.55 UG/ML
S PNEUM DA 15B IGG SER-MCNC: 0.53 UG/ML
S PNEUM DA 17F IGG SER-MCNC: 0.49 UG/ML
S PNEUM DA 18C IGG SER-MCNC: 0.71 UG/ML
S PNEUM DA 19A IGG SER-MCNC: 1.2 UG/ML
S PNEUM DA 2 IGG SER-MCNC: 0.86 UG/ML
S PNEUM DA 20A IGG SER-MCNC: 0.15 UG/ML
S PNEUM DA 22F IGG SER-MCNC: <0.03 UG/ML
S PNEUM DA 23F IGG SER-MCNC: 0.3 UG/ML
S PNEUM DA 3 IGG SER-MCNC: 0.66 UG/ML
S PNEUM DA 33F IGG SER-MCNC: 0.73 UG/ML
S PNEUM DA 4 IGG SER-MCNC: 0.45 UG/ML
S PNEUM DA 5 IGG SER-MCNC: 0.04 UG/ML
S PNEUM DA 6B IGG SER-MCNC: 0.23 UG/ML
S PNEUM DA 7F IGG SER-MCNC: 0.63 UG/ML
S PNEUM DA 8 IGG SER-MCNC: <0.03 UG/ML
S PNEUM DA 9N IGG SER-MCNC: 0.63 UG/ML
S PNEUM DA 9V IGG SER-MCNC: 0.64 UG/ML
S PNEUM SEROTYPE IGG SER-IMP: NORMAL

## 2024-08-12 NOTE — PROGRESS NOTES
Patient ID: Bianca Cornell is a 69 y.o. female.    The patient presents to clinic today for her history of breast cancer.     Cancer Staging   Malignant neoplasm of central portion of left breast in female, estrogen receptor positive (Multi)  Staging form: Breast, AJCC 8th Edition  - Pathologic: Stage IA (pT1a, pN0(sn), cM0, G2, ER+, MS+, HER2-) - Signed by Constance Sweeney DO on 3/22/2024    Diagnostic/Therapeutic History:    - 2022: excisional biopsy by Dr Kendrick for right atypical ductal hyperplasia    - On : Diagnostic imaging with bl Diagnostic mammogram and US showed a 0.4 cm mass at 9 oclock 5 CFN, 3 morphologically normal axillary LN    - On 2024:  Left breast stereotactic guided core needle biopsy showed IDC G2 ER: 95%, MS: 95%, HER2 negative (0)    - On 2024: Dr Sweeney left PM with SLNB (0/2)  showed a 4mm focus G2 final stage [id number] ER: 95%, MS: 95%, HER2 negative     - On 2024: completed radiation therapy     History of Present Illness (HPI)/Interval History:  Doing well - walks 2 miles a day     She denies any chest pain or breathing issues.     She denies any new or unexplained bone aches or pains.    She reports a normal appetite. Her weight is stable.     She does note some fatigue since xrt but will rest as needed.     PMH: Hypogammaglobunemia, has recurrent infections on suppressive antibiotics,    PSH: left shoulder surgery, hx of Necrotizing fascitis in 2019 s/p debridement    Allergies & Meds: reviewed    Reproductive hx: Menarche at age 15, , AFLB: 18.  no OCP, no HRT, menopause at 51    Family history: negative     Review of Systems:  14-point ROS otherwise negative, as per HPI.    Past Medical History:   Diagnosis Date    Atypical ductal hyperplasia, breast     Breast cancer (Multi)     left    Breast cancer (Multi)     Hypertension     Hypogammaglobulinemia (Multi)     Necrotizing fasciitis (Multi)     right thigh     Personal history of other  diseases of the female genital tract     History of endometriosis    PONV (postoperative nausea and vomiting)     Unspecified benign mammary dysplasia of unspecified breast 2021    Atypical ductal hyperplasia of breast       Past Surgical History:   Procedure Laterality Date    APPENDECTOMY  2013    Appendectomy    BI MAMMO GUIDED BREAST RIGHT LOCALIZATION Right 2022    BI MAMMO GUIDED LOCALIZATION BREAST RIGHT 2022 [facility]    BREAST BIOPSY      ENDOMETRIAL ABLATION      OTHER SURGICAL HISTORY  2021    Tubal ligation    SHOULDER SURGERY      TUBAL LIGATION         Social History     Socioeconomic History    Marital status:    Tobacco Use    Smoking status: Former     Current packs/day: 0.00     Types: Cigarettes     Start date: 3/1/1999     Quit date: 3/1/2019     Years since quittin.4     Passive exposure: Never    Smokeless tobacco: Never   Vaping Use    Vaping status: Never Used   Substance and Sexual Activity    Alcohol use: Never     Comment: 0    Drug use: Yes     Types: Marijuana     Comment: occas use of marijuana       Allergies   Allergen Reactions    Clindamycin Nausea/vomiting     Oral - caused pancreatitis per pt    Dexilant [Dexlansoprazole] Nausea/vomiting     caused pancreatitis per pt    Flagyl [Metronidazole] Nausea/vomiting     caused pancreatitis per pt    Lisinopril Nausea/vomiting     caused pancreatitis per pt    Morphine Rash and Nausea/vomiting         Current Outpatient Medications:     Bacillus subtilis-inulin 1.5 billion cell-1 gram tablet,chewable, Chew 1 tablet 2 times a day., Disp: , Rfl:     calcium carbonate/vitamin D3 (CALCIUM 600 WITH VITAMIN D3 ORAL), Take 2 tablets by mouth 2 times a day., Disp: , Rfl:     clindamycin (Cleocin T) 1 % lotion, Apply 1-2x daily, Disp: 60 mL, Rfl: 11    doxycycline (Adoxa) 100 mg tablet, Take 1 tablet (100 mg) by mouth once daily. Take with a full glass of water and do not lie down for at least 30  minutes after, Disp: , Rfl:     eszopiclone (Lunesta) 1 mg tablet, Take 1 tablet (1 mg) by mouth once daily at bedtime. Take immediately before bedtime, Disp: , Rfl:     hydrALAZINE (Apresoline) 25 mg tablet, Take 1 tablet (25 mg) by mouth 3 times a day., Disp: , Rfl:     multivitamin with minerals tablet, Take 1 tablet by mouth once daily., Disp: , Rfl:     omega 3-dha-epa-fish oil (Fish OiL) 1,000 mg (120 mg-180 mg) capsule, Take 1 capsule (1,000 mg) by mouth 2 times a day., Disp: , Rfl:     sulfamethoxazole-trimethoprim (Bactrim DS) 800-160 mg tablet, Take 1 tablet by mouth once daily., Disp: , Rfl:      Objective    BSA: There is no height or weight on file to calculate BSA.  LMP  (LMP Unknown)     The ECOG performance scale today is ECO- Fully active, able to carry on all pre-disease performance w/o restriction.     Limited PE due to telephone interface.   Speaking in full sentence without evidence of SOB.   A&O x 4.     Laboratory Data:  Lab Results   Component Value Date    WBC 11.7 (H) 2024    HGB 14.4 2024    HCT 42.7 2024    MCV 85 2024     2024       Chemistry    Lab Results   Component Value Date/Time     2024 1519    K 3.8 2024 1519     2024 1519    CO2 25 2024 1519    BUN 14 2024 1519    CREATININE 0.74 2024 1519    Lab Results   Component Value Date/Time    CALCIUM 10.2 2024 1519    ALKPHOS 71 2024 0952    AST 15 2024 0952    ALT 18 2024 0952    BILITOT 0.4 2024 0952             Radiology:  MR shoulder right wo IV contrast  Narrative: Interpreted By:  Chang Nichols,   STUDY:  MRI of the  right shoulder without IV contrast;  2024 10:04 am      INDICATION:  Signs/Symptoms:SHOULDER PAIN.      COMPARISON:  2024      ACCESSION NUMBER(S):  [id number]      ORDERING CLINICIAN:  DIANA OLIVARES      TECHNIQUE:  MR imaging of the  right shoulder was obtained  without IV contrast.       FINDINGS:  ROTATOR CUFF TENDONS:  There is mild supraspinatus and infraspinatus tendinosis. Limited  evaluation of the far anterior insertional supraspinatus tendon due  to marked internal rotation. Within this limitation, there is  heterogeneity and abnormal increased signal of the supraspinatus  tendon anteriorly which is favored to reflect high-grade,  partial-thickness, bursal sided tearing which measures approximately  8 mm in AP dimension. The infraspinatus tendon demonstrates mild  tendinosis without tear. There is globular focus of mineralization  involving the posterior insertional infraspinatus tendon consistent  with hydroxyapatite deposition. The teres minor tendon is intact.  There is low-grade partial thickness superior subscapularis articular  surface tearing. There is no muscle atrophy or edema. However, there  is a multiloculated T2 hyperintense structure within the  supraspinatus muscle belly which is relatively infiltrative in  appearance and measures approximately 2.3 x 2.4 x 3.6 cm. There  interdigitating fat signal intensity. This is favored to reflect a  slow flow vascular lesion.      BICEPS TENDON AND ROTATOR INTERVAL:  Mild intra-articular long head biceps tendinosis without tear.          JOINTS:  Moderate acromioclavicular degenerative change.      Mild diffuse glenohumeral articular cartilage thinning.      Trace subacromial subdeltoid bursal fluid. No joint effusion.      LABRUM:  Circumferential labral truncation and irregularity.      OSSEOUS STRUCTURES:  No focal marrow replacing lesions are identified. There is no  fracture.      SOFT TISSUES:  The suprascapular nerve is intact at the suprascapular and  spinoglenoid notches.      Impression: Mild supraspinatus and infraspinatus tendinosis with suggestion of  high-grade, partial-thickness, bursal surface tearing of the far  anterior insertional supraspinatus tendon. This is difficult to fully  evaluate given internal rotation. Of  note, there is signal  abnormality within the supraspinatus muscle belly which is favored to  reflect a slow flow vascular lesion, as detailed above.      Mild infraspinatus tendinosis with findings which may reflect  calcific tendinitis in the appropriate clinical scenario.      Moderate acromioclavicular and mild glenohumeral degenerative change.      Mild intra-articular long head biceps tendinosis.      Additional findings, as above.      MACRO:  None      Signed by: Chang Nichols 7/11/2024 10:23 AM  Dictation workstation:   [id number]       BI mammo left diagnostic tomosynthesis 01/11/2024  BI US breast limited left 01/11/2024    Narrative  Interpreted By:  Salima Mendoza and Avery Ross  STUDY:  BI MAMMO LEFT DIAGNOSTIC TOMOSYNTHESIS; BI US BREAST LIMITED LEFT;  1/11/2024 11:59 am; 1/11/2024 12:32 pm    ACCESSION NUMBER(S):  [id number]; [id number]    ORDERING CLINICIAN:  ILIANA BEE    INDICATION:  The patient was recalled from recent screening mammogram 01/11/2024  for a left breast mass.    COMPARISON:  01/11/2024, 08/30/2022, 01/21/2022.    FINDINGS:  MAMMOGRAPHY: 2D and tomosynthesis images were reviewed at 1 mm slice  thickness.    Density:  The breast tissue is heterogeneously dense, which may  obscure small masses.    Spot compression views demonstrate an irregular spiculated equal  density mass in the central medial left breast at anterior depth.    ULTRASOUND:  A targeted ultrasound of the left breast and axilla was  performed using elastography.    An irregular not parallel hypoechoic mass with angular margins and  posterior shadowing is seen at 9 o'clock 5 cm from the nipple. The  mass measures 0.3 x 0.2 x 0.4 cm. It is avascular and soft on  elastography. This corresponds with the mammographic left breast mass.    A targeted ultrasound of the left axilla demonstrates 3  morphologically normal axillary lymph nodes.    Impression  Suspicious left breast mass without axillary lymphadenopathy.  Further  evaluation with surgical consultation and ultrasound-guided biopsy is  recommended. The patient is scheduled to see Lora Smith in  clinic today to discuss the findings and recommendations. A message  was sent to the referring practitioner at the time of this dictation  regarding these critical findings using the Epic notification system.  A pre-procedure form was filled out.    Method of Detection: Category Sdbt - 3D Screening    BI-RADS CATEGORY:    BI-RADS Category:  4 Suspicious.  Recommendation:  Biopsy.  Recommended Date:  Immediate.  Laterality:  Left.    For any future breast imaging appointments, please call [phone]  ([phone]).    I personally reviewed the images/study and I agree with the findings  as stated by fellow physician, Dr. Donnell Mcgraw.      MACRO:  Critical Finding:  Breast Imaging Abnormality. Notification was  initiated on 1/11/2024 at 12:35 pm by  Donnell Mcgraw.  (**-YCF-**)  Instructions:  See Impression for specific recommendations.    Signed by: Salima Mendoza 1/11/2024 1:00 PM  Dictation workstation:   [id number]          Assessment/Plan:    69 year old female patient with hx of hypogammaglobunemia, has has mutiple infections in the past including Nec Fascitis s/p multiple surgeries    - 01/22/2022: excisional biopsy by Dr Kendrick for right atypical ductal hyperplasia    - On 01/11/204: Diagnostic imaging with bl Diagnostic mammogram and US showed a 0.4 cm mass at 9 oclock 5 CFN, 3 morphologically normal axillary LN    - On 01/12/2024:  Left breast stereotactic guided core needle biopsy showed IDC G2 ER: 95%, HI: 95%, HER2 negative (0)    - On 03/01/2024: Dr Sweeney left PM with SLNB (0/2)  showed a 4mm focus G2 final stage [id number] ER: 95%, HI: 95%, HER2 negative     I reviewed with her the events that led to her diagnosis of breast cancer. We reviewed all the procedures and diagnostic imaging she underwent thus far. I discussed the features of her breast cancer that include  the size, grade, lymph node status and hormone receptor/ her2-caryn status.     Completed radiation therapy- deferring hormonal therapy because she was concerned  of side effects. On active surveillance    - Mammogram in Jan 2025 with surgery follow up   - RTC in  6 months with Myriam Ramirez     There is no evidence of breast cancer recurrence based on her clinical exam today.     This visit was conducted via telephone telecommunication due to COVID-19 which permits real time communication between patient and provider.     Myriam Ramirez PA-C  Hematology and Medical Oncology  [facility]

## 2024-08-13 ENCOUNTER — TELEMEDICINE (OUTPATIENT)
Dept: HEMATOLOGY/ONCOLOGY | Facility: CLINIC | Age: 69
End: 2024-08-13
Payer: COMMERCIAL

## 2024-08-13 DIAGNOSIS — Z17.0 MALIGNANT NEOPLASM OF CENTRAL PORTION OF LEFT BREAST IN FEMALE, ESTROGEN RECEPTOR POSITIVE (MULTI): ICD-10-CM

## 2024-08-13 DIAGNOSIS — C50.112 MALIGNANT NEOPLASM OF CENTRAL PORTION OF LEFT BREAST IN FEMALE, ESTROGEN RECEPTOR POSITIVE (MULTI): ICD-10-CM

## 2024-08-13 PROCEDURE — 99213 OFFICE O/P EST LOW 20 MIN: CPT

## 2024-09-09 ENCOUNTER — TELEPHONE (OUTPATIENT)
Dept: DERMATOLOGY | Facility: CLINIC | Age: 69
End: 2024-09-09
Payer: COMMERCIAL

## 2024-09-13 DIAGNOSIS — B99.9 RECURRENT INFECTIONS: Primary | ICD-10-CM

## 2024-09-17 ENCOUNTER — LAB (OUTPATIENT)
Dept: LAB | Facility: LAB | Age: 69
End: 2024-09-17
Payer: COMMERCIAL

## 2024-09-17 DIAGNOSIS — B99.9 RECURRENT INFECTIONS: ICD-10-CM

## 2024-09-17 PROCEDURE — 86317 IMMUNOASSAY INFECTIOUS AGENT: CPT

## 2024-09-17 PROCEDURE — 36415 COLL VENOUS BLD VENIPUNCTURE: CPT

## 2024-09-18 ENCOUNTER — APPOINTMENT (OUTPATIENT)
Dept: ORTHOPEDIC SURGERY | Facility: CLINIC | Age: 69
End: 2024-09-18
Payer: COMMERCIAL

## 2024-09-19 LAB
S PN DA SERO 19F IGG SER-MCNC: 27.79 UG/ML
S PNEUM DA 1 IGG SER-MCNC: 5.66 UG/ML
S PNEUM DA 10A IGG SER-MCNC: 3.29 UG/ML
S PNEUM DA 11A IGG SER-MCNC: 1.72 UG/ML
S PNEUM DA 12F IGG SER-MCNC: 0.95 UG/ML
S PNEUM DA 14 IGG SER-MCNC: 14.75 UG/ML
S PNEUM DA 15B IGG SER-MCNC: 7.01 UG/ML
S PNEUM DA 17F IGG SER-MCNC: 3.77 UG/ML
S PNEUM DA 18C IGG SER-MCNC: 9.01 UG/ML
S PNEUM DA 19A IGG SER-MCNC: 11.17 UG/ML
S PNEUM DA 2 IGG SER-MCNC: 7.42 UG/ML
S PNEUM DA 20A IGG SER-MCNC: 1.2 UG/ML
S PNEUM DA 22F IGG SER-MCNC: 0.75 UG/ML
S PNEUM DA 23F IGG SER-MCNC: 6.17 UG/ML
S PNEUM DA 3 IGG SER-MCNC: 5.54 UG/ML
S PNEUM DA 33F IGG SER-MCNC: >13.39 UG/ML
S PNEUM DA 4 IGG SER-MCNC: 3.74 UG/ML
S PNEUM DA 5 IGG SER-MCNC: 0.47 UG/ML
S PNEUM DA 6B IGG SER-MCNC: 2.52 UG/ML
S PNEUM DA 7F IGG SER-MCNC: 5.57 UG/ML
S PNEUM DA 8 IGG SER-MCNC: 2.8 UG/ML
S PNEUM DA 9N IGG SER-MCNC: 5.28 UG/ML
S PNEUM DA 9V IGG SER-MCNC: >11.73 UG/ML
S PNEUM SEROTYPE IGG SER-IMP: NORMAL

## 2024-10-02 ENCOUNTER — APPOINTMENT (OUTPATIENT)
Dept: ORTHOPEDIC SURGERY | Facility: CLINIC | Age: 69
End: 2024-10-02
Payer: COMMERCIAL

## 2024-10-02 DIAGNOSIS — M25.511 ACUTE PAIN OF RIGHT SHOULDER: ICD-10-CM

## 2024-10-02 DIAGNOSIS — S46.011D TRAUMATIC INCOMPLETE TEAR OF RIGHT ROTATOR CUFF, SUBSEQUENT ENCOUNTER: Primary | ICD-10-CM

## 2024-10-02 DIAGNOSIS — S76.311S: ICD-10-CM

## 2024-10-02 PROCEDURE — 1159F MED LIST DOCD IN RCRD: CPT | Performed by: FAMILY MEDICINE

## 2024-10-02 PROCEDURE — 99213 OFFICE O/P EST LOW 20 MIN: CPT | Performed by: FAMILY MEDICINE

## 2024-10-02 PROCEDURE — 1036F TOBACCO NON-USER: CPT | Performed by: FAMILY MEDICINE

## 2024-10-02 PROCEDURE — 1160F RVW MEDS BY RX/DR IN RCRD: CPT | Performed by: FAMILY MEDICINE

## 2024-10-02 NOTE — PROGRESS NOTES
History of Present Illness   Chief Complaint   Patient presents with    Right Shoulder - Follow-up       The patient is 69 y.o. right-hand-dominant female  here for follow-up of right shoulder pain.  Patient was last seen by me little over 2 months ago, see previous note for full details.  Initial onset of symptoms a little over 3 months ago after tripping in her kitchen grabbing onto her counter to prevent herself from falling.  At her initial visit she had minimal discomfort, very limited range of motion with concern for acute rotator cuff tear, x-rays were unremarkable.  She did have MRI obtained that showed a partial-thickness tear of the supraspinatus tendon.  At her follow-up visit she had improved range of motion, still having pain.  We proceeded with subacromial injection with Kenalog and referred patient to physical therapy.  Patient has history of hypogammaglobinemia and says that she has increased risk of infection with surgery and will try to avoid surgery unless absolutely necessary.  She says that injection was of good benefit for around 6 to 8 weeks, she has noticed some worsening pain over the past few weeks.  She has been doing physical therapy, started in early August, did go on vacation and missed few weeks of this.  She would like to continue with physical therapy, says her therapist is requesting a new order.  Pain radiates from the shoulder down the upper aspect of the arm.  She has some pain with overhead activity, she has pain laying on her shoulder at night.          Past Medical History:   Diagnosis Date    Atypical ductal hyperplasia, breast     Breast cancer (Multi)     left    Breast cancer (Multi)     Hypertension     Hypogammaglobulinemia (Multi)     Necrotizing fasciitis (Multi)     right thigh 2016    Personal history of other diseases of the female genital tract     History of endometriosis    PONV (postoperative nausea and vomiting)     Unspecified benign mammary dysplasia of  unspecified breast 11/11/2021    Atypical ductal hyperplasia of breast       Medication Documentation Review Audit       Reviewed by Ye Raphael MD (Physician) on 07/17/24 at 1046      Medication Order Taking? Sig Documenting Provider Last Dose Status   Bacillus subtilis-inulin 1.5 billion cell-1 gram tablet,chewable [id number] No Chew 1 tablet 2 times a day. Esperanza Grossman MD Taking Active   calcium carbonate/vitamin D3 (CALCIUM 600 WITH VITAMIN D3 ORAL) [id number] No Take 2 tablets by mouth 2 times a day. Esperanza Grossman MD Taking Active   clindamycin (Cleocin T) 1 % lotion [id number]  Apply 1-2x daily Alie Gonzalez,   Active   doxycycline (Adoxa) 100 mg tablet [id number] No Take 1 tablet (100 mg) by mouth once daily. Take with a full glass of water and do not lie down for at least 30 minutes after Esperanza Grossman MD Taking Active   eszopiclone (Lunesta) 1 mg tablet [id number] No Take 1 tablet (1 mg) by mouth once daily at bedtime. Take immediately before bedtime Esperanza Grossman MD Taking Active   hydrALAZINE (Apresoline) 25 mg tablet [id number] No Take 1 tablet (25 mg) by mouth 3 times a day. Esperanza Grossman MD Taking Active   multivitamin with minerals tablet [id number] No Take 1 tablet by mouth once daily. Esperanza rGossman MD Taking Active   omega 3-dha-epa-fish oil (Fish OiL) 1,000 mg (120 mg-180 mg) capsule [id number] No Take 1 capsule (1,000 mg) by mouth 2 times a day. Esperanza Grossman MD Taking Active   sulfamethoxazole-trimethoprim (Bactrim DS) 800-160 mg tablet [id number] No Take 1 tablet by mouth once daily. Historical Provider, MD Taking Active                    Allergies   Allergen Reactions    Clindamycin Nausea/vomiting     Oral - caused pancreatitis per pt    Dexilant [Dexlansoprazole] Nausea/vomiting     caused pancreatitis per pt    Flagyl [Metronidazole] Nausea/vomiting     caused pancreatitis per pt    Lisinopril Nausea/vomiting     caused pancreatitis per pt     Morphine Rash and Nausea/vomiting       Social History     Socioeconomic History    Marital status:      Spouse name: Not on file    Number of children: Not on file    Years of education: Not on file    Highest education level: Not on file   Occupational History    Not on file   Tobacco Use    Smoking status: Former     Current packs/day: 0.00     Types: Cigarettes     Start date: 3/1/1999     Quit date: 3/1/2019     Years since quittin.5     Passive exposure: Never    Smokeless tobacco: Never   Vaping Use    Vaping status: Never Used   Substance and Sexual Activity    Alcohol use: Never     Comment: 0    Drug use: Yes     Types: Marijuana     Comment: occas use of marijuana    Sexual activity: Not on file   Other Topics Concern    Not on file   Social History Narrative    Not on file     Social Determinants of Health     Financial Resource Strain: Not on file   Food Insecurity: Not on file   Transportation Needs: Not on file   Physical Activity: Not on file   Stress: Not on file   Social Connections: Not on file   Intimate Partner Violence: Not on file   Housing Stability: Not on file       Past Surgical History:   Procedure Laterality Date    APPENDECTOMY  2013    Appendectomy    BI MAMMO GUIDED BREAST RIGHT LOCALIZATION Right 2022    BI MAMMO GUIDED LOCALIZATION BREAST RIGHT 2022 [facility]    BREAST BIOPSY      ENDOMETRIAL ABLATION      OTHER SURGICAL HISTORY  2021    Tubal ligation    SHOULDER SURGERY      TUBAL LIGATION            Review of Systems   GENERAL: Negative  GI: Negative  MUSCULOSKELETAL: See HPI  SKIN: Negative  NEURO:  Negative     Physical Exam:    General/Constitutional: well appearing, no distress, appears stated age  HEENT: sclera clear  Respiratory: non labored breathing  Vascular: No edema, swelling or tenderness, except as noted in detailed exam.  Integumentary: No impressive skin lesions present, except as noted in detailed exam.  Neurological:   Alert and oriented   Psychological:  Normal mood and affect.  Musculoskeletal: Normal, except as noted in detailed exam and in HPI    Right shoulder: Normal appearance, no skin changes, no ecchymosis, glenohumeral joint is reduced.  She does have tenderness at the subacromial space/supraspinatus insertion at the greater tuberosity.  Range of motion of the shoulder is now full and equal to the left, forward flexion and abduction 170 degrees, internal rotation lower thoracic spine.  There is no significant weakness with rotator cuff strength testing today, she does admit to some pain with resisted abduction and external rotation.  There is some discomfort with Wolfe and Neer's.  Negative drop arm.              Assessment   1. Complete proximal hamstring tendon rupture, right, sequela  Referral to Physical Therapy      2. Traumatic incomplete tear of right rotator cuff, subsequent encounter  Referral to Physical Therapy      3. Acute pain of right shoulder  Referral to Physical Therapy        Partial-thickness supraspinatus tear, 3 months out from injury, overall continuing to improve, did have significant relief following subacromial injection 2 months ago with more recent recurrence of some pain but overall range of motion, pain is improved from time of injury and at 1 month follow-up visit.      Plan: Recommending continued conservative management.  She did inquire about repeat injection, stated that it would be too soon for this, discussed potential risk of worsening tear with repetitive corticosteroid injection.  She is planning to continue with physical therapy for another 6 to 8 weeks which I think is a good plan.  I did include history of prior proximal hamstring tendon tear and her new physical therapy order as she has been having some pain here and therapist that he could treat her for this if she had order with diagnosis.    She will follow-up in 2 months for reassessment of the right shoulder.

## 2024-12-03 ENCOUNTER — HOSPITAL ENCOUNTER (OUTPATIENT)
Dept: RADIATION ONCOLOGY | Facility: CLINIC | Age: 69
Setting detail: RADIATION/ONCOLOGY SERIES
Discharge: HOME | End: 2024-12-03
Payer: COMMERCIAL

## 2024-12-03 VITALS
RESPIRATION RATE: 18 BRPM | SYSTOLIC BLOOD PRESSURE: 167 MMHG | BODY MASS INDEX: 34.27 KG/M2 | WEIGHT: 212.96 LBS | HEART RATE: 73 BPM | DIASTOLIC BLOOD PRESSURE: 81 MMHG | OXYGEN SATURATION: 98 % | TEMPERATURE: 97.3 F

## 2024-12-03 DIAGNOSIS — Z17.0 MALIGNANT NEOPLASM OF CENTRAL PORTION OF LEFT BREAST IN FEMALE, ESTROGEN RECEPTOR POSITIVE: Primary | ICD-10-CM

## 2024-12-03 DIAGNOSIS — C50.112 MALIGNANT NEOPLASM OF CENTRAL PORTION OF LEFT BREAST IN FEMALE, ESTROGEN RECEPTOR POSITIVE: Primary | ICD-10-CM

## 2024-12-03 PROCEDURE — 99213 OFFICE O/P EST LOW 20 MIN: CPT | Performed by: NURSE PRACTITIONER

## 2024-12-03 ASSESSMENT — PAIN SCALES - GENERAL: PAINLEVEL_OUTOF10: 5

## 2024-12-03 NOTE — PROGRESS NOTES
Radiation Oncology Follow-Up    Patient Name:  Bianca Cornell  MRN:  [id number]  :  1955    Referring Provider: No ref. provider found  Primary Care Provider: Isaiah Denise DO  Care Team: Patient Care Team:  Isaiah Denise DO as PCP - General (Allergy and Immunology)  Nakul Baugh MD as Consulting Physician (Hematology and Oncology)    Date of Service: 12/3/2024      Cancer Staging   Malignant neoplasm of central portion of left breast in female, estrogen receptor positive (Multi)  Staging form: Breast, AJCC 8th Edition  - Pathologic: Stage IA (pT1a, pN0(sn), cM0, G2, ER+, NE+, HER2-) - Signed by Constance Sweeney DO on 3/22/2024     Diagnostic/Therapeutic History:     - 2022: excisional biopsy by Dr Kendrick for right atypical ductal hyperplasia     - On : Diagnostic imaging with bl Diagnostic mammogram and US showed a 0.4 cm mass at 9 oclock 5 CFN, 3 morphologically normal axillary LN     - On 2024:  Left breast stereotactic guided core needle biopsy showed IDC G2 ER: 95%, NE: 95%, HER2 negative (0)     - On 2024: Dr Sweeney left PM with SLNB (0)  showed a 4mm focus G2 final stage [id number] ER: 95%, NE: 95%, HER2 negative      - 24 - 2024: partial breast radiation therapy to left breast consisting of a dose of 30 Gy given in 5 fractions of 6 Gy per fraction.      - Pt declined adjuvant endocrine therapy    SUBJECTIVE  History of Present Illness:   Bianca Cornell is here today for routine radiation follow up/surveillance visit.  She is doing well.  She endorses fatigue but is trying to walk daily.  Breast is well healed and skin is intact.  She has declined adjuvant endocrine therapy - she is very sensitive to medications and does not want the side effects.  No headaches, fever, chills, cough, SOB, chest pain, n/v/d/c or bony pain.  She has chronic insomnia and taking Lunesta prn.  She also reports some intermittent tingling in her fingertips.  She says  she has some issues with cervical spine and sees chiropractor for this.     Review of Systems:    Review of Systems   All other systems reviewed and are negative.    Performance Status:   The Karnofsky performance scale today is 90, Able to carry on normal activity; minor signs or symptoms of disease (ECOG equivalent 0).      OBJECTIVE  Vital Signs:  LMP  (LMP Unknown)      Current Outpatient Medications:     Bacillus subtilis-inulin 1.5 billion cell-1 gram tablet,chewable, Chew 1 tablet 2 times a day., Disp: , Rfl:     calcium carbonate/vitamin D3 (CALCIUM 600 WITH VITAMIN D3 ORAL), Take 2 tablets by mouth 2 times a day., Disp: , Rfl:     clindamycin (Cleocin T) 1 % lotion, Apply 1-2x daily, Disp: 60 mL, Rfl: 11    doxycycline (Adoxa) 100 mg tablet, Take 1 tablet (100 mg) by mouth once daily. Take with a full glass of water and do not lie down for at least 30 minutes after, Disp: , Rfl:     eszopiclone (Lunesta) 1 mg tablet, Take 1 tablet (1 mg) by mouth once daily at bedtime. Take immediately before bedtime, Disp: , Rfl:     hydrALAZINE (Apresoline) 25 mg tablet, Take 1 tablet (25 mg) by mouth 3 times a day., Disp: , Rfl:     multivitamin with minerals tablet, Take 1 tablet by mouth once daily., Disp: , Rfl:     omega 3-dha-epa-fish oil (Fish OiL) 1,000 mg (120 mg-180 mg) capsule, Take 1 capsule (1,000 mg) by mouth 2 times a day., Disp: , Rfl:     sulfamethoxazole-trimethoprim (Bactrim DS) 800-160 mg tablet, Take 1 tablet by mouth once daily., Disp: , Rfl:      Physical Exam  Chest:   Breasts:     Right: No swelling, inverted nipple, mass, nipple discharge or skin change.      Left: No swelling, inverted nipple, mass or nipple discharge.          Comments: Medical central area of left breast with tanning in radiation field.  Surgical incision well healed with thickening consistent with post surgical changes.   Lymphadenopathy:      Upper Body:      Right upper body: No supraclavicular or axillary adenopathy.       Left upper body: No supraclavicular or axillary adenopathy.     ASSESSMENT/PLAN:  69 y.o. female with early stage left breast cancer s/p breast conserving surgery followed by radiation. Cosmesis is excellent. Mammogram and FUV with Dr. Sweeney.  Radiation follow up in 6 mo.  Call with any questions or concerns.     Abby Milan CNP  [phone]

## 2025-01-10 NOTE — PROGRESS NOTES
BREAST SURGICAL ONCOLOGY FOLLOW UP     Assessment/Plan     Left breast IDC g2 ER 95 ND 95% HER2- at 9:00 5cmFN measuring 0.4cm on final pathology  -[id number] stage IA    2. New left breast mass 10:00 15CmFN   - U/S biopsy recommended       mammogram and ultrasound today show a new mass in the left breast requiring biopsy.  We discussed the imaging results and the rationale for biopsy.     We discussed the biopsy procedure and potential outcomes includin. Benign        2. High-risk benign necessitating excision        3. The possibility of malignancy.    She will be notified of the biopsy results via phone once available.     All questions were answered the the patient's satisfaction. She was encouraged to return sooner with any questions or concerns.      Subjective   Bianca Cornell is a 69 y.o. female presents today for follow up carcinoma of the left breast.     Treatment history  Surgery: 3/1/2024 Bianca Cornell is a 69 y.o. female here for post op visit s/p left MS PM SLNB.   Pathology              Tumor size: 0.4cm              Lymph nodes: 0/2              Margins: negative, focally close lateral margin              Staging: [id number]   Radiation: XRT -2024 in 5 fx.  Systemic therapy:  declined endocrine tx.      Bilateral mammogram and left U/S today: BIRADSS 4.  Mass seen left breast posterior to prior area  of malignancy.    Review of Systems   Constitutional symptoms: Denies generalized fatigue.  Denies weight change, fevers/chills, difficulty sleeping   Eyes: Denies double vision, glaucoma, cataracts.  Ear/nose/throat/mouth: Denies hearing changes, sore throat, sinus problems.  Cardiovascular: No chest pain.  Denies irregular heartbeat.  Denies ankle swelling.  Respiratory: No wheezing, cough, or shortness of breath.  Gastrointestinal: No abdominal pain,  No nausea/vomiting.  No indigestion/heartburn.  No change in bowel habits.  No constipation or diarrhea.   Genitourinary:  No urinary incontinence.  No urinary frequency.  No painful urination.  Musculoskeletal: No bone pain, no muscle pain, no joint pain.   Integumentary: No rash. No masses.  No changes in moles.  No easy bruising.  Neurological: No headaches.  No tremors. No numbness/tingling.  Psychiatric: No anxiety. No depression.  Endocrine: No excessive thirst.  Not too hot or too cold.  Not tired or fatigued.    Hematological/lymphatic: No swollen glands or blood clotting problems.  No bruising.    Objective   Physical Exam  General: Alert and oriented x 3.  Mood and affect are appropriate.  HEENT: EOMI, PERRLA.   Neck: supple, no masses, no cervical adenopathy.  Cardiovascular: no lower extremity edema.  Pulmonary: breathing non labored on room air.  GI: Abdomen soft, no masses. No hepatomegaly or splenomegaly.  Lymph nodes: No supraclavicular or axillary adenopathy bilaterally.  Musculoskeletal: Full range of motion in the upper extremities bilaterally.  Neuro: denies dizziness, tremors    Breasts: The breasts were examined in both the seated and supine positions.    RIGHT: The nipple is everted without nipple discharge.  There are no skin changes, skin thickening, or dimpling. There are no masses palpated in the RIGHT breast.   LEFT: The nipple is everted without nipple discharge.  There are no skin changes, skin thickening, or dimpling. There are no masses palpated in the LEFT breast. Well healed transverse incision medial breast.      Radiology review: All images and reports were personally reviewed.         Constance Sweeney DO

## 2025-01-13 ENCOUNTER — HOSPITAL ENCOUNTER (OUTPATIENT)
Dept: RADIOLOGY | Facility: HOSPITAL | Age: 70
Discharge: HOME | End: 2025-01-13
Payer: COMMERCIAL

## 2025-01-13 ENCOUNTER — OFFICE VISIT (OUTPATIENT)
Dept: SURGICAL ONCOLOGY | Facility: HOSPITAL | Age: 70
End: 2025-01-13
Payer: COMMERCIAL

## 2025-01-13 VITALS
BODY MASS INDEX: 32.65 KG/M2 | DIASTOLIC BLOOD PRESSURE: 82 MMHG | SYSTOLIC BLOOD PRESSURE: 154 MMHG | HEIGHT: 67 IN | WEIGHT: 208 LBS | RESPIRATION RATE: 16 BRPM | HEART RATE: 78 BPM

## 2025-01-13 DIAGNOSIS — Z17.0 MALIGNANT NEOPLASM OF CENTRAL PORTION OF LEFT BREAST IN FEMALE, ESTROGEN RECEPTOR POSITIVE: ICD-10-CM

## 2025-01-13 DIAGNOSIS — C50.112 MALIGNANT NEOPLASM OF CENTRAL PORTION OF LEFT BREAST IN FEMALE, ESTROGEN RECEPTOR POSITIVE: ICD-10-CM

## 2025-01-13 DIAGNOSIS — R92.8 ABNORMAL FINDING ON BREAST IMAGING: ICD-10-CM

## 2025-01-13 PROCEDURE — 99214 OFFICE O/P EST MOD 30 MIN: CPT | Mod: 25 | Performed by: SURGERY

## 2025-01-13 PROCEDURE — 76983 USE EA ADDL TARGET LESION: CPT | Mod: LT

## 2025-01-13 PROCEDURE — 3077F SYST BP >= 140 MM HG: CPT | Performed by: SURGERY

## 2025-01-13 PROCEDURE — 76642 ULTRASOUND BREAST LIMITED: CPT | Performed by: STUDENT IN AN ORGANIZED HEALTH CARE EDUCATION/TRAINING PROGRAM

## 2025-01-13 PROCEDURE — 1160F RVW MEDS BY RX/DR IN RCRD: CPT | Performed by: SURGERY

## 2025-01-13 PROCEDURE — 3008F BODY MASS INDEX DOCD: CPT | Performed by: SURGERY

## 2025-01-13 PROCEDURE — 76642 ULTRASOUND BREAST LIMITED: CPT | Mod: LT

## 2025-01-13 PROCEDURE — 1159F MED LIST DOCD IN RCRD: CPT | Performed by: SURGERY

## 2025-01-13 PROCEDURE — 99214 OFFICE O/P EST MOD 30 MIN: CPT | Performed by: SURGERY

## 2025-01-13 PROCEDURE — G0279 TOMOSYNTHESIS, MAMMO: HCPCS | Performed by: STUDENT IN AN ORGANIZED HEALTH CARE EDUCATION/TRAINING PROGRAM

## 2025-01-13 PROCEDURE — 1036F TOBACCO NON-USER: CPT | Performed by: SURGERY

## 2025-01-13 PROCEDURE — 77066 DX MAMMO INCL CAD BI: CPT | Performed by: STUDENT IN AN ORGANIZED HEALTH CARE EDUCATION/TRAINING PROGRAM

## 2025-01-13 PROCEDURE — 3079F DIAST BP 80-89 MM HG: CPT | Performed by: SURGERY

## 2025-01-13 PROCEDURE — 77062 BREAST TOMOSYNTHESIS BI: CPT

## 2025-01-24 ENCOUNTER — HOSPITAL ENCOUNTER (OUTPATIENT)
Dept: RADIOLOGY | Facility: HOSPITAL | Age: 70
Discharge: HOME | End: 2025-01-24
Payer: COMMERCIAL

## 2025-01-24 DIAGNOSIS — R92.8 ABNORMAL FINDING ON BREAST IMAGING: ICD-10-CM

## 2025-01-24 PROCEDURE — 19083 BX BREAST 1ST LESION US IMAG: CPT | Mod: LT

## 2025-01-24 PROCEDURE — 76642 ULTRASOUND BREAST LIMITED: CPT | Mod: LT,RSC

## 2025-01-24 PROCEDURE — 2720000007 HC OR 272 NO HCPCS

## 2025-01-24 PROCEDURE — A4648 IMPLANTABLE TISSUE MARKER: HCPCS

## 2025-01-27 ENCOUNTER — TELEPHONE (OUTPATIENT)
Dept: SURGICAL ONCOLOGY | Facility: HOSPITAL | Age: 70
End: 2025-01-27
Payer: COMMERCIAL

## 2025-01-27 NOTE — TELEPHONE ENCOUNTER
I spoke with Bianca today regarding left breast biopsy results which do show a new area of cancer in the left breast. Scheduled office visit tomorrow, 1/28/2025 at [facility] to further discuss diagnosis and treatment options.    Constance Sweeney, DO

## 2025-01-28 ENCOUNTER — OFFICE VISIT (OUTPATIENT)
Dept: SURGICAL ONCOLOGY | Facility: CLINIC | Age: 70
End: 2025-01-28
Payer: COMMERCIAL

## 2025-01-28 DIAGNOSIS — Z17.0 MALIGNANT NEOPLASM OF UPPER-OUTER QUADRANT OF LEFT BREAST IN FEMALE, ESTROGEN RECEPTOR POSITIVE: ICD-10-CM

## 2025-01-28 DIAGNOSIS — C50.412 MALIGNANT NEOPLASM OF UPPER-OUTER QUADRANT OF LEFT BREAST IN FEMALE, ESTROGEN RECEPTOR POSITIVE: ICD-10-CM

## 2025-01-28 PROCEDURE — 99214 OFFICE O/P EST MOD 30 MIN: CPT | Performed by: SURGERY

## 2025-01-28 NOTE — PROGRESS NOTES
BREAST SURGICAL ONCOLOGY FOLLOW UP     Assessment/Plan     Left breast IDC g2 ER 95 ND 95% HER2- at 9:00 5cmFN measuring 0.4cm on final pathology  -[id number] stage IA    2. New left breast IDC g2 receptors pending at 10:00 15CmFN measuring 0.4cm on imaging   - [id number] stage IA   -Seems to be a new primary based on location in the breast    Discussed options for repeat lumpectomy vs mastectomy for local treatment.    Additionally, I was able to discuss her care with radiation oncology.  She completed ultra hypo fractionated less than a year ago, and thus may not be best suited for repeat radiation. Therefore, options would be repeat lumpectomy without radiation or mastectomy.    We also discussed endocrine therapy and her previous decision to decline endocrine therapy given her existing issues with joint pain and concern for worsening bone density.While there may be an increased risk for recurrence without any adjuvant treatment, a mastectomy would be an extensive surgery to treat a very small tumor.      Shared decision making was used to make our surgical plan.  After long discussion, Bianca has ultimately decided to proceed with left magseed localized partial mastectomy.  We discussed the option of mastectomy in the future should she have another cancer develop in this breast.    She also prefers a first start case. I offered her a surgical date for 2/27/2025 for left magseed localized partial mastectomy.      Subjective   Bianca Cornell is a 69 y.o. female presents today for follow up carcinoma of the left breast.     Treatment history  Surgery: 3/1/2024 Bianca Cornell is a 69 y.o. female here for post op visit s/p left MS PM SLNB.   Pathology              Tumor size: 0.4cm              Lymph nodes: 0/2              Margins: negative, focally close lateral margin              Staging: [id number]   Radiation: XRT 4/16-4/26/2024 in 5 fx.  Systemic therapy:  declined endocrine tx.      Bilateral mammogram and  left U/S today: BIRADS 4.  Mass seen left breast posterior to prior area of malignancy.    Review of Systems   Constitutional symptoms: Denies generalized fatigue.  Denies weight change, fevers/chills, difficulty sleeping   Eyes: Denies double vision, glaucoma, cataracts.  Ear/nose/throat/mouth: Denies hearing changes, sore throat, sinus problems.  Cardiovascular: No chest pain.  Denies irregular heartbeat.  Denies ankle swelling.  Respiratory: No wheezing, cough, or shortness of breath.  Gastrointestinal: No abdominal pain,  No nausea/vomiting.  No indigestion/heartburn.  No change in bowel habits.  No constipation or diarrhea.   Genitourinary: No urinary incontinence.  No urinary frequency.  No painful urination.  Musculoskeletal: No bone pain, no muscle pain, no joint pain.   Integumentary: No rash. No masses.  No changes in moles.  No easy bruising.  Neurological: No headaches.  No tremors. No numbness/tingling.  Psychiatric: No anxiety. No depression.  Endocrine: No excessive thirst.  Not too hot or too cold.  Not tired or fatigued.    Hematological/lymphatic: No swollen glands or blood clotting problems.  No bruising.    Objective   Physical Exam  General: Alert and oriented x 3.  Mood and affect are appropriate.  HEENT: EOMI, PERRLA.   Neck: supple, no masses, no cervical adenopathy.  Cardiovascular: no lower extremity edema.  Pulmonary: breathing non labored on room air.  GI: Abdomen soft, no masses. No hepatomegaly or splenomegaly.  Lymph nodes: No supraclavicular or axillary adenopathy bilaterally.  Musculoskeletal: Full range of motion in the upper extremities bilaterally.  Neuro: denies dizziness, tremors    Breasts: The breasts were examined in both the seated and supine positions.    RIGHT: The nipple is everted without nipple discharge.  There are no skin changes, skin thickening, or dimpling. There are no masses palpated in the RIGHT breast.   LEFT: The nipple is everted without nipple discharge.   There are no skin changes, skin thickening, or dimpling. There are no masses palpated in the LEFT breast. Well healed transverse incision medial breast.      Radiology review: All images and reports were personally reviewed.         Constance Sweeney, DO

## 2025-01-30 LAB
LAB AP ASR DISCLAIMER: NORMAL
LABORATORY COMMENT REPORT: NORMAL
PATH REPORT.COMMENTS IMP SPEC: NORMAL
PATH REPORT.FINAL DX SPEC: NORMAL
PATH REPORT.GROSS SPEC: NORMAL
PATH REPORT.RELEVANT HX SPEC: NORMAL
PATH REPORT.TOTAL CANCER: NORMAL
PATHOLOGY SYNOPTIC REPORT: NORMAL
RESIDENT REVIEW: NORMAL

## 2025-02-04 ENCOUNTER — HOSPITAL ENCOUNTER (OUTPATIENT)
Dept: RADIOLOGY | Facility: HOSPITAL | Age: 70
Discharge: HOME | End: 2025-02-04
Payer: COMMERCIAL

## 2025-02-04 DIAGNOSIS — C50.412 MALIGNANT NEOPLASM OF UPPER-OUTER QUADRANT OF LEFT BREAST IN FEMALE, ESTROGEN RECEPTOR POSITIVE: ICD-10-CM

## 2025-02-04 DIAGNOSIS — Z17.0 MALIGNANT NEOPLASM OF UPPER-OUTER QUADRANT OF LEFT BREAST IN FEMALE, ESTROGEN RECEPTOR POSITIVE: ICD-10-CM

## 2025-02-04 PROCEDURE — 19285 PERQ DEV BREAST 1ST US IMAG: CPT | Mod: LT

## 2025-02-04 PROCEDURE — 19285 PERQ DEV BREAST 1ST US IMAG: CPT | Mod: LEFT SIDE | Performed by: RADIOLOGY

## 2025-02-04 PROCEDURE — 77065 DX MAMMO INCL CAD UNI: CPT | Mod: LEFT SIDE | Performed by: RADIOLOGY

## 2025-02-04 PROCEDURE — 2780000003 HC OR 278 NO HCPCS

## 2025-02-04 PROCEDURE — A4648 IMPLANTABLE TISSUE MARKER: HCPCS

## 2025-02-04 PROCEDURE — 2500000004 HC RX 250 GENERAL PHARMACY W/ HCPCS (ALT 636 FOR OP/ED): Performed by: SURGERY

## 2025-02-04 RX ADMIN — Medication 10 ML: at 10:34

## 2025-02-07 DIAGNOSIS — Z17.0 MALIGNANT NEOPLASM OF CENTRAL PORTION OF LEFT BREAST IN FEMALE, ESTROGEN RECEPTOR POSITIVE: ICD-10-CM

## 2025-02-07 DIAGNOSIS — C50.112 MALIGNANT NEOPLASM OF CENTRAL PORTION OF LEFT BREAST IN FEMALE, ESTROGEN RECEPTOR POSITIVE: ICD-10-CM

## 2025-02-10 ENCOUNTER — TELEPHONE (OUTPATIENT)
Dept: HEMATOLOGY/ONCOLOGY | Facility: HOSPITAL | Age: 70
End: 2025-02-10
Payer: COMMERCIAL

## 2025-02-10 NOTE — TELEPHONE ENCOUNTER
Called regarding appt tomorrow with Myriam Ramirez PA-C at [facility]. Recently dx with new primary left breast cancer - plans for 2/27/2025 surgical removal with Dr. Sweeney. Will defer follow up appt to after surgery. Per pt, she cannot take hormonal therapy due to joints. Informed that it may be worthwhile a discussion with Dr. Baugh after surgery. She will consider once surgery is complete. Cancelled tomorrow's appt.

## 2025-02-10 NOTE — PROGRESS NOTES
Patient ID: Bianca Cornell is a 70 y.o. female.    The patient presents to clinic today for her history of breast cancer.     Cancer Staging   Malignant neoplasm of central portion of left breast in female, estrogen receptor positive  Staging form: Breast, AJCC 8th Edition  - Pathologic: Stage IA (pT1a, pN0(sn), cM0, G2, ER+, CA+, HER2-) - Signed by Constance Sweeney DO on 3/22/2024    Malignant neoplasm of upper-outer quadrant of left breast in female, estrogen receptor positive  Staging form: Breast, AJCC 8th Edition  - Clinical: No stage assigned - Unsigned    Diagnostic/Therapeutic History:    - 01/22/2022: excisional biopsy by Dr Kendrick for right atypical ductal hyperplasia    - On 01/11/2024: Diagnostic imaging with bl Diagnostic mammogram and US showed a 0.4 cm mass at 9 oclock 5 CFN, 3 morphologically normal axillary LN    - On 01/12/2024:  Left breast stereotactic guided core needle biopsy showed IDC G2 ER: 95%, CA: 95%, HER2 negative (0)    - On 03/01/2024: Dr Sweeney left PM with SLNB (0/2)  showed a 4mm focus G2 final stage [id number] ER: 95%, CA: 95%, HER2 negative     - On 04/26/2024: completed radiation therapy     - On 1/13/2025: underwent diagnostic mammogram and left breast US At 10 o'clock 15 cm from the nipple, there is an irregular hypoechoic mass measuring 0.4 x 0.2 x 0.3 cm    History of Present Illness (HPI)/Interval History:  Ms. Cornell presents for routine follow up. Recently dx with new primary left breast cancer at 10:00 15 cm from nipple.     She denies any chest pain or breathing issues.     She denies any new or unexplained bone aches or pains.    She reports a normal appetite. Her weight is stable.     She does note some fatigue since xrt but will rest as needed.     PMH: Hypogammaglobunemia, has recurrent infections on suppressive antibiotics,    PSH: left shoulder surgery, hx of Necrotizing fascitis in 2019 s/p debridement    Allergies & Meds: reviewed    Reproductive hx:  Menarche at age 15, , AFLB: 18.  no OCP, no HRT, menopause at 51    Family history: negative     Review of Systems:  14-point ROS otherwise negative, as per HPI.    Past Medical History:   Diagnosis Date    Atypical ductal hyperplasia, breast     Breast cancer (Multi)     left    Breast cancer (Multi)     Hypertension     Hypogammaglobulinemia (Multi)     Necrotizing fasciitis (Multi)     right thigh 2016    Personal history of irradiation     Personal history of other diseases of the female genital tract     History of endometriosis    PONV (postoperative nausea and vomiting)     Unspecified benign mammary dysplasia of unspecified breast 2021    Atypical ductal hyperplasia of breast       Past Surgical History:   Procedure Laterality Date    APPENDECTOMY  2013    Appendectomy    BI MAMMO GUIDED BREAST RIGHT LOCALIZATION Right 2022    BI MAMMO GUIDED LOCALIZATION BREAST RIGHT 2022 [facility]    BREAST BIOPSY      BREAST LUMPECTOMY      ENDOMETRIAL ABLATION      OTHER SURGICAL HISTORY  2021    Tubal ligation    SHOULDER SURGERY      TUBAL LIGATION         Social History     Socioeconomic History    Marital status:    Tobacco Use    Smoking status: Former     Current packs/day: 0.00     Types: Cigarettes     Start date: 3/1/1999     Quit date: 3/1/2019     Years since quittin.9     Passive exposure: Never    Smokeless tobacco: Never   Vaping Use    Vaping status: Never Used   Substance and Sexual Activity    Alcohol use: Never     Comment: 0    Drug use: Yes     Types: Marijuana     Comment: occas use of marijuana       Allergies   Allergen Reactions    Clindamycin Nausea/vomiting     Oral - caused pancreatitis per pt    Dexilant [Dexlansoprazole] Nausea/vomiting     caused pancreatitis per pt    Flagyl [Metronidazole] Nausea/vomiting     caused pancreatitis per pt    Lisinopril Nausea/vomiting     caused pancreatitis per pt    Morphine Rash and Nausea/vomiting          Current Outpatient Medications:     Bacillus subtilis-inulin 1.5 billion cell-1 gram tablet,chewable, Chew 1 tablet 2 times a day., Disp: , Rfl:     calcium carbonate/vitamin D3 (CALCIUM 600 WITH VITAMIN D3 ORAL), Take 2 tablets by mouth 2 times a day., Disp: , Rfl:     clindamycin (Cleocin T) 1 % lotion, Apply 1-2x daily, Disp: 60 mL, Rfl: 11    doxycycline (Adoxa) 100 mg tablet, Take 1 tablet (100 mg) by mouth once daily. Take with a full glass of water and do not lie down for at least 30 minutes after, Disp: , Rfl:     eszopiclone (Lunesta) 1 mg tablet, Take 1 tablet (1 mg) by mouth once daily at bedtime. Take immediately before bedtime, Disp: , Rfl:     hydrALAZINE (Apresoline) 25 mg tablet, Take 1 tablet (25 mg) by mouth 3 times a day., Disp: , Rfl:     multivitamin with minerals tablet, Take 1 tablet by mouth once daily., Disp: , Rfl:     omega 3-dha-epa-fish oil (Fish OiL) 1,000 mg (120 mg-180 mg) capsule, Take 1 capsule (1,000 mg) by mouth 2 times a day., Disp: , Rfl:     sulfamethoxazole-trimethoprim (Bactrim DS) 800-160 mg tablet, Take 1 tablet by mouth once daily., Disp: , Rfl:      Objective    BSA: There is no height or weight on file to calculate BSA.  LMP  (LMP Unknown)     The ECOG performance scale today is ECO- Fully active, able to carry on all pre-disease performance w/o restriction.     Physical Exam      Laboratory Data:  Lab Results   Component Value Date    WBC 11.7 (H) 2024    HGB 14.4 2024    HCT 42.7 2024    MCV 85 2024     2024       Chemistry    Lab Results   Component Value Date/Time     2024 1519    K 3.8 2024 1519     2024 1519    CO2 25 2024 1519    BUN 14 2024 1519    CREATININE 0.74 2024 1519    Lab Results   Component Value Date/Time    CALCIUM 10.2 2024 1519    ALKPHOS 71 2024 0952    AST 15 2024 0952    ALT 18 2024 0952    BILITOT 0.4 2024 0952              Radiology:  BI US guided breast localization left, BI breast biopsy clip imaging  Narrative: Interpreted By:  Anuj Martinez and Marshall Colin   STUDY:  BI US GUIDED BREAST LOCALIZATION LEFT; BI BREAST BIOPSY CLIP IMAGING;  2/4/2025 10:34 am; 2/4/2025 11:11 am      ACCESSION NUMBER(S):  [id number]; [id number]      ORDERING CLINICIAN:  NANCI ARREGUIN      INDICATION:  Magseed localization of a left breast mass for surgical planning.      ,C50.412 Malignant neoplasm of upper-outer quadrant of left female  breast,Z17.0 Estrogen receptor positive status (ER+)      FINDINGS:  PREPROCEDURAL CONSULTATION: The history and physical exam pertinent  to the procedure were reviewed and no updates were made.      The procedure was explained to the patient including the risks,  benefits, and alternatives. Medications were discussed, including any  prior use of blood thinning medications by the patient. Patient  allergies were reviewed. The risks, including but not limited to  infection and bleeding, were reviewed by the performing physician and  the patient agreed to undergo the procedure.      Prior to the procedure, an audible timeout was done to verify patient  identification, site and type of procedure. Dr. Anuj Martinez, Dr. Eren Mendoza, A radiology nurse and an ultrasound technologist were  present.      PROCEDURE: Scanning of the left breast redemonstrated the mass of  concern with associated biopsy marker which had migrated out of the  mass in the 10 o'clock position, 15 cm from the nipple. 5 mL of  buffered 1% lidocaine was injected subcutaneously and into the deeper  tissues using ultrasound guidance. A 7 cm needle containing a Magseed  was advanced into the mass under ultrasound guidance. When the needle  tip was satisfactorily positioned within the mass, the Magseed was  deployed. Scanning of the breast demonstrated the Magseed  satisfactorily positioned within the mass and the adjacent migrated  biopsy  tissue marker.      A mammogram was obtained after placement of the Magseed. This  demonstrates satisfactory location of the Magseed in relation to the  mass and migrated tissue marker.      The patient tolerated the procedure without difficulty.      Impression: Status post ultrasound-guided Magseed localization of a left breast  mass and tissue marker.      POST PROCEDURE MAMMOGRAM FOR MARKER PLACEMENT.      MACRO:  None          Signed by: Anuj Martinez 2/4/2025 1:41 PM  Dictation workstation:   [id number]       BI mammo left diagnostic tomosynthesis 01/11/2024  BI US breast limited left 01/11/2024    Narrative  Interpreted By:  Salima Mendoza and Avery Ross  STUDY:  BI MAMMO LEFT DIAGNOSTIC TOMOSYNTHESIS; BI US BREAST LIMITED LEFT;  1/11/2024 11:59 am; 1/11/2024 12:32 pm    ACCESSION NUMBER(S):  [id number]; [id number]    ORDERING CLINICIAN:  ILIANA BEE    INDICATION:  The patient was recalled from recent screening mammogram 01/11/2024  for a left breast mass.    COMPARISON:  01/11/2024, 08/30/2022, 01/21/2022.    FINDINGS:  MAMMOGRAPHY: 2D and tomosynthesis images were reviewed at 1 mm slice  thickness.    Density:  The breast tissue is heterogeneously dense, which may  obscure small masses.    Spot compression views demonstrate an irregular spiculated equal  density mass in the central medial left breast at anterior depth.    ULTRASOUND:  A targeted ultrasound of the left breast and axilla was  performed using elastography.    An irregular not parallel hypoechoic mass with angular margins and  posterior shadowing is seen at 9 o'clock 5 cm from the nipple. The  mass measures 0.3 x 0.2 x 0.4 cm. It is avascular and soft on  elastography. This corresponds with the mammographic left breast mass.    A targeted ultrasound of the left axilla demonstrates 3  morphologically normal axillary lymph nodes.    Impression  Suspicious left breast mass without axillary lymphadenopathy. Further  evaluation with  surgical consultation and ultrasound-guided biopsy is  recommended. The patient is scheduled to see Lora Smith in  clinic today to discuss the findings and recommendations. A message  was sent to the referring practitioner at the time of this dictation  regarding these critical findings using the Epic notification system.  A pre-procedure form was filled out.    Method of Detection: Category Sdbt - 3D Screening    BI-RADS CATEGORY:    BI-RADS Category:  4 Suspicious.  Recommendation:  Biopsy.  Recommended Date:  Immediate.  Laterality:  Left.    For any future breast imaging appointments, please call [phone]  ([phone]).    I personally reviewed the images/study and I agree with the findings  as stated by fellow physician, Dr. Donnell Mcgraw.      MACRO:  Critical Finding:  Breast Imaging Abnormality. Notification was  initiated on 1/11/2024 at 12:35 pm by  Donnell Mcgraw.  (**-YCF-**)  Instructions:  See Impression for specific recommendations.    Signed by: Salima Mendoza 1/11/2024 1:00 PM  Dictation workstation:   [id number]          Assessment/Plan:    69 year old female patient with hx of hypogammaglobunemia, has has mutiple infections in the past including Nec Fascitis s/p multiple surgeries    - 01/22/2022: excisional biopsy by Dr Kendrick for right atypical ductal hyperplasia    - On 01/11/204: Diagnostic imaging with bl Diagnostic mammogram and US showed a 0.4 cm mass at 9 oclock 5 CFN, 3 morphologically normal axillary LN    - On 01/12/2024:  Left breast stereotactic guided core needle biopsy showed IDC G2 ER: 95%, MT: 95%, HER2 negative (0)    - On 03/01/2024: Dr Sweeney left PM with SLNB (0/2)  showed a 4mm focus G2 final stage [id number] ER: 95%, MT: 95%, HER2 negative     I reviewed with her the events that led to her diagnosis of breast cancer. We reviewed all the procedures and diagnostic imaging she underwent thus far. I discussed the features of her breast cancer that include the size, grade, lymph  node status and hormone receptor/ her2-caryn status.     Completed radiation therapy- deferring hormonal therapy because she was concerned  of side effects. On active surveillance    - Mammogram in Jan 2025 with surgery follow up   - RTC in  6 months with Myriam Ramirez     There is no evidence of breast cancer recurrence based on her clinical exam today.     Myriam Ramirez PA-C  Hematology and Medical Oncology  [facility]

## 2025-02-11 ENCOUNTER — APPOINTMENT (OUTPATIENT)
Dept: HEMATOLOGY/ONCOLOGY | Facility: CLINIC | Age: 70
End: 2025-02-11
Payer: COMMERCIAL

## 2025-02-12 DIAGNOSIS — D80.1 HYPOGAMMAGLOBULINEMIA (MULTI): Primary | ICD-10-CM

## 2025-02-12 ASSESSMENT — DUKE ACTIVITY SCORE INDEX (DASI)
CAN YOU DO MODERATE WORK AROUND THE HOUSE LIKE VACUUMING, SWEEPING FLOORS OR CARRYING GROCERIES: YES
CAN YOU WALK INDOORS, SUCH AS AROUND YOUR HOUSE: YES
CAN YOU HAVE SEXUAL RELATIONS: YES
CAN YOU PARTICIPATE IN MODERATE RECREATIONAL ACTIVITIES LIKE GOLF, BOWLING, DANCING, DOUBLES TENNIS OR THROWING A BASEBALL OR FOOTBALL: NO
CAN YOU CLIMB A FLIGHT OF STAIRS OR WALK UP A HILL: YES
CAN YOU DO LIGHT WORK AROUND THE HOUSE LIKE DUSTING OR WASHING DISHES: YES
CAN YOU PARTICIPATE IN STRENOUS SPORTS LIKE SWIMMING, SINGLES TENNIS, FOOTBALL, BASKETBALL, OR SKIING: NO
CAN YOU TAKE CARE OF YOURSELF (EAT, DRESS, BATHE, OR USE TOILET): YES
CAN YOU WALK A BLOCK OR TWO ON LEVEL GROUND: YES
CAN YOU DO HEAVY WORK AROUND THE HOUSE LIKE SCRUBBING FLOORS OR LIFTING AND MOVING HEAVY FURNITURE: YES
DASI METS SCORE: 7.3
CAN YOU DO YARD WORK LIKE RAKING LEAVES, WEEDING OR PUSHING A MOWER: YES
CAN YOU RUN A SHORT DISTANCE: NO
TOTAL_SCORE: 36.7

## 2025-02-12 ASSESSMENT — LIFESTYLE VARIABLES: SMOKING_STATUS: NONSMOKER

## 2025-02-12 ASSESSMENT — ACTIVITIES OF DAILY LIVING (ADL): ADL_SCORE: 0

## 2025-02-13 ENCOUNTER — PRE-ADMISSION TESTING (OUTPATIENT)
Dept: PREADMISSION TESTING | Facility: HOSPITAL | Age: 70
End: 2025-02-13
Payer: COMMERCIAL

## 2025-02-13 VITALS
TEMPERATURE: 97.3 F | SYSTOLIC BLOOD PRESSURE: 142 MMHG | HEIGHT: 67 IN | HEART RATE: 82 BPM | RESPIRATION RATE: 18 BRPM | WEIGHT: 209 LBS | OXYGEN SATURATION: 98 % | BODY MASS INDEX: 32.8 KG/M2 | DIASTOLIC BLOOD PRESSURE: 68 MMHG

## 2025-02-13 DIAGNOSIS — Z01.818 PREOP EXAMINATION: ICD-10-CM

## 2025-02-13 DIAGNOSIS — Z17.0 MALIGNANT NEOPLASM OF UPPER-OUTER QUADRANT OF LEFT BREAST IN FEMALE, ESTROGEN RECEPTOR POSITIVE: Primary | ICD-10-CM

## 2025-02-13 DIAGNOSIS — C50.412 MALIGNANT NEOPLASM OF UPPER-OUTER QUADRANT OF LEFT BREAST IN FEMALE, ESTROGEN RECEPTOR POSITIVE: Primary | ICD-10-CM

## 2025-02-13 PROCEDURE — 93005 ELECTROCARDIOGRAM TRACING: CPT

## 2025-02-13 PROCEDURE — 80048 BASIC METABOLIC PNL TOTAL CA: CPT

## 2025-02-13 PROCEDURE — 85027 COMPLETE CBC AUTOMATED: CPT

## 2025-02-13 PROCEDURE — 99202 OFFICE O/P NEW SF 15 MIN: CPT | Performed by: NURSE PRACTITIONER

## 2025-02-13 ASSESSMENT — PAIN SCALES - GENERAL: PAINLEVEL_OUTOF10: 0 - NO PAIN

## 2025-02-13 ASSESSMENT — PAIN - FUNCTIONAL ASSESSMENT: PAIN_FUNCTIONAL_ASSESSMENT: 0-10

## 2025-02-13 NOTE — PREPROCEDURE INSTRUCTIONS
Thank you for visiting [facility] at [facility]. If you have any changes to your health condition, please call the SURGEON's office to alert them and give them details of your symptoms.        Preoperative Brain Exercises    What are brain exercises?  A brain exercise is any activity that engages your thinking (cognitive) skills.    What types of activities are considered brain exercises?  Jigsaw puzzles, crossword puzzles, word jumble, memory games, word search, and many more.  Many can be found free online or on your phone via a mobile hattie.    Why should I do brain exercises before my surgery?  More recent research has shown brain exercise before surgery can lower the risk of postoperative delirium (confusion) which can be especially important for older adults.  Patients who did brain exercises for 5 to 10 hours the days before surgery, cut their risk of postoperative delirium in half up to 1 week after surgery.      Preoperative Deep Breathing Exercises    Why it is important to do deep breathing exercises before my surgery?  Deep breathing exercises strengthen your breathing muscles.  This helps you to recover after your surgery and decreases the chance of breathing complications.    How are the deep breathing exercises done?  Sit straight with your back supported.  Breathe in deeply and slowly through your nose. Your lower rib cage should expand and your abdomen may move forward.  Hold that breath for 3 to 5 seconds.  Breathe out through pursed lips, slowly and completely.  Rest and repeat 10 times every hour while awake.  Rest longer if you become dizzy or lightheaded.      Patient and Family Education   Ways You Can Help Prevent Blood Clots     This handout explains some simple things you can do to help prevent blood clots.      Blood clots are blockages that can form in the body's veins. When a blood clot forms in your deep veins, it may be called a deep vein thrombosis, or DVT for short. Blood clots can  happen in any part of the body where blood flows, but they are most common in the arms and legs. If a piece of a blood clot breaks free and travels to the lungs, it is called a pulmonary embolus (PE). A PE can be a very serious problem.      Being in the hospital or having surgery can raise your chances of getting a blood clot because you may not be well enough to move around as much as you normally do.      Ways you can help prevent blood clots in the hospital         Wearing SCDs. SCDs stands for Sequential Compression Devices.   SCDs are special sleeves that wrap around your legs  They attach to a pump that fills them with air to gently squeeze your legs every few minutes.   This helps return the blood in your legs to your heart.   SCDs should only be taken off when walking or bathing.   SCDs may not be comfortable, but they can help save your life.               Wearing compression stockings - if your doctor orders them. These special snug fitting stockings gently squeeze your legs to help blood flow.       Walking. Walking helps move the blood in your legs.   If your doctor says it is ok, try walking the halls at least   5 times a day. Ask us to help you get up, so you don't fall.      Taking any blood thinning medicines your doctor orders.          ©[facility]; 3/23        Ways you can help prevent blood clots at home       Wearing compression stockings - if your doctor orders them. ? Walking - to help move the blood in your legs.       Taking any blood thinning medicines your doctor orders.      Signs of a blood clot or PE      Tell your doctor or nurse know right away if you have of the problems listed below.    If you are at home, seek medical care right away. Call 911 for chest pain or problems breathing.          Signs of a blood clot (DVT) - such as pain,  swelling, redness or warmth in your arm or leg      Signs of a pulmonary embolism (PE) - such as chest     pain or feeling short of breath

## 2025-02-13 NOTE — H&P (VIEW-ONLY)
CPM/PAT Evaluation       Name: Bianca Cornell (iBanca Cornell)  /Age: 1955/70 y.o.     In-Person       Chief Complaint: pre op appointment for breast surgery    HPI    RH is a 71 yo female who has history of left breast cancer with surgical intervention and followed up with radiation. Upon recent follow up testing there is new left breast abnormality detected. She followed up with breast surgical oncology and treatment options discussed, subsequently she is scheduled for a left partial mastectomy. Presents to CPM today for perioperative risk stratification and optimization. Denies any breast pains or skin changes. Otherwise denies any recent illness, fever/chills, chest pains or shortness of breath.     Past Medical History:   Diagnosis Date    Atypical ductal hyperplasia, breast     Breast cancer (Multi) 2023    left    Breast cancer (Multi) 2023    Hypertension     Hypogammaglobulinemia (Multi)     Necrotizing fasciitis (Multi)     right thigh     Personal history of irradiation 2023    Personal history of other diseases of the female genital tract     History of endometriosis    PONV (postoperative nausea and vomiting)     Unspecified benign mammary dysplasia of unspecified breast 2021    Atypical ductal hyperplasia of breast       Past Surgical History:   Procedure Laterality Date    APPENDECTOMY  2013    Appendectomy    BI MAMMO GUIDED BREAST RIGHT LOCALIZATION Right 2022    BI MAMMO GUIDED LOCALIZATION BREAST RIGHT 2022 [facility]    BREAST BIOPSY      BREAST LUMPECTOMY  2023    ENDOMETRIAL ABLATION      OTHER SURGICAL HISTORY  2021    Tubal ligation    SHOULDER SURGERY      TUBAL LIGATION         Patient  has no history on file for sexual activity.    Family History   Problem Relation Name Age of Onset    Diabetes Mother      Hypertension Father      Heart disease Father      Hypertension Brother      Heart attack Brother          Allergies   Allergen Reactions    Clindamycin Nausea/vomiting     Oral - caused pancreatitis per pt    Dexilant [Dexlansoprazole] Nausea/vomiting     caused pancreatitis per pt    Flagyl [Metronidazole] Nausea/vomiting     caused pancreatitis per pt    Lisinopril Nausea/vomiting     caused pancreatitis per pt    Morphine Rash and Nausea/vomiting       Prior to Admission medications    Medication Sig Start Date End Date Taking? Authorizing Provider   Bacillus subtilis-inulin 1.5 billion cell-1 gram tablet,chewable Chew 1 tablet 2 times a day. 11/11/21   Historical Provider, MD   calcium carbonate/vitamin D3 (CALCIUM 600 WITH VITAMIN D3 ORAL) Take 2 tablets by mouth 2 times a day.    Historical Provider, MD   clindamycin (Cleocin T) 1 % lotion Apply 1-2x daily 7/9/24   Alie Gonzalez DO   doxycycline (Adoxa) 100 mg tablet Take 1 tablet (100 mg) by mouth once daily. Take with a full glass of water and do not lie down for at least 30 minutes after    Historical Provider, MD   eszopiclone (Lunesta) 1 mg tablet Take 1 tablet (1 mg) by mouth if needed for sleep. Take immediately before bedtime    Historical Provider, MD   hydrALAZINE (Apresoline) 25 mg tablet Take 1 tablet (25 mg) by mouth 3 times a day.    Historical Provider, MD   multivitamin with minerals tablet Take 1 tablet by mouth once daily.    Historical Provider, MD   omega 3-dha-epa-fish oil (Fish OiL) 1,000 mg (120 mg-180 mg) capsule Take 1 capsule (1,000 mg) by mouth 2 times a day.    Historical Provider, MD   sulfamethoxazole-trimethoprim (Bactrim DS) 800-160 mg tablet Take 1 tablet by mouth once daily.    Historical Provider, MD KOURTNEY DE LA VEGA   Constitutional: Negative for fever, chills, or sweats   ENMT: Negative for nasal discharge, congestion, ear pain, mouth pain, throat pain + eyeglasses  Respiratory: Negative for cough, wheezing, shortness of breath   Cardiac: Negative for chest pain, dyspnea on exertion, palpitations   Gastrointestinal:  "Negative for nausea, vomiting, diarrhea, constipation, abdominal pain  Genitourinary: Negative for dysuria, flank pain, frequency, hematuria   Musculoskeletal: Negative for decreased ROM, pain, swelling, weakness   Neurological: Negative for dizziness, confusion, headache  Psychiatric: Negative for mood changes   Skin: Negative for itching, rash, ulcer    Hematologic/Lymph: Negative for bruising, easy bleeding  Allergic/Immunologic: Negative itching, sneezing, swelling      Physical Exam  Constitutional:       Appearance: Normal appearance.   HENT:      Head: Normocephalic.      Mouth/Throat:      Mouth: Mucous membranes are moist.   Eyes:      Extraocular Movements: Extraocular movements intact.   Cardiovascular:      Rate and Rhythm: Normal rate and regular rhythm.   Pulmonary:      Effort: Pulmonary effort is normal.      Breath sounds: Normal breath sounds.   Abdominal:      General: Abdomen is flat.      Palpations: Abdomen is soft.   Musculoskeletal:         General: Normal range of motion.      Cervical back: Normal range of motion.   Skin:     General: Skin is warm and dry.   Neurological:      General: No focal deficit present.      Mental Status: She is alert.   Psychiatric:         Mood and Affect: Mood normal.          PAT AIRWAY:   Airway:     Mallampati::  II    Neck ROM::  Full   upper dentures      Testing/Diagnostic:   Lab Results   Component Value Date    WBC 11.7 (H) 02/14/2024    HGB 14.4 02/14/2024    HCT 42.7 02/14/2024    MCV 85 02/14/2024     02/14/2024     Lab Results   Component Value Date    GLUCOSE 88 02/14/2024    CALCIUM 10.2 02/14/2024     02/14/2024    K 3.8 02/14/2024    CO2 25 02/14/2024     02/14/2024    BUN 14 02/14/2024    CREATININE 0.74 02/14/2024     Patient Specialist/PCP:   PCP: none  Immuno: Dr. Denise  Onc: Dr. Baugh    Visit Vitals  /68   Pulse 82   Temp 36.3 °C (97.3 °F) (Temporal)   Resp 18   Ht 1.695 m (5' 6.75\")   Wt 94.8 kg (209 lb)   LMP "  (LMP Unknown)   SpO2 98%   BMI 32.98 kg/m²   OB Status Postmenopausal   Smoking Status Former   BSA 2.11 m²       DASI Risk Score      Flowsheet Row Pre-Admission Testing from 2/13/2025 in [facility] Pre-Admission Testing from 2/14/2024 in [facility]   Can you take care of yourself (eat, dress, bathe, or use toilet)?  2.75 filed at 02/12/2025 1034 2.75 filed at 02/14/2024 1423   Can you walk indoors, such as around your house? 1.75 filed at 02/12/2025 1034 1.75 filed at 02/14/2024 1423   Can you walk a block or two on level ground?  2.75 filed at 02/12/2025 1034 2.75 filed at 02/14/2024 1423   Can you climb a flight of stairs or walk up a hill? 5.5 filed at 02/12/2025 1034 5.5 filed at 02/14/2024 1423   Can you run a short distance? 0 filed at 02/12/2025 1034 8 filed at 02/14/2024 1423   Can you do light work around the house like dusting or washing dishes? 2.7 filed at 02/12/2025 1034 2.7 filed at 02/14/2024 1423   Can you do moderate work around the house like vacuuming, sweeping floors or carrying groceries? 3.5 filed at 02/12/2025 1034 3.5 filed at 02/14/2024 1423   Can you do heavy work around the house like scrubbing floors or lifting and moving heavy furniture?  8 filed at 02/12/2025 1034 0 filed at 02/14/2024 1423   Can you do yard work like raking leaves, weeding or pushing a mower? 4.5 filed at 02/12/2025 1034 4.5 filed at 02/14/2024 1423   Can you have sexual relations? 5.25 filed at 02/12/2025 1034 0 filed at 02/14/2024 1423   Can you participate in moderate recreational activities like golf, bowling, dancing, doubles tennis or throwing a baseball or football? 0 filed at 02/12/2025 1034 0 filed at 02/14/2024 1423   Can you participate in strenous sports like swimming, singles tennis, football, basketball, or skiing? 0 filed at 02/12/2025 1034 0 filed at 02/14/2024 1423   DASI SCORE 36.7 filed at 02/12/2025 1034 31.45 filed at 02/14/2024 1423   METS Score (Will be  calculated only when all the questions are answered) 7.3 filed at 02/12/2025 1034 6.6 filed at 02/14/2024 1423          Caprini DVT Assessment      Flowsheet Row Pre-Admission Testing from 2/13/2025 in [facility] Pre-Admission Testing from 2/14/2024 in [facility]   DVT Score (IF A SCORE IS NOT CALCULATING, MUST SELECT A BMI TO COMPLETE) 9 filed at 02/12/2025 1034 9 filed at 02/14/2024 1422   Medical Factors Present cancer, chemotherapy, or previous malignancy filed at 02/12/2025 1034 --   Surgical Factors Major surgery planned, including arthroscopic and laproscopic (1-2 hours) filed at 02/12/2025 1034 --   BMI (BMI MUST BE CHOSEN) 31-40 (Obesity) filed at 02/12/2025 1034 31-40 (Obesity) filed at 02/14/2024 1422   RETIRED: Current Status -- Major surgery planned, including arthroscopic and laproscopic (1-2 hours), Present cancer or chemotherapy filed at 02/14/2024 1422   RETIRED: Age -- 60-75 years filed at 02/14/2024 1422          Modified Frailty Index      Flowsheet Row Pre-Admission Testing from 2/13/2025 in [facility] Pre-Admission Testing from 2/14/2024 in [facility]   Non-independent functional status (problems with dressing, bathing, personal grooming, or cooking) 0 filed at 02/12/2025 1035 0 filed at 02/14/2024 1424   History of diabetes mellitus  0 filed at 02/12/2025 1035 0 filed at 02/14/2024 1424   History of COPD 0 filed at 02/12/2025 1035 0 filed at 02/14/2024 1424   History of CHF No filed at 02/12/2025 1035 No filed at 02/14/2024 1424   History of MI 0 filed at 02/12/2025 1035 0 filed at 02/14/2024 1424   History of Percutaneous Coronary Intervention, Cardiac Surgery, or Angina No filed at 02/12/2025 1035 No filed at 02/14/2024 1424   Hypertension requiring the use of medication  0.0909 filed at 02/12/2025 1035 0.0909 filed at 02/14/2024 1424   Peripheral vascular disease 0 filed at 02/12/2025 1035 0 filed at 02/14/2024 1424   Impaired  sensorium (cognitive impairement or loss, clouding, or delirium) 0 filed at 02/12/2025 1035 0 filed at 02/14/2024 1424   TIA or CVA withouy residual deficit 0 filed at 02/12/2025 1035 0 filed at 02/14/2024 1424   Cerebrovascular accident with deficit 0 filed at 02/12/2025 1035 0 filed at 02/14/2024 1424   Modified Frailty Index Calculator .0909 filed at 02/12/2025 1035 .0909 filed at 02/14/2024 1424          CHADS2 Stroke Risk  Current as of 16 minutes ago        N/A 3 to 100%: High Risk   2 to < 3%: Medium Risk   0 to < 2%: Low Risk     Last Change: N/A          This score determines the patient's risk of having a stroke if the patient has atrial fibrillation.        This score is not applicable to this patient. Components are not calculated.          Revised Cardiac Risk Index      Flowsheet Row Pre-Admission Testing from 2/13/2025 in [facility] Pre-Admission Testing from 2/14/2024 in [facility]   High-Risk Surgery (Intraperitoneal, Intrathoracic,Suprainguinal vascular) 0 filed at 02/12/2025 1035 0 filed at 02/14/2024 1424   History of ischemic heart disease (History of MI, History of positive exercuse test, Current chest paint considered due to myocardial ischemia, Use of nitrate therapy, ECG with pathological Q Waves) 0 filed at 02/12/2025 1035 0 filed at 02/14/2024 1424   History of congestive heart failure (pulmonary edemia, bilateral rales or S3 gallop, Paroxysmal nocturnal dyspnea, CXR showing pulmonary vascular redistribution) 0 filed at 02/12/2025 1035 0 filed at 02/14/2024 1424   History of cerebrovascular disease (Prior TIA or stroke) 0 filed at 02/12/2025 1035 0 filed at 02/14/2024 1424   Pre-operative insulin treatment 0 filed at 02/12/2025 1035 0 filed at 02/14/2024 1424   Pre-operative creatinine>2 mg/dl 0 filed at 02/12/2025 1035 --   Revised Cardiac Risk Calculator 0 filed at 02/12/2025 1035 --          Apfel Simplified Score      Flowsheet Row Pre-Admission Testing  from 2025 in [facility] Pre-Admission Testing from 2024 in [facility]   Smoking status 1 filed at 2025 1035 1 filed at 2024 1424   History of motion sickness or PONV  1 filed at 2025 1035 1 filed at 2024 1424   Use of postoperative opioids 1 filed at 2025 1035 --   Gender - Female 1=Yes filed at 2025 1035 --   Apfel Simplified Score Calculator 4 filed at 2025 1035 --          Risk Analysis Index Results This Encounter         2025  103             Do you live in a place other than your own home?: 0    When did you begin living in the place you are currently residing?: Greater than one year ago    Any kidney failure, kidney not working well, or seeing a kidney doctor (nephrologist)? If yes, was this for kidney stones or another problem?: 0 No    Any history of chronic (long-term) congestive heart failure (CHF)?: 0 No    Any shortness of breath when resting?: 0 No    In the past five years, have you been diagnosed with or treated for cancer?: Yes    During the last 3 months has it become difficult for you to remember things or organize your thoughts?: 0 No    Have you lost weight of 10 pounds or more in the past 3 months without trying?: 0 No    Do you have any loss of appetitie?: 0 No    Getting Around (Mobility): 0 Can get around without help    Eatin Can plan and prepare own meals    Toiletin Can use toilet without any help    Personal Hygiene (Bathing, Hand Washing, Changing Clothes): 0 Can shower or bathe without any help    ALLRED Cancer History: Patient indicates history of cancer    Total Risk Analysis Index Score Without Cancer: 20    Total Risk Analysis Index Score: 34          Stop Bang Score      Flowsheet Row Pre-Admission Testing from 2025 in [facility] Admission (Discharged) from 3/1/2024 in [facility] OR with Constance Sweeney, DO   Do you snore loudly? 0 filed at  02/12/2025 1034 0 filed at 03/01/2024 0803   Do you often feel tired or fatigued after your sleep? 1 filed at 02/12/2025 1034 0 filed at 03/01/2024 0803   Has anyone ever observed you stop breathing in your sleep? 0 filed at 02/12/2025 1034 0 filed at 03/01/2024 0803   Do you have or are you being treated for high blood pressure? 1 filed at 02/12/2025 1034 0 filed at 03/01/2024 0803   Recent BMI (Calculated) 32.8 filed at 02/12/2025 1034 33.5 filed at 03/01/2024 0803   Is BMI greater than 35 kg/m2? 0=No filed at 02/12/2025 1034 0=No filed at 03/01/2024 0803   Age older than 50 years old? 1=Yes filed at 02/12/2025 1034 1=Yes filed at 03/01/2024 0803   Is your neck circumference greater than 17 inches (Male) or 16 inches (Female)? 0 filed at 02/12/2025 1034 --   Gender - Male 0=No filed at 02/12/2025 1034 0=No filed at 03/01/2024 0803   STOP-BANG Total Score 3 filed at 02/12/2025 1034 --          Prodigy: High Risk  Total Score: 8              Prodigy Age Score           ARISCAT Score for Postoperative Pulmonary Complications      Flowsheet Row Pre-Admission Testing from 2/13/2025 in [facility]   Age Calculated Score 3 filed at 02/12/2025 1035   Preoperative SpO2 0 filed at 02/12/2025 1035   Respiratory infection in the last month Either upper or lower (i.e., URI, bronchitis, pneumonia), with fever and antibiotic treatment 0 filed at 02/12/2025 1035   Preoperative anemia (Hgb less than 10 g/dl) 0 filed at 02/12/2025 1035   Surgical incision  0 filed at 02/12/2025 1035   Duration of surgery  16 filed at 02/12/2025 1035   Emergency Procedure  0 filed at 02/12/2025 1035   ARISCAT Total Score  19 filed at 02/12/2025 1035          Yvan Perioperative Risk for Myocardial Infarction or Cardiac Arrest (OSVALDO)      Flowsheet Row Pre-Admission Testing from 2/13/2025 in [facility]   Calculated Age Score 1.38 filed at 02/13/2025 0852   Functional Status  0 filed at 02/13/2025 0852   ASA Class  -3.29  filed at 02/13/2025 0852   Creatinine 0 filed at 02/13/2025 0852   Type of Procedure  -1.61 filed at 02/13/2025 0852   OSVALDO Total Score  -8.77 filed at 02/13/2025 0852   OSVALDO % 0.02 filed at 02/13/2025 0852            Assessment and Plan:     Pre-Op  69 yo female scheduled for LEFT MAG SEED LOCALIZED PARTIAL MASTECTOMY on 2/27/2025 with Dr. Sweeney. Blood work ordered. EKG shows normal sinus rhythm, ventricular rate 71 bpm. Otherwise no further orders indicated.     Cardiac  Hypertension, compliant with hydralazine  DASI Score: 36.7   MET Score: 7.3  RCRI  0 which is 3.9% 30 day risk of MACE (risk for cardiac death, nonfatal myocardial infarction, and nonfactal cardiac arrest)  OSVALDO score which indicates a  0.02 % risk of intraoperative or 30-day postoperative MACE    Pulmonary  No diagnosis or significant findings on chart review or clinical presentation and evaluation.    Preoperative deep breathing educational handout provided to patient.  STOP BANG:  3   points which is a intermediate risk for moderate to severe KENDRA  ARISCAT:    19  points which is a low (1.6%) risk of in-hospital post-op pulmonary complications     Endocrine  BMI 32.98    GI  Apfel: 4 points 79% risk for post operative N/V    /Renal  Counseled on avoiding NSAIDs, adequate hydration    OB/ GYN  History of breast cancer, left dx 2024 s/p surgical intervention radiation    Hematologic  No hematological medical history, however due to high Caprini score of 9 patient is at HIGH risk for perioperative DVT, informative education handout provided    Immuno  Hypogammaglobulinemia, takes daily antibiotic and frequent blood work for strep antibodies; this places patient as immunocompromised  -follows immunology; provider is aware of this upcoming surgery    Skin check:   History necrotizing fasciitis, right thigh 2016   -patient was instructed to make surgeon aware of any skin changes or concerns prior to surgery.     Anesthesia:  Patient endorses  PONV  +motion sickness  ASA 3: A to review    See risk scores as previously documented.

## 2025-02-13 NOTE — PREPROCEDURE INSTRUCTIONS
Medication List            Accurate as of February 13, 2025  9:39 AM. Always use your most recent med list.                Bacillus subtilis-inulin 1.5 billion cell-1 gram tablet,chewable  Additional Medication Adjustments for Surgery: Take last dose 7 days before surgery  Notes to patient: STOP all NSAIDS (Ibuprofen, Motrin, Aleve), vitamins, herbals, supplements, and all over the counter medications for 7 days prior to surgery    Tylenol is okay to take up until the morning of surgery for pain or headache if needed      CALCIUM 600 WITH VITAMIN D3 ORAL  Additional Medication Adjustments for Surgery: Take last dose 7 days before surgery     clindamycin 1 % lotion  Commonly known as: Cleocin T  Apply 1-2x daily  Medication Adjustments for Surgery: Do Not take on the morning of surgery     doxycycline 100 mg tablet  Commonly known as: Adoxa  Medication Adjustments for Surgery: Take/Use as prescribed     eszopiclone 1 mg tablet  Commonly known as: Lunesta  Additional Medication Adjustments for Surgery: Other (Comment)  Notes to patient: Not taking     Fish OiL 1,000 (120-180) mg capsule  Generic drug: omega 3-dha-epa-fish oil  Additional Medication Adjustments for Surgery: Take last dose 7 days before surgery     hydrALAZINE 25 mg tablet  Commonly known as: Apresoline  Medication Adjustments for Surgery: Take/Use as prescribed     multivitamin with minerals tablet  Additional Medication Adjustments for Surgery: Take last dose 7 days before surgery     sulfamethoxazole-trimethoprim 800-160 mg tablet  Commonly known as: Bactrim DS  Medication Adjustments for Surgery: Take/Use as prescribed                                PRE-OPERATIVE INSTRUCTIONS    You will receive notification one business day prior to your procedure to confirm your arrival time. It is important that you answer your phone and/or check your messages during this time. If you do not hear from the surgery center by 5 pm. the day before your procedure,  please call [phone].     Please enter the building through the Outpatient entrance and take the elevator off the lobby to the 2nd floor then check in at the Outpatient Surgery desk on the 2nd floor.    INSTRUCTIONS:  Talk to your surgeon for instructions if you should stop your aspirin, blood thinner, or diabetes medicines.  DO NOT take any multivitamins or over the counter supplements for 7-10 days before surgery.  If not being admitted, you must have an adult immediately available to drive you home after surgery. We also highly recommend you have someone stay with you for the entire day and night of your surgery.  For children having surgery, a parent or legal guardian must accompany them to the surgery center. If this is not possible, please call [phone] to make additional arrangements.  For adults who are unable to consent or make medical decisions for themselves, a legal guardian or Power of  must accompany them to the surgery center. If this is not possible, please call [phone] to make additional arrangements.  Wear comfortable, loose fitting clothing.  All jewelry and piercings must be removed. If you are unable to remove an item or have a dermal piercing, please be sure to tell the nurse when you arrive for surgery.  Nail polish and make-up must be removed.  Avoid smoking or consuming alcohol for 24 hours before surgery.  To help prevent infection, please take a shower/bath and wash your hair the night before and/or morning of surgery (or follow other specific bathing instructions provided).    Preoperative Fasting Guidelines    Why must I stop eating and drinking near surgery time?  With sedation, food or liquid in your stomach can enter your lungs causing serious complications  Increases nausea and vomiting    When do I need to stop eating and drinking before my surgery?  Do not eat any solid food after midnight the night before your surgery/procedure unless otherwise instructed by  your surgeon.   You may have up to 13.5 ounces of clear liquid until TWO hours before your instructed arrival time to the hospital.  This includes water, black tea/coffee, (no milk or cream) apple juice, and electrolyte drinks (Gatorade).   You may chew gum until TWO hours before your surgery/procedure      If applicable, notify your surgeons office immediately of any new skin changes that occur to the surgical limb.      If you have any questions or concerns, please call Pre-Admission Testing at [phone].                   Home Preoperative Antibacterial Shower with Chlorhexidine gluconate (CHG)     What is a home preoperative antibacterial shower?  This shower is a way of cleaning the skin with a germ killing solution before surgery. The solution contains chlorhexidine gluconate, commonly known as CHG. CHG is a skin cleanser with germ killing ability. Let your doctor know if you are allergic to chlorhexidine.    Why do I need to take a preoperative antibacterial shower?  Skin is not sterile. It is best to try to make your skin as free of germs as possible before surgery. Proper cleansing with a germ killing soap before surgery can lower the number of germs on your skin. This helps to reduce the risk of infection at the surgical site. Following the instructions listed below will help you prepare your skin for surgery.    How do I use the solution?  Begin using your CHG soap the night before and again the morning of your procedure.   Do not shave the day before or day of surgery.  Remove all jewelry until after surgery. Take off rings and take out all body-piercing jewelry.  Wash your face and hair with normal soap and shampoo before you use the CHG soap.  Apply the CHG solution to a clean wet washcloth. Move away from the water to avoid premature rinsing of the CHG soap as you are applying. Firmly lather your entire body from the neck down. Do not use CHG on your face, eyes, ears, or genitals.   Pay special  attention to the area where your incisions will be located.  Do not scrub your skin too hard.  It is important to allow the CHG soap to sit on your skin for 3-5 minutes.  Rinse the solution off your body completely. Do not wash with your normal soap after the CHG soap solution.  Pat yourself dry with a clean, soft towel.  Do not apply powders, lotions or deodorants as these might block how the CHG soap works.   Dress in clean clothing.  Be sure to sleep with clean, freshly laundered sheets.  Be aware that CHG can cause stains on fabric. Rinse your washcloth and other linens that have contact with CHG completely. Use only non-chlorine detergents to launder the items used.

## 2025-02-13 NOTE — CPM/PAT H&P
CPM/PAT Evaluation       Name: Bianca Cornell (Bianca Cornell)  /Age: 1955/70 y.o.     In-Person       Chief Complaint: pre op appointment for breast surgery    HPI    RH is a 69 yo female who has history of left breast cancer with surgical intervention and followed up with radiation. Upon recent follow up testing there is new left breast abnormality detected. She followed up with breast surgical oncology and treatment options discussed, subsequently she is scheduled for a left partial mastectomy. Presents to CPM today for perioperative risk stratification and optimization. Denies any breast pains or skin changes. Otherwise denies any recent illness, fever/chills, chest pains or shortness of breath.     Past Medical History:   Diagnosis Date    Atypical ductal hyperplasia, breast     Breast cancer (Multi) 2023    left    Breast cancer (Multi) 2023    Hypertension     Hypogammaglobulinemia (Multi)     Necrotizing fasciitis (Multi)     right thigh     Personal history of irradiation 2023    Personal history of other diseases of the female genital tract     History of endometriosis    PONV (postoperative nausea and vomiting)     Unspecified benign mammary dysplasia of unspecified breast 2021    Atypical ductal hyperplasia of breast       Past Surgical History:   Procedure Laterality Date    APPENDECTOMY  2013    Appendectomy    BI MAMMO GUIDED BREAST RIGHT LOCALIZATION Right 2022    BI MAMMO GUIDED LOCALIZATION BREAST RIGHT 2022 [facility]    BREAST BIOPSY      BREAST LUMPECTOMY  2023    ENDOMETRIAL ABLATION      OTHER SURGICAL HISTORY  2021    Tubal ligation    SHOULDER SURGERY      TUBAL LIGATION         Patient  has no history on file for sexual activity.    Family History   Problem Relation Name Age of Onset    Diabetes Mother      Hypertension Father      Heart disease Father      Hypertension Brother      Heart attack Brother          Allergies   Allergen Reactions    Clindamycin Nausea/vomiting     Oral - caused pancreatitis per pt    Dexilant [Dexlansoprazole] Nausea/vomiting     caused pancreatitis per pt    Flagyl [Metronidazole] Nausea/vomiting     caused pancreatitis per pt    Lisinopril Nausea/vomiting     caused pancreatitis per pt    Morphine Rash and Nausea/vomiting       Prior to Admission medications    Medication Sig Start Date End Date Taking? Authorizing Provider   Bacillus subtilis-inulin 1.5 billion cell-1 gram tablet,chewable Chew 1 tablet 2 times a day. 11/11/21   Historical Provider, MD   calcium carbonate/vitamin D3 (CALCIUM 600 WITH VITAMIN D3 ORAL) Take 2 tablets by mouth 2 times a day.    Historical Provider, MD   clindamycin (Cleocin T) 1 % lotion Apply 1-2x daily 7/9/24   Alie Gonzalez DO   doxycycline (Adoxa) 100 mg tablet Take 1 tablet (100 mg) by mouth once daily. Take with a full glass of water and do not lie down for at least 30 minutes after    Historical Provider, MD   eszopiclone (Lunesta) 1 mg tablet Take 1 tablet (1 mg) by mouth if needed for sleep. Take immediately before bedtime    Historical Provider, MD   hydrALAZINE (Apresoline) 25 mg tablet Take 1 tablet (25 mg) by mouth 3 times a day.    Historical Provider, MD   multivitamin with minerals tablet Take 1 tablet by mouth once daily.    Historical Provider, MD   omega 3-dha-epa-fish oil (Fish OiL) 1,000 mg (120 mg-180 mg) capsule Take 1 capsule (1,000 mg) by mouth 2 times a day.    Historical Provider, MD   sulfamethoxazole-trimethoprim (Bactrim DS) 800-160 mg tablet Take 1 tablet by mouth once daily.    Historical Provider, MD KOURTNEY DE LA VEGA   Constitutional: Negative for fever, chills, or sweats   ENMT: Negative for nasal discharge, congestion, ear pain, mouth pain, throat pain + eyeglasses  Respiratory: Negative for cough, wheezing, shortness of breath   Cardiac: Negative for chest pain, dyspnea on exertion, palpitations   Gastrointestinal:  "Negative for nausea, vomiting, diarrhea, constipation, abdominal pain  Genitourinary: Negative for dysuria, flank pain, frequency, hematuria   Musculoskeletal: Negative for decreased ROM, pain, swelling, weakness   Neurological: Negative for dizziness, confusion, headache  Psychiatric: Negative for mood changes   Skin: Negative for itching, rash, ulcer    Hematologic/Lymph: Negative for bruising, easy bleeding  Allergic/Immunologic: Negative itching, sneezing, swelling      Physical Exam  Constitutional:       Appearance: Normal appearance.   HENT:      Head: Normocephalic.      Mouth/Throat:      Mouth: Mucous membranes are moist.   Eyes:      Extraocular Movements: Extraocular movements intact.   Cardiovascular:      Rate and Rhythm: Normal rate and regular rhythm.   Pulmonary:      Effort: Pulmonary effort is normal.      Breath sounds: Normal breath sounds.   Abdominal:      General: Abdomen is flat.      Palpations: Abdomen is soft.   Musculoskeletal:         General: Normal range of motion.      Cervical back: Normal range of motion.   Skin:     General: Skin is warm and dry.   Neurological:      General: No focal deficit present.      Mental Status: She is alert.   Psychiatric:         Mood and Affect: Mood normal.          PAT AIRWAY:   Airway:     Mallampati::  II    Neck ROM::  Full   upper dentures      Testing/Diagnostic:   Lab Results   Component Value Date    WBC 11.7 (H) 02/14/2024    HGB 14.4 02/14/2024    HCT 42.7 02/14/2024    MCV 85 02/14/2024     02/14/2024     Lab Results   Component Value Date    GLUCOSE 88 02/14/2024    CALCIUM 10.2 02/14/2024     02/14/2024    K 3.8 02/14/2024    CO2 25 02/14/2024     02/14/2024    BUN 14 02/14/2024    CREATININE 0.74 02/14/2024     Patient Specialist/PCP:   PCP: none  Immuno: Dr. Denise  Onc: Dr. Baugh    Visit Vitals  /68   Pulse 82   Temp 36.3 °C (97.3 °F) (Temporal)   Resp 18   Ht 1.695 m (5' 6.75\")   Wt 94.8 kg (209 lb)   LMP "  (LMP Unknown)   SpO2 98%   BMI 32.98 kg/m²   OB Status Postmenopausal   Smoking Status Former   BSA 2.11 m²       DASI Risk Score      Flowsheet Row Pre-Admission Testing from 2/13/2025 in [facility] Pre-Admission Testing from 2/14/2024 in [facility]   Can you take care of yourself (eat, dress, bathe, or use toilet)?  2.75 filed at 02/12/2025 1034 2.75 filed at 02/14/2024 1423   Can you walk indoors, such as around your house? 1.75 filed at 02/12/2025 1034 1.75 filed at 02/14/2024 1423   Can you walk a block or two on level ground?  2.75 filed at 02/12/2025 1034 2.75 filed at 02/14/2024 1423   Can you climb a flight of stairs or walk up a hill? 5.5 filed at 02/12/2025 1034 5.5 filed at 02/14/2024 1423   Can you run a short distance? 0 filed at 02/12/2025 1034 8 filed at 02/14/2024 1423   Can you do light work around the house like dusting or washing dishes? 2.7 filed at 02/12/2025 1034 2.7 filed at 02/14/2024 1423   Can you do moderate work around the house like vacuuming, sweeping floors or carrying groceries? 3.5 filed at 02/12/2025 1034 3.5 filed at 02/14/2024 1423   Can you do heavy work around the house like scrubbing floors or lifting and moving heavy furniture?  8 filed at 02/12/2025 1034 0 filed at 02/14/2024 1423   Can you do yard work like raking leaves, weeding or pushing a mower? 4.5 filed at 02/12/2025 1034 4.5 filed at 02/14/2024 1423   Can you have sexual relations? 5.25 filed at 02/12/2025 1034 0 filed at 02/14/2024 1423   Can you participate in moderate recreational activities like golf, bowling, dancing, doubles tennis or throwing a baseball or football? 0 filed at 02/12/2025 1034 0 filed at 02/14/2024 1423   Can you participate in strenous sports like swimming, singles tennis, football, basketball, or skiing? 0 filed at 02/12/2025 1034 0 filed at 02/14/2024 1423   DASI SCORE 36.7 filed at 02/12/2025 1034 31.45 filed at 02/14/2024 1423   METS Score (Will be  calculated only when all the questions are answered) 7.3 filed at 02/12/2025 1034 6.6 filed at 02/14/2024 1423          Caprini DVT Assessment      Flowsheet Row Pre-Admission Testing from 2/13/2025 in [facility] Pre-Admission Testing from 2/14/2024 in [facility]   DVT Score (IF A SCORE IS NOT CALCULATING, MUST SELECT A BMI TO COMPLETE) 9 filed at 02/12/2025 1034 9 filed at 02/14/2024 1422   Medical Factors Present cancer, chemotherapy, or previous malignancy filed at 02/12/2025 1034 --   Surgical Factors Major surgery planned, including arthroscopic and laproscopic (1-2 hours) filed at 02/12/2025 1034 --   BMI (BMI MUST BE CHOSEN) 31-40 (Obesity) filed at 02/12/2025 1034 31-40 (Obesity) filed at 02/14/2024 1422   RETIRED: Current Status -- Major surgery planned, including arthroscopic and laproscopic (1-2 hours), Present cancer or chemotherapy filed at 02/14/2024 1422   RETIRED: Age -- 60-75 years filed at 02/14/2024 1422          Modified Frailty Index      Flowsheet Row Pre-Admission Testing from 2/13/2025 in [facility] Pre-Admission Testing from 2/14/2024 in [facility]   Non-independent functional status (problems with dressing, bathing, personal grooming, or cooking) 0 filed at 02/12/2025 1035 0 filed at 02/14/2024 1424   History of diabetes mellitus  0 filed at 02/12/2025 1035 0 filed at 02/14/2024 1424   History of COPD 0 filed at 02/12/2025 1035 0 filed at 02/14/2024 1424   History of CHF No filed at 02/12/2025 1035 No filed at 02/14/2024 1424   History of MI 0 filed at 02/12/2025 1035 0 filed at 02/14/2024 1424   History of Percutaneous Coronary Intervention, Cardiac Surgery, or Angina No filed at 02/12/2025 1035 No filed at 02/14/2024 1424   Hypertension requiring the use of medication  0.0909 filed at 02/12/2025 1035 0.0909 filed at 02/14/2024 1424   Peripheral vascular disease 0 filed at 02/12/2025 1035 0 filed at 02/14/2024 1424   Impaired  sensorium (cognitive impairement or loss, clouding, or delirium) 0 filed at 02/12/2025 1035 0 filed at 02/14/2024 1424   TIA or CVA withouy residual deficit 0 filed at 02/12/2025 1035 0 filed at 02/14/2024 1424   Cerebrovascular accident with deficit 0 filed at 02/12/2025 1035 0 filed at 02/14/2024 1424   Modified Frailty Index Calculator .0909 filed at 02/12/2025 1035 .0909 filed at 02/14/2024 1424          CHADS2 Stroke Risk  Current as of 16 minutes ago        N/A 3 to 100%: High Risk   2 to < 3%: Medium Risk   0 to < 2%: Low Risk     Last Change: N/A          This score determines the patient's risk of having a stroke if the patient has atrial fibrillation.        This score is not applicable to this patient. Components are not calculated.          Revised Cardiac Risk Index      Flowsheet Row Pre-Admission Testing from 2/13/2025 in [facility] Pre-Admission Testing from 2/14/2024 in [facility]   High-Risk Surgery (Intraperitoneal, Intrathoracic,Suprainguinal vascular) 0 filed at 02/12/2025 1035 0 filed at 02/14/2024 1424   History of ischemic heart disease (History of MI, History of positive exercuse test, Current chest paint considered due to myocardial ischemia, Use of nitrate therapy, ECG with pathological Q Waves) 0 filed at 02/12/2025 1035 0 filed at 02/14/2024 1424   History of congestive heart failure (pulmonary edemia, bilateral rales or S3 gallop, Paroxysmal nocturnal dyspnea, CXR showing pulmonary vascular redistribution) 0 filed at 02/12/2025 1035 0 filed at 02/14/2024 1424   History of cerebrovascular disease (Prior TIA or stroke) 0 filed at 02/12/2025 1035 0 filed at 02/14/2024 1424   Pre-operative insulin treatment 0 filed at 02/12/2025 1035 0 filed at 02/14/2024 1424   Pre-operative creatinine>2 mg/dl 0 filed at 02/12/2025 1035 --   Revised Cardiac Risk Calculator 0 filed at 02/12/2025 1035 --          Apfel Simplified Score      Flowsheet Row Pre-Admission Testing  from 2025 in [facility] Pre-Admission Testing from 2024 in [facility]   Smoking status 1 filed at 2025 1035 1 filed at 2024 1424   History of motion sickness or PONV  1 filed at 2025 1035 1 filed at 2024 1424   Use of postoperative opioids 1 filed at 2025 1035 --   Gender - Female 1=Yes filed at 2025 1035 --   Apfel Simplified Score Calculator 4 filed at 2025 1035 --          Risk Analysis Index Results This Encounter         2025  103             Do you live in a place other than your own home?: 0    When did you begin living in the place you are currently residing?: Greater than one year ago    Any kidney failure, kidney not working well, or seeing a kidney doctor (nephrologist)? If yes, was this for kidney stones or another problem?: 0 No    Any history of chronic (long-term) congestive heart failure (CHF)?: 0 No    Any shortness of breath when resting?: 0 No    In the past five years, have you been diagnosed with or treated for cancer?: Yes    During the last 3 months has it become difficult for you to remember things or organize your thoughts?: 0 No    Have you lost weight of 10 pounds or more in the past 3 months without trying?: 0 No    Do you have any loss of appetitie?: 0 No    Getting Around (Mobility): 0 Can get around without help    Eatin Can plan and prepare own meals    Toiletin Can use toilet without any help    Personal Hygiene (Bathing, Hand Washing, Changing Clothes): 0 Can shower or bathe without any help    ALLRED Cancer History: Patient indicates history of cancer    Total Risk Analysis Index Score Without Cancer: 20    Total Risk Analysis Index Score: 34          Stop Bang Score      Flowsheet Row Pre-Admission Testing from 2025 in [facility] Admission (Discharged) from 3/1/2024 in [facility] OR with Constance Sweeney, DO   Do you snore loudly? 0 filed at  02/12/2025 1034 0 filed at 03/01/2024 0803   Do you often feel tired or fatigued after your sleep? 1 filed at 02/12/2025 1034 0 filed at 03/01/2024 0803   Has anyone ever observed you stop breathing in your sleep? 0 filed at 02/12/2025 1034 0 filed at 03/01/2024 0803   Do you have or are you being treated for high blood pressure? 1 filed at 02/12/2025 1034 0 filed at 03/01/2024 0803   Recent BMI (Calculated) 32.8 filed at 02/12/2025 1034 33.5 filed at 03/01/2024 0803   Is BMI greater than 35 kg/m2? 0=No filed at 02/12/2025 1034 0=No filed at 03/01/2024 0803   Age older than 50 years old? 1=Yes filed at 02/12/2025 1034 1=Yes filed at 03/01/2024 0803   Is your neck circumference greater than 17 inches (Male) or 16 inches (Female)? 0 filed at 02/12/2025 1034 --   Gender - Male 0=No filed at 02/12/2025 1034 0=No filed at 03/01/2024 0803   STOP-BANG Total Score 3 filed at 02/12/2025 1034 --          Prodigy: High Risk  Total Score: 8              Prodigy Age Score           ARISCAT Score for Postoperative Pulmonary Complications      Flowsheet Row Pre-Admission Testing from 2/13/2025 in [facility]   Age Calculated Score 3 filed at 02/12/2025 1035   Preoperative SpO2 0 filed at 02/12/2025 1035   Respiratory infection in the last month Either upper or lower (i.e., URI, bronchitis, pneumonia), with fever and antibiotic treatment 0 filed at 02/12/2025 1035   Preoperative anemia (Hgb less than 10 g/dl) 0 filed at 02/12/2025 1035   Surgical incision  0 filed at 02/12/2025 1035   Duration of surgery  16 filed at 02/12/2025 1035   Emergency Procedure  0 filed at 02/12/2025 1035   ARISCAT Total Score  19 filed at 02/12/2025 1035          Yvan Perioperative Risk for Myocardial Infarction or Cardiac Arrest (OSVALDO)      Flowsheet Row Pre-Admission Testing from 2/13/2025 in [facility]   Calculated Age Score 1.38 filed at 02/13/2025 0852   Functional Status  0 filed at 02/13/2025 0852   ASA Class  -3.29  filed at 02/13/2025 0852   Creatinine 0 filed at 02/13/2025 0852   Type of Procedure  -1.61 filed at 02/13/2025 0852   OSVALDO Total Score  -8.77 filed at 02/13/2025 0852   OSVALDO % 0.02 filed at 02/13/2025 0852            Assessment and Plan:     Pre-Op  69 yo female scheduled for LEFT MAG SEED LOCALIZED PARTIAL MASTECTOMY on 2/27/2025 with Dr. Sweeney. Blood work ordered. EKG shows normal sinus rhythm, ventricular rate 71 bpm. Otherwise no further orders indicated.     Cardiac  Hypertension, compliant with hydralazine  DASI Score: 36.7   MET Score: 7.3  RCRI  0 which is 3.9% 30 day risk of MACE (risk for cardiac death, nonfatal myocardial infarction, and nonfactal cardiac arrest)  OSVALDO score which indicates a  0.02 % risk of intraoperative or 30-day postoperative MACE    Pulmonary  No diagnosis or significant findings on chart review or clinical presentation and evaluation.    Preoperative deep breathing educational handout provided to patient.  STOP BANG:  3   points which is a intermediate risk for moderate to severe KENDRA  ARISCAT:    19  points which is a low (1.6%) risk of in-hospital post-op pulmonary complications     Endocrine  BMI 32.98    GI  Apfel: 4 points 79% risk for post operative N/V    /Renal  Counseled on avoiding NSAIDs, adequate hydration    OB/ GYN  History of breast cancer, left dx 2024 s/p surgical intervention radiation    Hematologic  No hematological medical history, however due to high Caprini score of 9 patient is at HIGH risk for perioperative DVT, informative education handout provided    Immuno  Hypogammaglobulinemia, takes daily antibiotic and frequent blood work for strep antibodies; this places patient as immunocompromised  -follows immunology; provider is aware of this upcoming surgery    Skin check:   History necrotizing fasciitis, right thigh 2016   -patient was instructed to make surgeon aware of any skin changes or concerns prior to surgery.     Anesthesia:  Patient endorses  PONV  +motion sickness  ASA 3: A to review    See risk scores as previously documented.

## 2025-02-14 ENCOUNTER — LAB (OUTPATIENT)
Dept: LAB | Facility: HOSPITAL | Age: 70
End: 2025-02-14
Payer: COMMERCIAL

## 2025-02-14 DIAGNOSIS — Z17.0 ESTROGEN RECEPTOR POSITIVE STATUS (ER+): ICD-10-CM

## 2025-02-14 DIAGNOSIS — C50.112 MALIGNANT NEOPLASM OF CENTRAL PORTION OF LEFT FEMALE BREAST: Primary | ICD-10-CM

## 2025-02-14 LAB
ANION GAP SERPL CALC-SCNC: 13 MMOL/L (ref 10–20)
BUN SERPL-MCNC: 20 MG/DL (ref 6–23)
CALCIUM SERPL-MCNC: 9.6 MG/DL (ref 8.6–10.6)
CHLORIDE SERPL-SCNC: 101 MMOL/L (ref 98–107)
CO2 SERPL-SCNC: 25 MMOL/L (ref 21–32)
CREAT SERPL-MCNC: 0.73 MG/DL (ref 0.5–1.05)
EGFRCR SERPLBLD CKD-EPI 2021: 89 ML/MIN/1.73M*2
ERYTHROCYTE [DISTWIDTH] IN BLOOD BY AUTOMATED COUNT: 13.5 % (ref 11.5–14.5)
GLUCOSE SERPL-MCNC: 102 MG/DL (ref 74–99)
HCT VFR BLD AUTO: 41.2 % (ref 36–46)
HGB BLD-MCNC: 12.8 G/DL (ref 12–16)
MCH RBC QN AUTO: 28.3 PG (ref 26–34)
MCHC RBC AUTO-ENTMCNC: 31.1 G/DL (ref 32–36)
MCV RBC AUTO: 91 FL (ref 80–100)
NRBC BLD-RTO: 0 /100 WBCS (ref 0–0)
PLATELET # BLD AUTO: 331 X10*3/UL (ref 150–450)
POTASSIUM SERPL-SCNC: 4.3 MMOL/L (ref 3.5–5.3)
RBC # BLD AUTO: 4.52 X10*6/UL (ref 4–5.2)
SODIUM SERPL-SCNC: 135 MMOL/L (ref 136–145)
WBC # BLD AUTO: 9.7 X10*3/UL (ref 4.4–11.3)

## 2025-02-15 LAB
ATRIAL RATE: 71 BPM
P AXIS: 39 DEGREES
P OFFSET: 199 MS
P ONSET: 150 MS
PR INTERVAL: 162 MS
Q ONSET: 231 MS
QRS COUNT: 12 BEATS
QRS DURATION: 80 MS
QT INTERVAL: 382 MS
QTC CALCULATION(BAZETT): 415 MS
QTC FREDERICIA: 404 MS
R AXIS: 17 DEGREES
T AXIS: 65 DEGREES
T OFFSET: 422 MS
VENTRICULAR RATE: 71 BPM

## 2025-02-27 ENCOUNTER — ANESTHESIA (OUTPATIENT)
Dept: OPERATING ROOM | Facility: HOSPITAL | Age: 70
End: 2025-02-27
Payer: COMMERCIAL

## 2025-02-27 ENCOUNTER — HOSPITAL ENCOUNTER (OUTPATIENT)
Dept: RADIOLOGY | Facility: HOSPITAL | Age: 70
Discharge: HOME | End: 2025-02-27
Payer: COMMERCIAL

## 2025-02-27 ENCOUNTER — ANESTHESIA EVENT (OUTPATIENT)
Dept: OPERATING ROOM | Facility: HOSPITAL | Age: 70
End: 2025-02-27
Payer: COMMERCIAL

## 2025-02-27 ENCOUNTER — HOSPITAL ENCOUNTER (OUTPATIENT)
Facility: HOSPITAL | Age: 70
Setting detail: OUTPATIENT SURGERY
Discharge: HOME | End: 2025-02-27
Attending: SURGERY | Admitting: SURGERY
Payer: COMMERCIAL

## 2025-02-27 ENCOUNTER — PHARMACY VISIT (OUTPATIENT)
Dept: PHARMACY | Facility: CLINIC | Age: 70
End: 2025-02-27
Payer: COMMERCIAL

## 2025-02-27 VITALS
SYSTOLIC BLOOD PRESSURE: 148 MMHG | TEMPERATURE: 97.7 F | HEART RATE: 85 BPM | HEIGHT: 67 IN | WEIGHT: 210 LBS | RESPIRATION RATE: 18 BRPM | BODY MASS INDEX: 32.96 KG/M2 | DIASTOLIC BLOOD PRESSURE: 82 MMHG | OXYGEN SATURATION: 98 %

## 2025-02-27 DIAGNOSIS — Z17.0 MALIGNANT NEOPLASM OF UPPER-OUTER QUADRANT OF LEFT BREAST IN FEMALE, ESTROGEN RECEPTOR POSITIVE: ICD-10-CM

## 2025-02-27 DIAGNOSIS — C50.412 MALIGNANT NEOPLASM OF UPPER-OUTER QUADRANT OF LEFT BREAST IN FEMALE, ESTROGEN RECEPTOR POSITIVE: ICD-10-CM

## 2025-02-27 PROCEDURE — 7100000009 HC PHASE TWO TIME - INITIAL BASE CHARGE: Performed by: SURGERY

## 2025-02-27 PROCEDURE — 76098 X-RAY EXAM SURGICAL SPECIMEN: CPT | Mod: LT

## 2025-02-27 PROCEDURE — 76098 X-RAY EXAM SURGICAL SPECIMEN: CPT | Performed by: SURGERY

## 2025-02-27 PROCEDURE — 2500000004 HC RX 250 GENERAL PHARMACY W/ HCPCS (ALT 636 FOR OP/ED): Performed by: SURGERY

## 2025-02-27 PROCEDURE — 2720000007 HC OR 272 NO HCPCS: Performed by: SURGERY

## 2025-02-27 PROCEDURE — 14001 TIS TRNFR TRUNK 10.1-30SQCM: CPT | Performed by: SURGERY

## 2025-02-27 PROCEDURE — 2500000001 HC RX 250 WO HCPCS SELF ADMINISTERED DRUGS (ALT 637 FOR MEDICARE OP): Performed by: ANESTHESIOLOGY

## 2025-02-27 PROCEDURE — 7100000001 HC RECOVERY ROOM TIME - INITIAL BASE CHARGE: Performed by: SURGERY

## 2025-02-27 PROCEDURE — 88307 TISSUE EXAM BY PATHOLOGIST: CPT | Performed by: PATHOLOGY

## 2025-02-27 PROCEDURE — RXMED WILLOW AMBULATORY MEDICATION CHARGE

## 2025-02-27 PROCEDURE — 3700000001 HC GENERAL ANESTHESIA TIME - INITIAL BASE CHARGE: Performed by: SURGERY

## 2025-02-27 PROCEDURE — 2500000004 HC RX 250 GENERAL PHARMACY W/ HCPCS (ALT 636 FOR OP/ED): Performed by: NURSE ANESTHETIST, CERTIFIED REGISTERED

## 2025-02-27 PROCEDURE — 3600000004 HC OR TIME - INITIAL BASE CHARGE - PROCEDURE LEVEL FOUR: Performed by: SURGERY

## 2025-02-27 PROCEDURE — 3600000009 HC OR TIME - EACH INCREMENTAL 1 MINUTE - PROCEDURE LEVEL FOUR: Performed by: SURGERY

## 2025-02-27 PROCEDURE — 88307 TISSUE EXAM BY PATHOLOGIST: CPT | Mod: TC,STJLAB | Performed by: SURGERY

## 2025-02-27 PROCEDURE — 7100000010 HC PHASE TWO TIME - EACH INCREMENTAL 1 MINUTE: Performed by: SURGERY

## 2025-02-27 PROCEDURE — 7100000002 HC RECOVERY ROOM TIME - EACH INCREMENTAL 1 MINUTE: Performed by: SURGERY

## 2025-02-27 PROCEDURE — 3700000002 HC GENERAL ANESTHESIA TIME - EACH INCREMENTAL 1 MINUTE: Performed by: SURGERY

## 2025-02-27 PROCEDURE — 19301 PARTIAL MASTECTOMY: CPT | Performed by: SURGERY

## 2025-02-27 RX ORDER — FENTANYL CITRATE 50 UG/ML
INJECTION, SOLUTION INTRAMUSCULAR; INTRAVENOUS AS NEEDED
Status: DISCONTINUED | OUTPATIENT
Start: 2025-02-27 | End: 2025-02-27

## 2025-02-27 RX ORDER — METOCLOPRAMIDE HYDROCHLORIDE 5 MG/ML
10 INJECTION INTRAMUSCULAR; INTRAVENOUS ONCE AS NEEDED
Status: DISCONTINUED | OUTPATIENT
Start: 2025-02-27 | End: 2025-02-27 | Stop reason: HOSPADM

## 2025-02-27 RX ORDER — ROCURONIUM BROMIDE 10 MG/ML
INJECTION, SOLUTION INTRAVENOUS AS NEEDED
Status: DISCONTINUED | OUTPATIENT
Start: 2025-02-27 | End: 2025-02-27

## 2025-02-27 RX ORDER — LIDOCAINE HYDROCHLORIDE 20 MG/ML
INJECTION, SOLUTION EPIDURAL; INFILTRATION; INTRACAUDAL; PERINEURAL AS NEEDED
Status: DISCONTINUED | OUTPATIENT
Start: 2025-02-27 | End: 2025-02-27

## 2025-02-27 RX ORDER — BUPIVACAINE HYDROCHLORIDE 2.5 MG/ML
INJECTION, SOLUTION INFILTRATION; PERINEURAL AS NEEDED
Status: DISCONTINUED | OUTPATIENT
Start: 2025-02-27 | End: 2025-02-27 | Stop reason: HOSPADM

## 2025-02-27 RX ORDER — MIDAZOLAM HYDROCHLORIDE 1 MG/ML
INJECTION, SOLUTION INTRAMUSCULAR; INTRAVENOUS AS NEEDED
Status: DISCONTINUED | OUTPATIENT
Start: 2025-02-27 | End: 2025-02-27

## 2025-02-27 RX ORDER — ONDANSETRON HYDROCHLORIDE 2 MG/ML
INJECTION, SOLUTION INTRAVENOUS AS NEEDED
Status: DISCONTINUED | OUTPATIENT
Start: 2025-02-27 | End: 2025-02-27

## 2025-02-27 RX ORDER — HYDRALAZINE HYDROCHLORIDE 20 MG/ML
5 INJECTION INTRAMUSCULAR; INTRAVENOUS EVERY 30 MIN PRN
Status: DISCONTINUED | OUTPATIENT
Start: 2025-02-27 | End: 2025-02-27 | Stop reason: HOSPADM

## 2025-02-27 RX ORDER — DIPHENHYDRAMINE HYDROCHLORIDE 50 MG/ML
12.5 INJECTION INTRAMUSCULAR; INTRAVENOUS ONCE AS NEEDED
Status: DISCONTINUED | OUTPATIENT
Start: 2025-02-27 | End: 2025-02-27 | Stop reason: HOSPADM

## 2025-02-27 RX ORDER — SODIUM CHLORIDE, SODIUM LACTATE, POTASSIUM CHLORIDE, CALCIUM CHLORIDE 600; 310; 30; 20 MG/100ML; MG/100ML; MG/100ML; MG/100ML
INJECTION, SOLUTION INTRAVENOUS CONTINUOUS PRN
Status: DISCONTINUED | OUTPATIENT
Start: 2025-02-27 | End: 2025-02-27

## 2025-02-27 RX ORDER — ALBUTEROL SULFATE 0.83 MG/ML
2.5 SOLUTION RESPIRATORY (INHALATION)
Status: DISCONTINUED | OUTPATIENT
Start: 2025-02-27 | End: 2025-02-27 | Stop reason: HOSPADM

## 2025-02-27 RX ORDER — TRAMADOL HYDROCHLORIDE 50 MG/1
50 TABLET ORAL EVERY 6 HOURS PRN
Qty: 12 TABLET | Refills: 0 | Status: SHIPPED | OUTPATIENT
Start: 2025-02-27

## 2025-02-27 RX ORDER — MIDAZOLAM HYDROCHLORIDE 1 MG/ML
1 INJECTION, SOLUTION INTRAMUSCULAR; INTRAVENOUS ONCE AS NEEDED
Status: DISCONTINUED | OUTPATIENT
Start: 2025-02-27 | End: 2025-02-27 | Stop reason: HOSPADM

## 2025-02-27 RX ORDER — LABETALOL HYDROCHLORIDE 5 MG/ML
5 INJECTION, SOLUTION INTRAVENOUS
Status: DISCONTINUED | OUTPATIENT
Start: 2025-02-27 | End: 2025-02-27 | Stop reason: HOSPADM

## 2025-02-27 RX ORDER — ONDANSETRON 4 MG/1
4 TABLET, FILM COATED ORAL EVERY 6 HOURS PRN
Qty: 20 TABLET | Refills: 0 | Status: SHIPPED | OUTPATIENT
Start: 2025-02-27

## 2025-02-27 RX ORDER — PHENYLEPHRINE HCL IN 0.9% NACL 1 MG/10 ML
SYRINGE (ML) INTRAVENOUS AS NEEDED
Status: DISCONTINUED | OUTPATIENT
Start: 2025-02-27 | End: 2025-02-27

## 2025-02-27 RX ORDER — PROPOFOL 10 MG/ML
INJECTION, EMULSION INTRAVENOUS AS NEEDED
Status: DISCONTINUED | OUTPATIENT
Start: 2025-02-27 | End: 2025-02-27

## 2025-02-27 RX ORDER — HYDROMORPHONE HYDROCHLORIDE 1 MG/ML
1 INJECTION, SOLUTION INTRAMUSCULAR; INTRAVENOUS; SUBCUTANEOUS EVERY 5 MIN PRN
Status: DISCONTINUED | OUTPATIENT
Start: 2025-02-27 | End: 2025-02-27 | Stop reason: HOSPADM

## 2025-02-27 RX ORDER — CEFAZOLIN SODIUM 2 G/100ML
INJECTION, SOLUTION INTRAVENOUS AS NEEDED
Status: DISCONTINUED | OUTPATIENT
Start: 2025-02-27 | End: 2025-02-27

## 2025-02-27 RX ORDER — SODIUM CHLORIDE, SODIUM LACTATE, POTASSIUM CHLORIDE, CALCIUM CHLORIDE 600; 310; 30; 20 MG/100ML; MG/100ML; MG/100ML; MG/100ML
100 INJECTION, SOLUTION INTRAVENOUS CONTINUOUS
Status: DISCONTINUED | OUTPATIENT
Start: 2025-02-27 | End: 2025-02-27 | Stop reason: HOSPADM

## 2025-02-27 RX ORDER — TRAMADOL HYDROCHLORIDE 50 MG/1
50 TABLET ORAL EVERY 8 HOURS PRN
Status: DISCONTINUED | OUTPATIENT
Start: 2025-02-27 | End: 2025-02-27 | Stop reason: HOSPADM

## 2025-02-27 RX ADMIN — FENTANYL CITRATE 50 MCG: 50 INJECTION, SOLUTION INTRAMUSCULAR; INTRAVENOUS at 08:57

## 2025-02-27 RX ADMIN — LIDOCAINE HYDROCHLORIDE 80 MG: 20 INJECTION, SOLUTION EPIDURAL; INFILTRATION; INTRACAUDAL; PERINEURAL at 08:57

## 2025-02-27 RX ADMIN — DEXAMETHASONE SODIUM PHOSPHATE 4 MG: 4 INJECTION INTRA-ARTICULAR; INTRALESIONAL; INTRAMUSCULAR; INTRAVENOUS; SOFT TISSUE at 09:04

## 2025-02-27 RX ADMIN — PROPOFOL 200 MG: 10 INJECTION, EMULSION INTRAVENOUS at 08:57

## 2025-02-27 RX ADMIN — ROCURONIUM BROMIDE 50 MG: 10 INJECTION INTRAVENOUS at 08:58

## 2025-02-27 RX ADMIN — FENTANYL CITRATE 50 MCG: 50 INJECTION, SOLUTION INTRAMUSCULAR; INTRAVENOUS at 09:23

## 2025-02-27 RX ADMIN — CEFAZOLIN SODIUM 2 G: 2 INJECTION, SOLUTION INTRAVENOUS at 09:01

## 2025-02-27 RX ADMIN — Medication 100 MCG: at 09:11

## 2025-02-27 RX ADMIN — SUGAMMADEX 200 MG: 100 INJECTION, SOLUTION INTRAVENOUS at 10:06

## 2025-02-27 RX ADMIN — MIDAZOLAM 2 MG: 1 INJECTION INTRAMUSCULAR; INTRAVENOUS at 08:51

## 2025-02-27 RX ADMIN — SODIUM CHLORIDE, POTASSIUM CHLORIDE, SODIUM LACTATE AND CALCIUM CHLORIDE: 600; 310; 30; 20 INJECTION, SOLUTION INTRAVENOUS at 08:52

## 2025-02-27 RX ADMIN — ONDANSETRON 4 MG: 2 INJECTION, SOLUTION INTRAMUSCULAR; INTRAVENOUS at 09:55

## 2025-02-27 RX ADMIN — Medication 100 MCG: at 09:37

## 2025-02-27 RX ADMIN — TRAMADOL HYDROCHLORIDE 50 MG: 50 TABLET, COATED ORAL at 10:56

## 2025-02-27 SDOH — HEALTH STABILITY: MENTAL HEALTH: CURRENT SMOKER: 0

## 2025-02-27 ASSESSMENT — PAIN - FUNCTIONAL ASSESSMENT
PAIN_FUNCTIONAL_ASSESSMENT: 0-10

## 2025-02-27 ASSESSMENT — PAIN SCALES - GENERAL
PAINLEVEL_OUTOF10: 0 - NO PAIN
PAINLEVEL_OUTOF10: 6
PAINLEVEL_OUTOF10: 4
PAINLEVEL_OUTOF10: 4
PAINLEVEL_OUTOF10: 6
PAINLEVEL_OUTOF10: 0 - NO PAIN
PAINLEVEL_OUTOF10: 3
PAINLEVEL_OUTOF10: 0 - NO PAIN
PAINLEVEL_OUTOF10: 4

## 2025-02-27 ASSESSMENT — COLUMBIA-SUICIDE SEVERITY RATING SCALE - C-SSRS
1. IN THE PAST MONTH, HAVE YOU WISHED YOU WERE DEAD OR WISHED YOU COULD GO TO SLEEP AND NOT WAKE UP?: NO
6. HAVE YOU EVER DONE ANYTHING, STARTED TO DO ANYTHING, OR PREPARED TO DO ANYTHING TO END YOUR LIFE?: NO
2. HAVE YOU ACTUALLY HAD ANY THOUGHTS OF KILLING YOURSELF?: NO

## 2025-02-27 NOTE — ANESTHESIA POSTPROCEDURE EVALUATION
Patient: Bianca Cornell    Procedure Summary       Date: 02/27/25 Room / Location: [facility] OR 03 / Virtual [facility] OR    Anesthesia Start: 0850 Anesthesia Stop: 1018    Procedure: LEFT MAG SEED LOCALIZED PARTIAL MASTECTOMY (Left) Diagnosis:       Malignant neoplasm of upper-outer quadrant of left breast in female, estrogen receptor positive      (Malignant neoplasm of upper-outer quadrant of left breast in female, estrogen receptor positive [C50.412, Z17.0])    Surgeons: Constance Sweeney DO Responsible Provider: Camilla Ordonez MD    Anesthesia Type: general ASA Status: 3            Anesthesia Type: general    Vitals Value Taken Time   /91 02/27/25 1100   Temp 36.6 °C (97.9 °F) 02/27/25 1017   Pulse 84 02/27/25 1106   Resp 21 02/27/25 1106   SpO2 98 % 02/27/25 1106   Vitals shown include unfiled device data.    Anesthesia Post Evaluation    Patient location during evaluation: PACU  Patient participation: complete - patient participated  Level of consciousness: awake and alert  Pain management: satisfactory to patient  Airway patency: patent  Cardiovascular status: hemodynamically stable  Respiratory status: spontaneous ventilation  Hydration status: euvolemic  Postoperative Nausea and Vomiting: none        There were no known notable events for this encounter.

## 2025-02-27 NOTE — ANESTHESIA PROCEDURE NOTES
Airway  Date/Time: 2/27/2025 9:00 AM  Urgency: elective    Airway not difficult    Staffing  Performed: DAVID   Authorized by: Camilla Ordonez MD    Performed by: MODESTO Horton  Patient location during procedure: OR    Indications and Patient Condition  Indications for airway management: anesthesia  Spontaneous Ventilation: absent  Sedation level: deep  Preoxygenated: yes  Patient position: sniffing  MILS maintained throughout  Mask difficulty assessment: 2 - vent by mask + OA or adjuvant +/- NMBA  Planned trial extubation    Final Airway Details  Final airway type: endotracheal airway      Successful airway: ETT  Cuffed: yes   Successful intubation technique: video laryngoscopy  Facilitating devices/methods: intubating stylet  Endotracheal tube insertion site: oral  Blade size: #3  ETT size (mm): 7.0  Cormack-Lehane Classification: grade I - full view of glottis  Placement verified by: chest auscultation   Measured from: lips  ETT to lips (cm): 21  Number of attempts at approach: 1

## 2025-02-27 NOTE — OP NOTE
Date: 2025  OR Location: [facility]    Name: Bianca Cornell, : 1955, Age: 70 y.o., MRN: [id number], Sex: female    Diagnosis  Pre-op Diagnosis      * Malignant neoplasm of upper-outer quadrant of left breast in female, estrogen receptor positive [C50.412, Z17.0] Post-op Diagnosis     * Malignant neoplasm of upper-outer quadrant of left breast in female, estrogen receptor positive [C50.412, Z17.0]     Procedures  LEFT MAG SEED LOCALIZED PARTIAL MASTECTOMY  26267 - [place] MASTECTOMY PARTIAL      Surgeons      * Constance Sweeney - Primary    Resident/Fellow/Other Assistant:  Surgeons and Role:  * No surgeons found with a matching role *    Procedure Summary  Anesthesia: General  ASA: III  Anesthesia Staff: Anesthesiologist: Camilla Ordonez MD  C-AA: MODESTO Horton  DAVID: Marina Beyer  Estimated Blood Loss: 2mL    Staff:   Circulator: Elaine Strickland RN; Micki Prakash RN  Scrub Person: Susanne Townsend; Laura Wade    Details of Procedure:    The patient underwent pre-operative magnetic seed localization in the radiology department prior to surgery.  Localization studies were reviewed confirming appropriate localization. The patient was identified by name and birth date and the operative site was marked. The patient was then transported to the operative suite. A sign-in was performed verifying patient identity, procedure and laterality.  One dose of preoperative antibiotics was given.  After induction of anesthesia the left breast and axilla were prepped and draped in the usual sterile fashion.   Just prior to incision, a time-out was performed confirming patient identity, procedure and laterality. The sentimag probe was used to identify the location of the targeted lesion and marked on the skin.  A transverse incision was made, and flaps were raised in the direction of the targeted lesion.  The target lesion was then circumferentially dissected using electrocautery.  Circumferential dissection was  carried out to include the targeted lesion and localization seed.  The probe was used to again confirm the presence of the localization seed within the specimen. Once the specimen was completed dissected it was oriented with a silk suture- short suture superior, long suture lateral and passed off the field.  The Vector ink kit was used to ink the specimen: red = superior, blue = inferior, yellow = medial, orange = lateral, green = anterior, and black = posterior.  Six additional cavity margins were taken: superior, inferior, medial, lateral, anterior and posterior with black ink denoting the new margin. A specimen radiograph was performed.  I reviewed the specimen radiograph myself confirming removal of the Magseed, migrated biopsy clip, and lesion. This image is saved in PACS for imaging documented removal of the targeted lesion.   The posterior extent of dissection did include pectoralis fascia. Clips were placed to denote the area of resection. Next, meticulous hemostasis was achieved.  In order to close the cavity and decrease the cosmetic defect and thereby improve aesthetic outcome, a tissue transfer procedure was performed.  Using electrocautery, separate tissue incisions were made on the anterior and posterior aspects.  Flaps were raised in the medial, lateral, inferior and superior locations.  These flaps were then elevated for a total area of 10 square centimeters.  The flaps were then rotated and reapproximated in multiple layers to recreate the breast mound.  The dermis was closed with 3-0 Vicryl suture and the skin was closed with a running 4-0 Monocryl.  Benzoin and steri-strips were used as dressing.  The patient was then awoken from anesthesia and transported to the PACU in satisfactory condition.    Task Performed by RNFA or Surgical Assistant: NA, resident assist    SPECIMENS:    1. Left magseed localized partial mastectomy short suture superior, long lateral  2. new superior margin, black ink at  new margin  3. new inferior margin, black ink at new margin  4. new medial margin, black ink at new margin  5. new lateral margin, black ink at new margin  6. new anterior margin, black ink at new margin  7. new posterior margin, black ink at new margin      Constance Sweeney, DO

## 2025-02-27 NOTE — DISCHARGE INSTRUCTIONS
DR. ARREGUIN'S BREAST SURGERY DISCHARGE INSTRUCTIONS    Wound Care    Do not remove the surgical bra for 24 hours.  Wear the bra to bed to reduce movement.  Your incisions are covered with steri strips.  Leave these in place until they begin to lift from the edges.  If steri strips are still in place after 10 days you may remove the strips.  You can place ice on your breast as needed for pain relief  (20 minutes on / 20 minutes off).    Activity    You may shower after 24 hours, but no baths, tubs, or swimming for 2 weeks.  Do not lift anything more than 10lbs or do any strenuous activity until seen in post op.  You may drive when you are not taking narcotic pain medication.    Medication    Take prescription narcotic medication for severe, breakthrough pain.  Do not drive while taking narcotics.  You may take acetaminophen (Tylenol), ibuprofen (Motrin), or naproxen (Aleve) for pain.  Do not take NSAIDS if you have kidney disease, are on blood thinners, or have been previously told to avoid NSAIDS.  You may take over the counter Arnica pills by mouth or use topical cream (Arnicare)  to help with any bruising.    When to Call    Your wound is red, swollen, or has a lot of bleeding or drainage.  Your pain is not controlled by the medicines.  You have a fever or 100F or greater.      Follow Up    Dr. Arreguin will review your pathology results at your post-op visit.  Pathology results may take up to 2 weeks or longer.  Results are available immediately on [facility] once finalized.  Please call the office at [phone] to make a follow up appointment.

## 2025-02-27 NOTE — ANESTHESIA PREPROCEDURE EVALUATION
Patient: Bianca Cornell    Procedure Information       Date/Time: 02/27/25 0855    Procedure: LEFT MAG SEED LOCALIZED PARTIAL MASTECTOMY (Left)    Location: [facility] OR 04 / [facility]    Surgeons: Constance Sweeney, DO            Relevant Problems   Cardiac   (+) Atherosclerotic heart disease of native coronary artery without angina pectoris   (+) Hypertension      Pulmonary   (+) Uncomplicated asthma (HHS-HCC)      Neuro   (+) Anxiety disorder   (+) Epilepsy      GI   (+) Gastroesophageal reflux disease without esophagitis      Endocrine   (+) Obesity      Musculoskeletal   (+) Chondromalacia of left shoulder   (+) Osteoarthritis      ID   (+) Hidradenitis suppurativa      GYN   (+) Malignant neoplasm of central portion of left breast in female, estrogen receptor positive   (+) Malignant neoplasm of upper-outer quadrant of left breast in female, estrogen receptor positive       Clinical information reviewed:   Tobacco  Allergies  Meds   Med Hx  Surg Hx  OB Status  Fam Hx  Soc   Hx        NPO Detail:  NPO/Void Status  Carbohydrate Drink Given Prior to Surgery? : N  Date of Last Liquid: 02/27/25  Time of Last Liquid: 0530  Date of Last Solid: 02/26/25  Time of Last Solid: 2000  Last Intake Type: Clear fluids  Time of Last Void: 0700         Physical Exam    Airway  Mallampati: III  TM distance: >3 FB  Neck ROM: full     Cardiovascular   Rhythm: regular  Rate: normal     Dental   (+) upper dentures     Pulmonary   Breath sounds clear to auscultation     Abdominal - normal exam             Anesthesia Plan    History of general anesthesia?: yes  History of complications of general anesthesia?: no    ASA 3     general     The patient is not a current smoker.    intravenous induction   Anesthetic plan and risks discussed with patient.    Plan discussed with CAA.

## 2025-02-28 ENCOUNTER — TELEPHONE (OUTPATIENT)
Dept: SURGICAL ONCOLOGY | Facility: HOSPITAL | Age: 70
End: 2025-02-28
Payer: COMMERCIAL

## 2025-02-28 NOTE — TELEPHONE ENCOUNTER
Left detailed message checking in on post-operative recovery.  Encouraged to contact the office with any concerns.

## 2025-03-10 LAB
LAB AP BLOCK FOR ADDITIONAL STUDIES: NORMAL
LABORATORY COMMENT REPORT: NORMAL
PATH REPORT.FINAL DX SPEC: NORMAL
PATH REPORT.GROSS SPEC: NORMAL
PATH REPORT.RELEVANT HX SPEC: NORMAL
PATH REPORT.TOTAL CANCER: NORMAL
PATHOLOGY SYNOPTIC REPORT: NORMAL

## 2025-03-24 ENCOUNTER — OFFICE VISIT (OUTPATIENT)
Dept: SURGICAL ONCOLOGY | Facility: HOSPITAL | Age: 70
End: 2025-03-24
Payer: COMMERCIAL

## 2025-03-24 VITALS — WEIGHT: 210 LBS | HEIGHT: 67 IN | BODY MASS INDEX: 32.96 KG/M2

## 2025-03-24 DIAGNOSIS — Z17.0 MALIGNANT NEOPLASM OF UPPER-INNER QUADRANT OF LEFT BREAST IN FEMALE, ESTROGEN RECEPTOR POSITIVE: Primary | ICD-10-CM

## 2025-03-24 DIAGNOSIS — C50.212 MALIGNANT NEOPLASM OF UPPER-INNER QUADRANT OF LEFT BREAST IN FEMALE, ESTROGEN RECEPTOR POSITIVE: Primary | ICD-10-CM

## 2025-03-24 DIAGNOSIS — C50.412 MALIGNANT NEOPLASM OF UPPER-OUTER QUADRANT OF LEFT BREAST IN FEMALE, ESTROGEN RECEPTOR POSITIVE: ICD-10-CM

## 2025-03-24 DIAGNOSIS — Z17.0 MALIGNANT NEOPLASM OF UPPER-OUTER QUADRANT OF LEFT BREAST IN FEMALE, ESTROGEN RECEPTOR POSITIVE: ICD-10-CM

## 2025-03-24 PROCEDURE — 1160F RVW MEDS BY RX/DR IN RCRD: CPT | Performed by: SURGERY

## 2025-03-24 PROCEDURE — 1159F MED LIST DOCD IN RCRD: CPT | Performed by: SURGERY

## 2025-03-24 PROCEDURE — 3008F BODY MASS INDEX DOCD: CPT | Performed by: SURGERY

## 2025-03-24 PROCEDURE — 1036F TOBACCO NON-USER: CPT | Performed by: SURGERY

## 2025-03-24 PROCEDURE — 99211 OFF/OP EST MAY X REQ PHY/QHP: CPT | Performed by: SURGERY

## 2025-03-24 NOTE — PROGRESS NOTES
BREAST SURGERY POST OPERATIVE VISIT    Assessment/Plan     Left breast IDC g2 ER 95 MD 95% HER2- at 9:00 5cmFN measuring 0.4cm on final pathology  -[id number] stage IA     2.  New left breast IDC g2 ER 95% MD 41-50% HER2- at 10:00 15cmFN measuring 0.7cm on final pathology              - [id number] stage IA              -Seems to be a new primary based on location in the breast    We reviewed the pathology results from surgery and the patient was provided with a copy of the pathology report.    The cancer has been excised to negative margins.  Her surgical treatment is complete.    She's established with Dr. Baugh.  Will need follow up with him.  Will have Bianca meet with Dr. Nina regarding adjuvant XRT recommendations. She did complete partial breast radiation less than 1 year ago.      I will follow up at the time of next mammogram for imaging and clinical exam or sooner for any breast concerns.    Subjective   Bianca Cornell is a 70 y.o. female here for post op visit s/p left Magseed localized partial mastectomy.    Date of surgery: 2/27/2025  Pathology   Tumor size: 0.7cm   Lymph nodes: NA   Margins: negative, >2mm   Staging: [id number]     Objective   Physical Exam  General: Alert and oriented x 3.  Mood and affect are appropriate.  HEENT: EOMI, PERRLA.   Neck: supple, no masses, no cervical adenopathy.  Cardiovascular: no lower extremity edema.  Pulmonary: breathing non labored on room air.  GI: Abdomen soft, no masses. No hepatomegaly or splenomegaly.  Lymph nodes: No supraclavicular or axillary adenopathy bilaterally.  Musculoskeletal: Full range of motion in the upper extremities bilaterally.  Neuro: denies dizziness, tremors    Breasts: The breasts were examined in both the seated and supine positions.    RIGHT: The nipple is everted without nipple discharge.  There are no skin changes, skin thickening, or dimpling. There are no masses palpated in the RIGHT breast.   LEFT: The nipple is everted without  nipple discharge.  There are no skin changes, skin thickening, or dimpling. There are no masses palpated in the LEFT breast.  Healing scar UIQ. Well healed scar lower inner quadrant.    Radiology review: All images and reports were personally reviewed.         Constance Sweeney, DO

## 2025-04-01 NOTE — PROGRESS NOTES
Staff Physician: Clemencia Nina MD  Referring Physician: Constance Sweeney DO  Date of Service: 4/2/2025  Patient name: Bianca Cornell   MRN: [id number]    RADIATION ONCOLOGY CONSULT NOTE    IDENTIFYING DATA:  DIAGNOSIS: Newly diagnosed, localized  Cancer Staging   Malignant neoplasm of central portion of left breast in female, estrogen receptor positive  Staging form: Breast, AJCC 8th Edition  - Pathologic: Stage IA (pT1a, pN0(sn), cM0, G2, ER+, OK+, HER2-) - Signed by Constance Sweeney DO on 3/22/2024    Malignant neoplasm of upper-inner quadrant of left breast in female, estrogen receptor positive  Staging form: Breast, AJCC 8th Edition  - Pathologic: pT1b, cN0, cM0, G2, ER+, OK+, HER2- - Signed by Constance Sweeney DO on 3/24/2025    DISEASE STATE: Undergoing active treatment  DISEASE STATUS: Under treatment, pending re-staging  Problem List Items Addressed This Visit    None      IDENTIFICATION  Ms. Bianca Cornell is a 70-year-old with history of LEFT breast cancer in LIQ pT1aN0 IDC ER+OK+Gun6zix treated with breast conservation surgery followed by APBI 30 Gy in 5 fractions in 5/2024 now with a new (versus less likely recurrence) left breast UIQ pT1bNx IDC ER+OK+Cvi3esrsstit (1.2 cm IDC, grade 2, no LVSI, margins negative, oncotype dx 22) treated with partial mastectomy.  She presents to discuss radiation therapy options.       ONCOLOGIC HISTORY:    1/11/2024 [facility] Dx Mammogram (FU right breast ADH)  Right breast: post op scarring from ADH excision  Left breast: mass in central medial left breast with distortion    Left breast US @ 9 o'clock N+5 cm: 0.3 x 0.2 x 0.4 cm hypoechoic mass.    Left axillary US: benign    1/12/2024 US guided left breast biopsy @ 9 o'clock N+ 5 cm (hourglass trimark).  Pathology: IDC grade 2 ER + >95%, OK+ >95% Her2 negative. Note the biopsy clip was displaced 10 cm laterally and 4.2 cm anteriorly    3/1/2024 Left breast partial mastectomy and SLN Biopsy: residual 0.4 cm disease  (identified only in additional lateral margin specimen, margins clear at ink < 1 mm), grade 2, no LVSI, no DCIS.  ADH present.  0/2 SLN.  pT1aN0.    Specimen Xray: demosntrates magseed and mass.  As noted above biopsy clip demonstrated migration on post-biopsy mammogram and was not included in the resection.    4/2024 APBI 30 Gy in 5 fractions left medial lower breast cavity.    1/13/2025 [facility] Diagnostic Mammogram:  Left breast: post lumpectomy changes inferior medial left breast, mass noted in superomedial left breast at posterior depth.    Right breast benign.  Left breast US @ 9 o'clock N+6 cm - benign post surgical changes.  Left breast US @ 10 o'clock N+15 cm - 0.4 x 0.2 x 0.3 cm irregular hypoechoic ass.        1/24/2025 Left breast US guided biopsy @ 10 o'clock N+15 cm (open coil clip).  Pathology: IDC grade 2 ER+>95%TN+41-50%[id number] (1+IHC).  Left axilla US: normal axillary nodes, no internal mammary nodes    2/27/2025 Left breast partial mastectomy and SLN Bx:  0.7 cm IDC grade 2, margins negative, DCIS present, no LVSI.  pT1bNx  Oncotype dx 22    She is recovering well from surgery.  She did well with radiation treatment last year.  She does hypogammaglobulinemia for which she has been hospitalized for serious infections.   She had declined endocrine therapy in 2024.  She had no breast symptoms at diagnosis.  Her prior radiation course is outlined above.  She tolerated it well.      PAST MEDICAL HISTORY:  Past Medical History:   Diagnosis Date    Atypical ductal hyperplasia, breast 2022    Breast cancer (Multi) january 2023    left    Breast cancer (Multi) january 2023    Hypertension     Hypogammaglobulinemia (Multi)     Necrotizing fasciitis (Multi)     right thigh 2016    Personal history of irradiation march 2023    Personal history of other diseases of the female genital tract     History of endometriosis    PONV (postoperative nausea and vomiting)     Unspecified benign mammary dysplasia of  unspecified breast 11/11/2021    Atypical ductal hyperplasia of breast     PAST SURGICAL HISTORY:  Past Surgical History:   Procedure Laterality Date    APPENDECTOMY  05/29/2013    Appendectomy    BI MAMMO GUIDED BREAST RIGHT LOCALIZATION Right 01/21/2022    BI MAMMO GUIDED LOCALIZATION BREAST RIGHT 1/21/2022 [facility]    BREAST BIOPSY  2023    BREAST LUMPECTOMY  1 2023    ENDOMETRIAL ABLATION      OTHER SURGICAL HISTORY  08/17/2021    Tubal ligation    SHOULDER SURGERY      TUBAL LIGATION       ALLERGIES:  Allergies   Allergen Reactions    Clindamycin Nausea/vomiting     Oral - caused pancreatitis per pt    Dexilant [Dexlansoprazole] Nausea/vomiting     caused pancreatitis per pt    Dilaudid [Hydromorphone] GI Upset    Flagyl [Metronidazole] Nausea/vomiting     caused pancreatitis per pt    Lisinopril Nausea/vomiting     caused pancreatitis per pt    Morphine Rash and Nausea/vomiting     MEDICATIONS:    Current Outpatient Medications:     Bacillus subtilis-inulin 1.5 billion cell-1 gram tablet,chewable, Chew 1 tablet 2 times a day., Disp: , Rfl:     calcium carbonate/vitamin D3 (CALCIUM 600 WITH VITAMIN D3 ORAL), Take 2 tablets by mouth 2 times a day., Disp: , Rfl:     clindamycin (Cleocin T) 1 % lotion, Apply 1-2x daily, Disp: 60 mL, Rfl: 11    doxycycline (Adoxa) 100 mg tablet, Take 1 tablet (100 mg) by mouth once daily. Take with a full glass of water and do not lie down for at least 30 minutes after, Disp: , Rfl:     eszopiclone (Lunesta) 1 mg tablet, Take 1 tablet (1 mg) by mouth if needed for sleep. Take immediately before bedtime, Disp: , Rfl:     hydrALAZINE (Apresoline) 25 mg tablet, Take 1 tablet (25 mg) by mouth 3 times a day., Disp: , Rfl:     multivitamin with minerals tablet, Take 1 tablet by mouth once daily., Disp: , Rfl:     omega 3-dha-epa-fish oil (Fish OiL) 1,000 mg (120 mg-180 mg) capsule, Take 1 capsule (1,000 mg) by mouth 2 times a day., Disp: , Rfl:     ondansetron (Zofran) 4 mg  tablet, Take 1 tablet (4 mg) by mouth every 6 hours if needed for nausea for up to 20 doses., Disp: 20 tablet, Rfl: 0    sulfamethoxazole-trimethoprim (Bactrim DS) 800-160 mg tablet, Take 1 tablet by mouth once daily., Disp: , Rfl:    SOCIAL HISTORY:  Social History     Tobacco Use    Smoking status: Former     Current packs/day: 0.00     Types: Cigarettes     Start date: 3/1/1999     Quit date: 3/1/2019     Years since quittin.0     Passive exposure: Never    Smokeless tobacco: Never   Substance Use Topics    Alcohol use: Never     Comment: 0     FAMILY HISTORY:  Family History   Problem Relation Name Age of Onset    Diabetes Mother      Hypertension Father      Heart disease Father      Hypertension Brother      Heart attack Brother         REVIEW OF SYSTEMS:  Please refer to RN note.    PHYSICAL EXAMINATION:  LMP  (LMP Unknown)   Physical Exam   Gen: well appearing NAD  Breast: well healed incision lower inner quadrant left breast, minimal residual radiation hyperpigmentation, healing incision from most recent surgery left UIQ.  No palpable masses in bilateral breast  No axillary of SCV HAILEY BL     CTCAE (v5) ADVERSE EVENTS:      PERFORMANCE STATUS:  KPS/ECO, Able to carry on normal activity; minor signs or symptoms of disease (ECOG equivalent 0)    LABORATORY AND IMAGING DATA:  Imaging: All imaging was personally reviewed and interpreted in clinic. Findings as per HPI and EMR.       Laboratory/Pathology:  All pertinent labs and pathology were personally reviewed and interpreted in clinic. Findings as per HPI and EMR.      IMPRESSION:  Ms. Biacna Cornell is a 70-year-old with history of LEFT breast cancer in LIQ pT1aN0 IDC ER+VT+Isl4lud treated with breast conservation surgery followed by APBI 30 Gy in 5 fractions in 2024 now with a new (versus less likely recurrence) left breast UIQ pT1bNx IDC ER+VT+Fkm1geaymbjt (1.2 cm IDC, grade 2, no LVSI, margins negative, oncotype dx 22) treated with partial  mastectomy.  She presents to discuss radiation therapy options.       Ata likely has a new primary tumor that was previously mammographically occult (versus less likely recurrence but far from site of  disease in 2024).  We discussed adjuvant therapy options for her likely new pT1bNx IDC ER+NE+Eeb2yxwydbfp which include re-irradiation or endocrine therapy alone.  As her prior radiation course was completed < 1 year ago, I am hesitant to proceed with a second course of radiation therapy.  There would certainly be overlap with her prior radiation field.  Our Case re-irradiation protocol requires a 3 year interval between courses of radiation.  She also does not want another course of radiation therapy.     Therefore, I favor endocrine therapy alone alone.  She is nervous about side effects of endocrine therapy and I encouraged her to discuss this with Dr Baugh when she sees him next week.     PLAN:  - Given < 1 year interval to prior left breast radiation, do no favor additional radiation therapy at this time  - As she is > 72 yo and ER+NE+ Her2- disease, endocrine therapy is an alternative option.  She will meet Dr Baugh next week  - All questions answered.    Thank you for the opportunity to participate in the care of this kind patient.    Clemencia Nina MD  Radiation Oncology

## 2025-04-02 ENCOUNTER — APPOINTMENT (OUTPATIENT)
Dept: HEMATOLOGY/ONCOLOGY | Facility: CLINIC | Age: 70
End: 2025-04-02
Payer: COMMERCIAL

## 2025-04-02 ENCOUNTER — HOSPITAL ENCOUNTER (OUTPATIENT)
Dept: RADIATION ONCOLOGY | Facility: CLINIC | Age: 70
Setting detail: RADIATION/ONCOLOGY SERIES
Discharge: HOME | End: 2025-04-02
Payer: COMMERCIAL

## 2025-04-02 VITALS
HEIGHT: 66 IN | RESPIRATION RATE: 18 BRPM | HEART RATE: 73 BPM | BODY MASS INDEX: 34.62 KG/M2 | TEMPERATURE: 97.9 F | DIASTOLIC BLOOD PRESSURE: 82 MMHG | SYSTOLIC BLOOD PRESSURE: 140 MMHG | OXYGEN SATURATION: 95 % | WEIGHT: 215.39 LBS

## 2025-04-02 DIAGNOSIS — C50.412 MALIGNANT NEOPLASM OF UPPER-OUTER QUADRANT OF LEFT BREAST IN FEMALE, ESTROGEN RECEPTOR POSITIVE: ICD-10-CM

## 2025-04-02 DIAGNOSIS — Z17.0 MALIGNANT NEOPLASM OF UPPER-OUTER QUADRANT OF LEFT BREAST IN FEMALE, ESTROGEN RECEPTOR POSITIVE: ICD-10-CM

## 2025-04-02 PROCEDURE — 99215 OFFICE O/P EST HI 40 MIN: CPT | Performed by: RADIOLOGY

## 2025-04-02 ASSESSMENT — PATIENT HEALTH QUESTIONNAIRE - PHQ9
2. FEELING DOWN, DEPRESSED OR HOPELESS: SEVERAL DAYS
1. LITTLE INTEREST OR PLEASURE IN DOING THINGS: SEVERAL DAYS
SUM OF ALL RESPONSES TO PHQ9 QUESTIONS 1 AND 2: 2
10. IF YOU CHECKED OFF ANY PROBLEMS, HOW DIFFICULT HAVE THESE PROBLEMS MADE IT FOR YOU TO DO YOUR WORK, TAKE CARE OF THINGS AT HOME, OR GET ALONG WITH OTHER PEOPLE: SOMEWHAT DIFFICULT

## 2025-04-02 ASSESSMENT — ENCOUNTER SYMPTOMS
WOUND: 1
FATIGUE: 1

## 2025-04-02 ASSESSMENT — PAIN SCALES - GENERAL: PAINLEVEL_OUTOF10: 0-NO PAIN

## 2025-04-03 ENCOUNTER — SOCIAL WORK (OUTPATIENT)
Dept: CASE MANAGEMENT | Facility: HOSPITAL | Age: 70
End: 2025-04-03
Payer: COMMERCIAL

## 2025-04-03 NOTE — PROGRESS NOTES
Distress Thermometer Referral    Distress Score: 5  Distress Concerns: none listed  Meeting Location: phone  Person(s) Present: patient and SW  Impression and Plan: Patient rated herself a 5 due to distress of having cancer come back so soon and other health issues  Interventions Provided: Explained social work services, gave patient this writer's contact information, supportive listening    SW will continue to follow as needed. DAQUAN Mcfadden, [phone]

## 2025-04-08 ENCOUNTER — OFFICE VISIT (OUTPATIENT)
Dept: HEMATOLOGY/ONCOLOGY | Facility: CLINIC | Age: 70
End: 2025-04-08
Payer: COMMERCIAL

## 2025-04-08 VITALS
HEART RATE: 77 BPM | SYSTOLIC BLOOD PRESSURE: 128 MMHG | BODY MASS INDEX: 35.2 KG/M2 | WEIGHT: 218.48 LBS | TEMPERATURE: 98.2 F | DIASTOLIC BLOOD PRESSURE: 70 MMHG | RESPIRATION RATE: 16 BRPM | OXYGEN SATURATION: 97 %

## 2025-04-08 DIAGNOSIS — Z17.0 MALIGNANT NEOPLASM OF UPPER-OUTER QUADRANT OF LEFT BREAST IN FEMALE, ESTROGEN RECEPTOR POSITIVE: ICD-10-CM

## 2025-04-08 DIAGNOSIS — C50.412 MALIGNANT NEOPLASM OF UPPER-OUTER QUADRANT OF LEFT BREAST IN FEMALE, ESTROGEN RECEPTOR POSITIVE: ICD-10-CM

## 2025-04-08 PROCEDURE — 99214 OFFICE O/P EST MOD 30 MIN: CPT | Performed by: STUDENT IN AN ORGANIZED HEALTH CARE EDUCATION/TRAINING PROGRAM

## 2025-04-08 PROCEDURE — 1036F TOBACCO NON-USER: CPT | Performed by: STUDENT IN AN ORGANIZED HEALTH CARE EDUCATION/TRAINING PROGRAM

## 2025-04-08 PROCEDURE — 1126F AMNT PAIN NOTED NONE PRSNT: CPT | Performed by: STUDENT IN AN ORGANIZED HEALTH CARE EDUCATION/TRAINING PROGRAM

## 2025-04-08 PROCEDURE — 3074F SYST BP LT 130 MM HG: CPT | Performed by: STUDENT IN AN ORGANIZED HEALTH CARE EDUCATION/TRAINING PROGRAM

## 2025-04-08 PROCEDURE — 1159F MED LIST DOCD IN RCRD: CPT | Performed by: STUDENT IN AN ORGANIZED HEALTH CARE EDUCATION/TRAINING PROGRAM

## 2025-04-08 PROCEDURE — 3078F DIAST BP <80 MM HG: CPT | Performed by: STUDENT IN AN ORGANIZED HEALTH CARE EDUCATION/TRAINING PROGRAM

## 2025-04-08 ASSESSMENT — PAIN SCALES - GENERAL: PAINLEVEL_OUTOF10: 0-NO PAIN

## 2025-04-08 NOTE — PROGRESS NOTES
Patient ID: Bianca Cornell is a 70 y.o. female.    The patient presents to clinic today for her history of breast cancer.     Cancer Staging   Malignant neoplasm of central portion of left breast in female, estrogen receptor positive  Staging form: Breast, AJCC 8th Edition  - Pathologic: Stage IA (pT1a, pN0(sn), cM0, G2, ER+, MN+, HER2-) - Signed by Constance Sweeney DO on 3/22/2024    Malignant neoplasm of upper-inner quadrant of left breast in female, estrogen receptor positive  Staging form: Breast, AJCC 8th Edition  - Pathologic: pT1b, cN0, cM0, G2, ER+, MN+, HER2- - Signed by Constance Sweeney DO on 3/24/2025        Diagnostic/Therapeutic History:    - 01/22/2022: excisional biopsy by Dr Kendrick for right atypical ductal hyperplasia    - On 01/11/204: Diagnostic imaging with bl Diagnostic mammogram and US showed a 0.4 cm mass at 9 oclock 5 CFN, 3 morphologically normal axillary LN    - On 01/12/2024:  Left breast stereotactic guided core needle biopsy showed IDC G2 ER: 95%, MN: 95%, HER2 negative (0)    - On 03/01/2024: Dr Sweeney left PM with SLNB (0/2)  showed a 4mm focus G2 final stage [id number] ER: 95%, MN: 95%, HER2 negative     - On 04/26/2024: completed radiation therapy     - 01/13/2025: Bl Mammogram: Interval postlumpectomy changes are visualized in the inferior medial left breast at middle depth with postsurgical scarring and surgical clips. A mass is visualized in the superomedial left breast at  posterior depth which will be further evaluated on same-day diagnostic ultrasound. On Ultrasound: At 10 o'clock 15 cm from the nipple, there is an irregular hypoechoic mass measuring 0.4 x 0.2 x 0.3 cm. No lymphadenopathy with 6 morphologically normal lymph nodes identified.    -01/24/2025: Left breast mass, 10 o'clock, `5 CFN showed IDC G2  ER: 95%, MN: 41-50%, HER2 negative (1+)    -02/27/2025: Dr Sweeney performed a left PM with path showing a grade 2 invasive ductal carcinoma. Did show DCIS G2, solid.  Margins negative.  Final stage pT1bNx ER: 95%, W[place]: 41-50%, HER2 negative (1+)        History of Present Illness (HPI)/Interval History:  Doing well - Healing well from surgery- does have diffuse joint achiness    She denies any fevers or chills.    She denies any chest pain or breathing issues.     She reports a normal appetite and normal bowel movements. Her weight is stable.     She denies any issues with sleep or fatigue.       PMH: Hypogammaglobunemia, has recurrent infections on suppressive antibiotics,    PSH: left shoulder surgery, hx of Necrotizing fascitis in 2019 s/p debridement    Allergies & Meds: reviewed    Reproductive hx: Menarche at age 15, , AFLB: 18.  no OCP, no HRT, menopause at 51    Family history: negative     Review of Systems:  14-point ROS otherwise negative, as per HPI.    Past Medical History:   Diagnosis Date    Atypical ductal hyperplasia, breast     Breast cancer 2023    left    Breast cancer 2023    Hypertension     Hypogammaglobulinemia (Multi)     Necrotizing fasciitis (Multi)     right thigh     Personal history of irradiation 2023    Personal history of other diseases of the female genital tract     History of endometriosis    PONV (postoperative nausea and vomiting)     Unspecified benign mammary dysplasia of unspecified breast 2021    Atypical ductal hyperplasia of breast       Past Surgical History:   Procedure Laterality Date    APPENDECTOMY  2013    Appendectomy    BI MAMMO GUIDED BREAST RIGHT LOCALIZATION Right 2022    BI MAMMO GUIDED LOCALIZATION BREAST RIGHT 2022 [facility]    BREAST BIOPSY      BREAST LUMPECTOMY  2023    ENDOMETRIAL ABLATION      OTHER SURGICAL HISTORY  2021    Tubal ligation    SHOULDER SURGERY      TUBAL LIGATION         Social History     Socioeconomic History    Marital status:    Tobacco Use    Smoking status: Former     Current packs/day: 0.00     Types: Cigarettes      Start date: 3/1/1999     Quit date: 3/1/2019     Years since quittin.1     Passive exposure: Never    Smokeless tobacco: Never   Vaping Use    Vaping status: Never Used   Substance and Sexual Activity    Alcohol use: Never     Comment: 0    Drug use: Yes     Types: Marijuana     Comment: occas use of marijuana       Allergies   Allergen Reactions    Clindamycin Nausea/vomiting     Oral - caused pancreatitis per pt    Dexilant [Dexlansoprazole] Nausea/vomiting     caused pancreatitis per pt    Dilaudid [Hydromorphone] GI Upset    Flagyl [Metronidazole] Nausea/vomiting     caused pancreatitis per pt    Lisinopril Nausea/vomiting     caused pancreatitis per pt    Morphine Rash and Nausea/vomiting         Current Outpatient Medications:     Bacillus subtilis-inulin 1.5 billion cell-1 gram tablet,chewable, Chew 1 tablet 2 times a day., Disp: , Rfl:     calcium carbonate/vitamin D3 (CALCIUM 600 WITH VITAMIN D3 ORAL), Take 2 tablets by mouth 2 times a day., Disp: , Rfl:     clindamycin (Cleocin T) 1 % lotion, Apply 1-2x daily, Disp: 60 mL, Rfl: 11    doxycycline (Adoxa) 100 mg tablet, Take 1 tablet (100 mg) by mouth once daily. Take with a full glass of water and do not lie down for at least 30 minutes after, Disp: , Rfl:     eszopiclone (Lunesta) 1 mg tablet, Take 1 tablet (1 mg) by mouth if needed for sleep. Take immediately before bedtime, Disp: , Rfl:     hydrALAZINE (Apresoline) 25 mg tablet, Take 1 tablet (25 mg) by mouth 3 times a day., Disp: , Rfl:     multivitamin with minerals tablet, Take 1 tablet by mouth once daily., Disp: , Rfl:     omega 3-dha-epa-fish oil (Fish OiL) 1,000 mg (120 mg-180 mg) capsule, Take 1 capsule (1,000 mg) by mouth 2 times a day., Disp: , Rfl:     ondansetron (Zofran) 4 mg tablet, Take 1 tablet (4 mg) by mouth every 6 hours if needed for nausea for up to 20 doses., Disp: 20 tablet, Rfl: 0    sulfamethoxazole-trimethoprim (Bactrim DS) 800-160 mg tablet, Take 1 tablet by mouth once  daily., Disp: , Rfl:      Objective    BSA: 2.15 meters squared  /70 (BP Location: Right arm, Patient Position: Sitting, BP Cuff Size: Large adult)   Pulse 77   Temp 36.8 °C (98.2 °F) (Temporal)   Resp 16   Wt 99.1 kg (218 lb 7.6 oz)   LMP  (LMP Unknown)   SpO2 97%   BMI 35.20 kg/m²     ECOG Performance Status: 1    Physical Exam    GA: alert, oriented, cooperative  HEENT: anicteric sclera, injected conjunctiva  Heart: RRR, no murmurs  Lung: CTAB  Abd: +BS, soft, non tender  Ext: peripheral pulses positive, warm extremity  Breast: well healing incision, no new nodules or lymphadenopathy      Laboratory Data:  Lab Results   Component Value Date    WBC 9.7 02/13/2025    HGB 12.8 02/13/2025    HCT 41.2 02/13/2025    MCV 91 02/13/2025     02/13/2025       Chemistry    Lab Results   Component Value Date/Time     (L) 02/13/2025 0956    K 4.3 02/13/2025 0956     02/13/2025 0956    CO2 25 02/13/2025 0956    BUN 20 02/13/2025 0956    CREATININE 0.73 02/13/2025 0956    Lab Results   Component Value Date/Time    CALCIUM 9.6 02/13/2025 0956    ALKPHOS 71 01/24/2024 0952    AST 15 01/24/2024 0952    ALT 18 01/24/2024 0952    BILITOT 0.4 01/24/2024 0952             Radiology:  ECG 12 lead (Ancillary Performed)  Normal sinus rhythm  Low voltage QRS  Borderline ECG  Confirmed by Aidan Negron ([id number]) on 3/2/2025 8:52:02 PM       BI mammo left diagnostic tomosynthesis 01/11/2024  BI US breast limited left 01/11/2024    Narrative  Interpreted By:  Salima Mendoza  and Lucien Jacobo  STUDY:  BI MAMMO LEFT DIAGNOSTIC TOMOSYNTHESIS; BI US BREAST LIMITED LEFT;  1/11/2024 11:59 am; 1/11/2024 12:32 pm    ACCESSION NUMBER(S):  [id number]; [id number]    ORDERING CLINICIAN:  ILIANA BEE    INDICATION:  The patient was recalled from recent screening mammogram 01/11/2024  for a left breast mass.    COMPARISON:  01/11/2024, 08/30/2022, 01/21/2022.    FINDINGS:  MAMMOGRAPHY: 2D and tomosynthesis images were  reviewed at 1 mm slice  thickness.    Density:  The breast tissue is heterogeneously dense, which may  obscure small masses.    Spot compression views demonstrate an irregular spiculated equal  density mass in the central medial left breast at anterior depth.    ULTRASOUND:  A targeted ultrasound of the left breast and axilla was  performed using elastography.    An irregular not parallel hypoechoic mass with angular margins and  posterior shadowing is seen at 9 o'clock 5 cm from the nipple. The  mass measures 0.3 x 0.2 x 0.4 cm. It is avascular and soft on  elastography. This corresponds with the mammographic left breast mass.    A targeted ultrasound of the left axilla demonstrates 3  morphologically normal axillary lymph nodes.    Impression  Suspicious left breast mass without axillary lymphadenopathy. Further  evaluation with surgical consultation and ultrasound-guided biopsy is  recommended. The patient is scheduled to see Lora Smith in  clinic today to discuss the findings and recommendations. A message  was sent to the referring practitioner at the time of this dictation  regarding these critical findings using the Epic notification system.  A pre-procedure form was filled out.    Method of Detection: Category Sdbt - 3D Screening    BI-RADS CATEGORY:    BI-RADS Category:  4 Suspicious.  Recommendation:  Biopsy.  Recommended Date:  Immediate.  Laterality:  Left.    For any future breast imaging appointments, please call [phone]).    I personally reviewed the images/study and I agree with the findings  as stated by fellow physician, Dr. Donnell Mcgraw.      MACRO:  Critical Finding:  Breast Imaging Abnormality. Notification was  initiated on 1/11/2024 at 12:35 pm by  Donnell Mcgraw.  (**-YCF-**)  Instructions:  See Impression for specific recommendations.    Signed by: Salima Mendoza 1/11/2024 1:00 PM  Dictation workstation:   [id number]          Assessment/Plan:    70 year old female patient with  hx of hypogammaglobunemia, has has mutiple infections in the past including Nec Fascitis s/p multiple surgeries    - 01/22/2022: excisional biopsy by Dr Kendrick for right atypical ductal hyperplasia    - On 01/11/204: Diagnostic imaging with bl Diagnostic mammogram and US showed a 0.4 cm mass at 9 oclock 5 CFN, 3 morphologically normal axillary LN    - On 01/12/2024:  Left breast stereotactic guided core needle biopsy showed IDC G2 ER: 95%, AL: 95%, HER2 negative (0)    - On 03/01/2024: Dr Sweeney left PM with SLNB (0/2)  showed a 4mm focus G2 final stage [id number] ER: 95%, AL: 95%, HER2 negative     I reviewed with her the events that led to her diagnosis of breast cancer. We reviewed all the procedures and diagnostic imaging she underwent thus far. I discussed the features of her breast cancer that include the size, grade, lymph node status and hormone receptor/ her2-caryn status.     Completed radiation therapy- deferring hormonal therapy because she was concerned  of side effects. On active surveillance    - 01/13/2025: Bl Mammogram: Interval postlumpectomy changes are visualized in the inferior medial left breast at middle depth with postsurgical scarring and surgical clips. A mass is visualized in the superomedial left breast at  posterior depth which will be further evaluated on same-day diagnostic ultrasound. On Ultrasound: At 10 o'clock 15 cm from the nipple, there is an irregular hypoechoic mass measuring 0.4 x 0.2 x 0.3 cm. No lymphadenopathy with 6 morphologically normal lymph nodes identified.    -01/24/2025: Left breast mass, 10 o'clock, `5 CFN showed IDC G2  ER: 95%, AL: 41-50%, HER2 negative (1+)    -02/27/2025: Dr Sweeney performed a left PM with path showing a 7 mm grade 2 invasive ductal carcinoma. Did show DCIS G2, solid. Margins negative.  Final stage pT1bNx ER: 95%, AL: 41-50%, HER2 negative (1+)- oncotype DX score of 22- with 8% distant recurrence risk at 9 years with AI    Likely new primary BC-  met with Dr Nina with no plan for radiation therapy    - Discussed adjuvant anastrozole: nervous about side effects of arthralgias/myalgias and osteoporosis. Discussed adjuvant tamoxifen, she is nervous about risk of VTE and cardiovascular events. I did encourage her to seriously think about it given that it's her second breast cancer.    She will update us in few weeks  Fup in   Genetics referral.    At least 35 minutes of direct consultation was spent reviewing the patient's chart as well as discussing and  reviewing the  cancer care plan including educating and answering questions and concerns, greater than 50 percent spent in counseling and coordination of care.         Naukl Baugh MD  Hematology and Medical Oncology  [facility]

## 2025-06-03 ENCOUNTER — HOSPITAL ENCOUNTER (OUTPATIENT)
Dept: RADIATION ONCOLOGY | Facility: CLINIC | Age: 70
Setting detail: RADIATION/ONCOLOGY SERIES
Discharge: HOME | End: 2025-06-03
Payer: COMMERCIAL

## 2025-06-03 VITALS
SYSTOLIC BLOOD PRESSURE: 170 MMHG | TEMPERATURE: 96.6 F | DIASTOLIC BLOOD PRESSURE: 77 MMHG | WEIGHT: 215.61 LBS | BODY MASS INDEX: 34.73 KG/M2 | OXYGEN SATURATION: 96 % | RESPIRATION RATE: 18 BRPM | HEART RATE: 72 BPM

## 2025-06-03 DIAGNOSIS — Z17.0 MALIGNANT NEOPLASM OF CENTRAL PORTION OF LEFT BREAST IN FEMALE, ESTROGEN RECEPTOR POSITIVE: Primary | ICD-10-CM

## 2025-06-03 DIAGNOSIS — C50.112 MALIGNANT NEOPLASM OF CENTRAL PORTION OF LEFT BREAST IN FEMALE, ESTROGEN RECEPTOR POSITIVE: Primary | ICD-10-CM

## 2025-06-03 DIAGNOSIS — Z17.0 MALIGNANT NEOPLASM OF UPPER-INNER QUADRANT OF LEFT BREAST IN FEMALE, ESTROGEN RECEPTOR POSITIVE: ICD-10-CM

## 2025-06-03 DIAGNOSIS — C50.212 MALIGNANT NEOPLASM OF UPPER-INNER QUADRANT OF LEFT BREAST IN FEMALE, ESTROGEN RECEPTOR POSITIVE: ICD-10-CM

## 2025-06-03 PROCEDURE — 99214 OFFICE O/P EST MOD 30 MIN: CPT | Performed by: NURSE PRACTITIONER

## 2025-06-03 RX ORDER — FERROUS SULFATE 325(65) MG
325 TABLET ORAL
COMMUNITY

## 2025-06-03 ASSESSMENT — PAIN SCALES - GENERAL: PAINLEVEL_OUTOF10: 6

## 2025-06-03 NOTE — PROGRESS NOTES
Radiation Oncology Follow-Up    Patient Name:  Bianca Cornell  MRN:  [id number]  :  1955    Referring Provider: No ref. provider found  Primary Care Provider: Isaiah Denise DO  Care Team: Patient Care Team:  Isaiah Denise DO as PCP - General (Allergy and Immunology)  Nakul Baugh MD as Consulting Physician (Hematology and Oncology)    Date of Service: 6/3/2025      Cancer Staging   Malignant neoplasm of central portion of left breast in female, estrogen receptor positive (Multi)  Staging form: Breast, AJCC 8th Edition  - Pathologic: Stage IA (pT1a, pN0(sn), cM0, G2, ER+, NH+, HER2-) - Signed by Constance Sweeney DO on 3/22/2024     Diagnostic/Therapeutic History:     - 2022: excisional biopsy by Dr Kendrick for right atypical ductal hyperplasia     - On : Diagnostic imaging with bl Diagnostic mammogram and US showed a 0.4 cm mass at 9 oclock 5 CFN, 3 morphologically normal axillary LN     - On 2024:  Left breast stereotactic guided core needle biopsy showed IDC G2 ER: 95%, NH: 95%, HER2 negative (0)     - On 2024: Dr Sweeney left PM with SLNB (0/2)  showed a 4mm focus G2 final stage [id number] ER: 95%, NH: 95%, HER2 negative      - 24 - 2024: partial breast radiation therapy to left breast consisting of a dose of 30 Gy given in 5 fractions of 6 Gy per fraction.      - Pt declined adjuvant endocrine therapy    - 2025: Bl Mammogram: Interval postlumpectomy changes are visualized in the inferior medial left breast at middle depth with postsurgical scarring and surgical clips. A mass is visualized in the superomedial left breast at posterior depth which will be further evaluated on same-day diagnostic ultrasound. On Ultrasound: At 10 o'clock 15 cm from the nipple, there is an irregular hypoechoic mass measuring 0.4 x 0.2 x 0.3 cm. No lymphadenopathy with 6 morphologically normal lymph nodes identified.     -2025: Left breast mass, 10 o'clock, `5 CFN  showed IDC G2  ER: 95%, SD: 41-50%, HER2 negative (1+)     -02/27/2025: Dr Sweeney performed a left PM with path showing a grade 2 invasive ductal carcinoma. Did show DCIS G2, solid. Margins negative.  Final stage pT1bNx ER: 95%, SD: 41-50%, HER2 negative (1+) Oncotype dx 22 with 8% distant recurrence risk at 9 years with AI     SUBJECTIVE  History of Present Illness:   Bianca Cornell is here today for routine radiation follow up/surveillance visit.  She is doing fairly well.  Since her last visit, she has developed a new breast cancer in left breast s/p lumpectomy.  Referred to radiation however she is only 1 yr s/p partial breast radiation and not advised per Dr. Nina.  She has declined adjuvant endocrine therapy - she is very sensitive to medications and does not want the side effects.  She is trying to eat healthier and exercise more. No headaches, fever, chills, cough, SOB, chest pain, n/v/d/c or bony pain.  She has chronic insomnia and taking Lunesta prn.      Review of Systems:    Review of Systems   All other systems reviewed and are negative.    Performance Status:   The Karnofsky performance scale today is 90, Able to carry on normal activity; minor signs or symptoms of disease (ECOG equivalent 0).      OBJECTIVE  Vital Signs:  LMP  (LMP Unknown)      Current Outpatient Medications:     Bacillus subtilis-inulin 1.5 billion cell-1 gram tablet,chewable, Chew 1 tablet 2 times a day., Disp: , Rfl:     calcium carbonate/vitamin D3 (CALCIUM 600 WITH VITAMIN D3 ORAL), Take 2 tablets by mouth 2 times a day., Disp: , Rfl:     clindamycin (Cleocin T) 1 % lotion, Apply 1-2x daily, Disp: 60 mL, Rfl: 11    doxycycline (Adoxa) 100 mg tablet, Take 1 tablet (100 mg) by mouth once daily. Take with a full glass of water and do not lie down for at least 30 minutes after, Disp: , Rfl:     eszopiclone (Lunesta) 1 mg tablet, Take 1 tablet (1 mg) by mouth if needed for sleep. Take immediately before bedtime, Disp: , Rfl:      hydrALAZINE (Apresoline) 25 mg tablet, Take 1 tablet (25 mg) by mouth 3 times a day., Disp: , Rfl:     multivitamin with minerals tablet, Take 1 tablet by mouth once daily., Disp: , Rfl:     omega 3-dha-epa-fish oil (Fish OiL) 1,000 mg (120 mg-180 mg) capsule, Take 1 capsule (1,000 mg) by mouth 2 times a day., Disp: , Rfl:     ondansetron (Zofran) 4 mg tablet, Take 1 tablet (4 mg) by mouth every 6 hours if needed for nausea for up to 20 doses., Disp: 20 tablet, Rfl: 0    sulfamethoxazole-trimethoprim (Bactrim DS) 800-160 mg tablet, Take 1 tablet by mouth once daily., Disp: , Rfl:      Physical Exam  Vitals reviewed.   Constitutional:       Appearance: Normal appearance.   HENT:      Head: Normocephalic and atraumatic.      Nose: Nose normal.      Mouth/Throat:      Mouth: Mucous membranes are dry.   Eyes:      Conjunctiva/sclera: Conjunctivae normal.      Pupils: Pupils are equal, round, and reactive to light.   Cardiovascular:      Rate and Rhythm: Normal rate and regular rhythm.   Pulmonary:      Breath sounds: Normal breath sounds.   Chest:   Breasts:     Right: No swelling, inverted nipple, mass, nipple discharge or skin change.      Left: No swelling, inverted nipple, mass or nipple discharge.          Comments: Left  breast with tissue loss, smaller than right.   Abdominal:      Palpations: Abdomen is soft.   Musculoskeletal:         General: No swelling. Normal range of motion.      Cervical back: Normal range of motion and neck supple.   Lymphadenopathy:      Cervical: No cervical adenopathy.      Upper Body:      Right upper body: No supraclavicular or axillary adenopathy.      Left upper body: No supraclavicular or axillary adenopathy.   Skin:     General: Skin is warm and dry.   Neurological:      General: No focal deficit present.      Mental Status: She is alert and oriented to person, place, and time.   Psychiatric:         Mood and Affect: Mood normal.         Behavior: Behavior normal.        Narrative & Impression   Interpreted By:  Minor Hughes and Marshall Colin   STUDY:  BI MAMMO BILATERAL DIAGNOSTIC TOMOSYNTHESIS; BI US BREAST LIMITED  LEFT;  1/13/2025 11:16 am; 1/13/2025 11:52 am      ACCESSION NUMBER(S):  [id number]; [id number]      ORDERING CLINICIAN:  NANCI ARREGUIN      INDICATION:  Annual mammogram. History of a left lumpectomy with radiation in  March 2024. History of a benign right excisional biopsy.      ,C50.112 Malignant neoplasm of central portion of left female  breast,Z17.0 Estrogen receptor positive status (ER+); ,R92.8 Other  abnormal and inconclusive findings on diagnostic imaging of breast      COMPARISON:  02/23/2024 and additional exams dating back to 06/25/2021.      FINDINGS:  MAMMOGRAPHY: 2D and tomosynthesis images were reviewed at 1 mm slice  thickness.      Density:  The breasts are heterogeneously dense, which may obscure  small masses.      Interval postlumpectomy changes are visualized in the inferior medial  left breast at middle depth with postsurgical scarring and surgical  clips. A mass is visualized in the superomedial left breast at  posterior depth which will be further evaluated on same-day  diagnostic ultrasound. No suspicious masses or calcifications are  identified in the right breast.      ULTRASOUND: Targeted ultrasound was performed of the left breast by a  registered sonographer with elastography.      At 9 o'clock 6 cm from the nipple, there is benign postsurgical  changes with fluid attenuation and scarring compatible with the  patient's post lumpectomy site. The area is avascular and is soft on  elastography.      At 10 o'clock 15 cm from the nipple, there is an irregular hypoechoic  mass measuring 0.4 x 0.2 x 0.3 cm which demonstrates internal  vascularity and is intermediate stiffness on elastography. This  corresponds to the mass seen on same day mammogram.      IMPRESSION:  Indeterminate left breast mass. Further evaluation with  surgical  consultation and ultrasound-guided biopsy is recommended.  Additionally, recommend ultrasound evaluation of the left axilla  prior to the biopsy. The patient has a same-day appointment with Dr. Constance Sweeney who will discuss the findings and recommendations with  the patient. A pre-procedure form was filled out.      Method of Detection: Category Sdbt - 3D Screening      Interval benign left breast postlumpectomy changes. No mammographic  evidence of malignancy in the right breast.      BI-RADS CATEGORY:  BI-RADS Category:  4 Suspicious.  Recommendation:  Surgical Consultation and Biopsy.  Recommended Date:  Immediate.  Laterality:  Left.         Final 1/24/2025 10:43 1/24/2025 11:47     Addendum    Interpreted By:  Kaylyn Enrique,   ADDENDUM:  The histopathologic interpretation of the left breast mass is  invasive ductal carcinoma, preliminary histologic grade 2. This is  concordant. Surgical consultation is recommended.      Estimated extent of disease: The mass measures approximately 4 x 2 x  3 mm.      Ipsilateral axilla: No lymphadenopathy with 6 morphologically normal  lymph nodes identified.      MRI recommendations: At the discretion of the referring surgeon.      Signed by: Kaylyn Enrique 1/29/2025 8:19 AM     ASSESSMENT/PLAN:  70 y.o. female with early stage left breast cancer s/p breast conserving surgery followed by radiation. Now with 2nd breast cancer left breast s/p lumpectomy.    Continued routine follow up with Dr. Baugh/med onc rehana.     Mammogram and FUV with Dr. Sweeney.      Radiation follow up in 6 mo.  Call with any questions or concerns.     Abby Milan CNP

## 2025-06-09 LAB — SCAN RESULT: NORMAL

## 2025-07-08 ENCOUNTER — APPOINTMENT (OUTPATIENT)
Dept: HEMATOLOGY/ONCOLOGY | Facility: CLINIC | Age: 70
End: 2025-07-08
Payer: COMMERCIAL

## 2025-07-15 ENCOUNTER — OFFICE VISIT (OUTPATIENT)
Dept: HEMATOLOGY/ONCOLOGY | Facility: CLINIC | Age: 70
End: 2025-07-15
Payer: COMMERCIAL

## 2025-07-15 VITALS
WEIGHT: 211.6 LBS | SYSTOLIC BLOOD PRESSURE: 172 MMHG | TEMPERATURE: 98.1 F | OXYGEN SATURATION: 97 % | DIASTOLIC BLOOD PRESSURE: 77 MMHG | RESPIRATION RATE: 18 BRPM | HEART RATE: 79 BPM | BODY MASS INDEX: 34.09 KG/M2

## 2025-07-15 DIAGNOSIS — C50.412 MALIGNANT NEOPLASM OF UPPER-OUTER QUADRANT OF LEFT BREAST IN FEMALE, ESTROGEN RECEPTOR POSITIVE: ICD-10-CM

## 2025-07-15 DIAGNOSIS — Z17.0 MALIGNANT NEOPLASM OF UPPER-OUTER QUADRANT OF LEFT BREAST IN FEMALE, ESTROGEN RECEPTOR POSITIVE: ICD-10-CM

## 2025-07-15 PROCEDURE — 3077F SYST BP >= 140 MM HG: CPT | Performed by: STUDENT IN AN ORGANIZED HEALTH CARE EDUCATION/TRAINING PROGRAM

## 2025-07-15 PROCEDURE — 1159F MED LIST DOCD IN RCRD: CPT | Performed by: STUDENT IN AN ORGANIZED HEALTH CARE EDUCATION/TRAINING PROGRAM

## 2025-07-15 PROCEDURE — 99214 OFFICE O/P EST MOD 30 MIN: CPT | Performed by: STUDENT IN AN ORGANIZED HEALTH CARE EDUCATION/TRAINING PROGRAM

## 2025-07-15 PROCEDURE — 1125F AMNT PAIN NOTED PAIN PRSNT: CPT | Performed by: STUDENT IN AN ORGANIZED HEALTH CARE EDUCATION/TRAINING PROGRAM

## 2025-07-15 PROCEDURE — 3078F DIAST BP <80 MM HG: CPT | Performed by: STUDENT IN AN ORGANIZED HEALTH CARE EDUCATION/TRAINING PROGRAM

## 2025-07-15 PROCEDURE — 1036F TOBACCO NON-USER: CPT | Performed by: STUDENT IN AN ORGANIZED HEALTH CARE EDUCATION/TRAINING PROGRAM

## 2025-07-15 ASSESSMENT — PAIN SCALES - GENERAL: PAINLEVEL_OUTOF10: 8

## 2025-07-15 NOTE — PROGRESS NOTES
Patient ID: Bianca Cornell is a 70 y.o. female.    The patient presents to clinic today for her history of breast cancer.     Cancer Staging   Malignant neoplasm of central portion of left breast in female, estrogen receptor positive  Staging form: Breast, AJCC 8th Edition  - Pathologic: Stage IA (pT1a, pN0(sn), cM0, G2, ER+, TX+, HER2-) - Signed by Constance Sweeney DO on 3/22/2024    Malignant neoplasm of upper-inner quadrant of left breast in female, estrogen receptor positive  Staging form: Breast, AJCC 8th Edition  - Pathologic: pT1b, cN0, cM0, G2, ER+, TX+, HER2- - Signed by Constance Sweeney DO on 3/24/2025        Diagnostic/Therapeutic History:    - 01/22/2022: excisional biopsy by Dr Kendrick for right atypical ductal hyperplasia    - On 01/11/204: Diagnostic imaging with bl Diagnostic mammogram and US showed a 0.4 cm mass at 9 oclock 5 CFN, 3 morphologically normal axillary LN    - On 01/12/2024:  Left breast stereotactic guided core needle biopsy showed IDC G2 ER: 95%, TX: 95%, HER2 negative (0)    - On 03/01/2024: Dr Sweeney left PM with SLNB (0/2)  showed a 4mm focus G2 final stage [id number] ER: 95%, TX: 95%, HER2 negative     - On 04/26/2024: completed radiation therapy     - 01/13/2025: Bl Mammogram: Interval postlumpectomy changes are visualized in the inferior medial left breast at middle depth with postsurgical scarring and surgical clips. A mass is visualized in the superomedial left breast at  posterior depth which will be further evaluated on same-day diagnostic ultrasound. On Ultrasound: At 10 o'clock 15 cm from the nipple, there is an irregular hypoechoic mass measuring 0.4 x 0.2 x 0.3 cm. No lymphadenopathy with 6 morphologically normal lymph nodes identified.    -01/24/2025: Left breast mass, 10 o'clock, `5 CFN showed IDC G2  ER: 95%, TX: 41-50%, HER2 negative (1+)    -02/27/2025: Dr Sweeney performed a left PM with path showing a grade 2 invasive ductal carcinoma. Did show DCIS G2, solid.  Margins negative.  Final stage pT1bNx ER: 95%, ME: 41-50%, HER2 negative (1+)- oncotype DX score 22      Declined endocrine therapy    History of Present Illness (HPI)/Interval History:    Does have numbness in hands that has been new (for 1 month)- does have lower extremity pain on the left side    She denies any fevers or chills.    She denies any chest pain or breathing issues.     She reports a normal appetite and normal bowel movements. Her weight is stable.     She denies any issues with sleep or fatigue.       PMH: Hypogammaglobunemia, has recurrent infections on suppressive antibiotics,    PSH: left shoulder surgery, hx of Necrotizing fascitis in 2019 s/p debridement    Allergies & Meds: reviewed    Reproductive hx: Menarche at age 15, , AFLB: 18.  no OCP, no HRT, menopause at 51    Family history: negative     Review of Systems:  14-point ROS otherwise negative, as per HPI.    Past Medical History:   Diagnosis Date    Atypical ductal hyperplasia, breast     Breast cancer 2023    left    Breast cancer 2023    Hypertension     Hypogammaglobulinemia (Multi)     Necrotizing fasciitis (Multi)     right thigh     Personal history of irradiation 2023    Personal history of other diseases of the female genital tract     History of endometriosis    PONV (postoperative nausea and vomiting)     Unspecified benign mammary dysplasia of unspecified breast 2021    Atypical ductal hyperplasia of breast       Past Surgical History:   Procedure Laterality Date    APPENDECTOMY  2013    Appendectomy    BI MAMMO GUIDED BREAST RIGHT LOCALIZATION Right 2022    BI MAMMO GUIDED LOCALIZATION BREAST RIGHT 2022 [facility]    BREAST BIOPSY      BREAST LUMPECTOMY  2023    ENDOMETRIAL ABLATION      OTHER SURGICAL HISTORY  2021    Tubal ligation    SHOULDER SURGERY      TUBAL LIGATION         Social History     Socioeconomic History    Marital status:     Tobacco Use    Smoking status: Former     Current packs/day: 0.00     Types: Cigarettes     Start date: 3/1/1999     Quit date: 3/1/2019     Years since quittin.3     Passive exposure: Never    Smokeless tobacco: Never   Vaping Use    Vaping status: Never Used   Substance and Sexual Activity    Alcohol use: Never     Comment: 0    Drug use: Yes     Types: Marijuana     Comment: occas use of marijuana       Allergies   Allergen Reactions    Clindamycin Nausea/vomiting     Oral - caused pancreatitis per pt    Dexilant [Dexlansoprazole] Nausea/vomiting     caused pancreatitis per pt    Dilaudid [Hydromorphone] GI Upset    Flagyl [Metronidazole] Nausea/vomiting     caused pancreatitis per pt    Lisinopril Nausea/vomiting     caused pancreatitis per pt    Morphine Rash and Nausea/vomiting         Current Outpatient Medications:     Bacillus subtilis-inulin 1.5 billion cell-1 gram tablet,chewable, Chew 1 tablet 2 times a day., Disp: , Rfl:     calcium carbonate/vitamin D3 (CALCIUM 600 WITH VITAMIN D3 ORAL), Take 2 tablets by mouth 2 times a day., Disp: , Rfl:     clindamycin (Cleocin T) 1 % lotion, Apply 1-2x daily, Disp: 60 mL, Rfl: 11    doxycycline (Adoxa) 100 mg tablet, Take 1 tablet (100 mg) by mouth once daily. Take with a full glass of water and do not lie down for at least 30 minutes after, Disp: , Rfl:     eszopiclone (Lunesta) 1 mg tablet, Take 1 tablet (1 mg) by mouth if needed for sleep. Take immediately before bedtime, Disp: , Rfl:     ferrous sulfate 325 mg (65 mg elemental) tablet, Take 1 tablet by mouth 3 times daily (morning, midday, late afternoon)., Disp: , Rfl:     hydrALAZINE (Apresoline) 25 mg tablet, Take 1 tablet (25 mg) by mouth 3 times a day., Disp: , Rfl:     multivitamin with minerals tablet, Take 1 tablet by mouth once daily., Disp: , Rfl:     omega 3-dha-epa-fish oil (Fish OiL) 1,000 mg (120 mg-180 mg) capsule, Take 1 capsule (1,000 mg) by mouth 2 times a day., Disp: , Rfl:      ondansetron (Zofran) 4 mg tablet, Take 1 tablet (4 mg) by mouth every 6 hours if needed for nausea for up to 20 doses., Disp: 20 tablet, Rfl: 0    sulfamethoxazole-trimethoprim (Bactrim DS) 800-160 mg tablet, Take 1 tablet by mouth once daily., Disp: , Rfl:      Objective    BSA: 2.12 meters squared  /77 (BP Location: Right arm, Patient Position: Sitting, BP Cuff Size: Large adult) Comment: NOTIFIED PROVIDER  Pulse 79   Temp 36.7 °C (98.1 °F) (Temporal)   Resp 18   Wt 96 kg (211 lb 9.6 oz)   LMP  (LMP Unknown)   SpO2 97%   BMI 34.09 kg/m²     ECOG Performance Status: 1    Physical Exam    GA: alert, oriented, cooperative  HEENT: anicteric sclera, injected conjunctiva  Heart: RRR, no murmurs  Lung: CTAB  Abd: +BS, soft, non tender  Ext: peripheral pulses positive, warm extremity  Breast: well healing incision, no new nodules or lymphadenopathy      Laboratory Data:  Lab Results   Component Value Date    WBC 9.7 02/13/2025    HGB 12.8 02/13/2025    HCT 41.2 02/13/2025    MCV 91 02/13/2025     02/13/2025       Chemistry    Lab Results   Component Value Date/Time     (L) 02/13/2025 0956    K 4.3 02/13/2025 0956     02/13/2025 0956    CO2 25 02/13/2025 0956    BUN 20 02/13/2025 0956    CREATININE 0.73 02/13/2025 0956    Lab Results   Component Value Date/Time    CALCIUM 9.6 02/13/2025 0956    ALKPHOS 71 01/24/2024 0952    AST 15 01/24/2024 0952    ALT 18 01/24/2024 0952    BILITOT 0.4 01/24/2024 0952             Radiology:  ECG 12 lead (Ancillary Performed)  Normal sinus rhythm  Low voltage QRS  Borderline ECG  Confirmed by Aidan Negron ([id number]) on 3/2/2025 8:52:02 PM       BI mammo left diagnostic tomosynthesis 01/11/2024  BI US breast limited left 01/11/2024    Narrative  Interpreted By:  Salima Mendoza,  and Lucien Jacobo  STUDY:  BI MAMMO LEFT DIAGNOSTIC TOMOSYNTHESIS; BI US BREAST LIMITED LEFT;  1/11/2024 11:59 am; 1/11/2024 12:32 pm    ACCESSION NUMBER(S):  [id number];  [id number]    ORDERING CLINICIAN:  ILIANA BEE    INDICATION:  The patient was recalled from recent screening mammogram 01/11/2024  for a left breast mass.    COMPARISON:  01/11/2024, 08/30/2022, 01/21/2022.    FINDINGS:  MAMMOGRAPHY: 2D and tomosynthesis images were reviewed at 1 mm slice  thickness.    Density:  The breast tissue is heterogeneously dense, which may  obscure small masses.    Spot compression views demonstrate an irregular spiculated equal  density mass in the central medial left breast at anterior depth.    ULTRASOUND:  A targeted ultrasound of the left breast and axilla was  performed using elastography.    An irregular not parallel hypoechoic mass with angular margins and  posterior shadowing is seen at 9 o'clock 5 cm from the nipple. The  mass measures 0.3 x 0.2 x 0.4 cm. It is avascular and soft on  elastography. This corresponds with the mammographic left breast mass.    A targeted ultrasound of the left axilla demonstrates 3  morphologically normal axillary lymph nodes.    Impression  Suspicious left breast mass without axillary lymphadenopathy. Further  evaluation with surgical consultation and ultrasound-guided biopsy is  recommended. The patient is scheduled to see Iliana Smith in  clinic today to discuss the findings and recommendations. A message  was sent to the referring practitioner at the time of this dictation  regarding these critical findings using the Epic notification system.  A pre-procedure form was filled out.    Method of Detection: Category Sdbt - 3D Screening    BI-RADS CATEGORY:    BI-RADS Category:  4 Suspicious.  Recommendation:  Biopsy.  Recommended Date:  Immediate.  Laterality:  Left.    For any future breast imaging appointments, please call [phone]).    I personally reviewed the images/study and I agree with the findings  as stated by fellow physician, Dr. Donnell Mcgraw.      MACRO:  Critical Finding:  Breast Imaging Abnormality. Notification  was  initiated on 1/11/2024 at 12:35 pm by  Donnell Mcgraw.  (**-YCF-**)  Instructions:  See Impression for specific recommendations.    Signed by: Salima Mendoza 1/11/2024 1:00 PM  Dictation workstation:   [id number]          Assessment/Plan:    70 year old female patient with hx of hypogammaglobunemia, has has mutiple infections in the past including Nec Fascitis s/p multiple surgeries    - 01/22/2022: excisional biopsy by Dr Kendrick for right atypical ductal hyperplasia    - On 01/11/204: Diagnostic imaging with bl Diagnostic mammogram and US showed a 0.4 cm mass at 9 oclock 5 CFN, 3 morphologically normal axillary LN    - On 01/12/2024:  Left breast stereotactic guided core needle biopsy showed IDC G2 ER: 95%, NE: 95%, HER2 negative (0)    - On 03/01/2024: Dr Sweeney left PM with SLNB (0/2)  showed a 4mm focus G2 final stage [id number] ER: 95%, NE: 95%, HER2 negative     I reviewed with her the events that led to her diagnosis of breast cancer. We reviewed all the procedures and diagnostic imaging she underwent thus far. I discussed the features of her breast cancer that include the size, grade, lymph node status and hormone receptor/ her2-caryn status.     Completed radiation therapy- deferring hormonal therapy because she was concerned  of side effects. On active surveillance    - 01/13/2025: Bl Mammogram: Interval postlumpectomy changes are visualized in the inferior medial left breast at middle depth with postsurgical scarring and surgical clips. A mass is visualized in the superomedial left breast at  posterior depth which will be further evaluated on same-day diagnostic ultrasound. On Ultrasound: At 10 o'clock 15 cm from the nipple, there is an irregular hypoechoic mass measuring 0.4 x 0.2 x 0.3 cm. No lymphadenopathy with 6 morphologically normal lymph nodes identified.    -01/24/2025: Left breast mass, 10 o'clock, `5 CFN showed IDC G2  ER: 95%, NE: 41-50%, HER2 negative (1+)    -02/27/2025: Dr Sweeney performed a  left PM with path showing a 7 mm grade 2 invasive ductal carcinoma. Did show DCIS G2, solid. Margins negative.  Final stage pT1bNx ER: 95%, AK: 41-50%, HER2 negative (1+)- oncotype DX score of 22- with 8% distant recurrence risk at 9 years with AI    Likely new primary BC- met with Dr Nina with no plan for radiation therapy    - Discussed adjuvant anastrozole: nervous about side effects of arthralgias/myalgias and osteoporosis. Discussed adjuvant tamoxifen, she is nervous about risk of VTE and cardiovascular events. I did encourage her to seriously think about it given that it's her second breast cancer.    Declined endocrine therapy  Fup in 3M  Hand numbness that is new: she will follow with her PCP- informed to reach out to us if it doesn't improve. Will discuss MRI spine   Genetics referral.    At least 35 minutes of direct consultation was spent reviewing the patient's chart as well as discussing and  reviewing the  cancer care plan including educating and answering questions and concerns, greater than 50 percent spent in counseling and coordination of care.         Nakul Baugh MD  Hematology and Medical Oncology  [facility]

## 2025-07-29 ENCOUNTER — APPOINTMENT (OUTPATIENT)
Dept: DERMATOLOGY | Facility: CLINIC | Age: 70
End: 2025-07-29
Payer: COMMERCIAL

## 2025-07-29 DIAGNOSIS — L82.1 SEBORRHEIC KERATOSIS: ICD-10-CM

## 2025-07-29 DIAGNOSIS — L57.8 PHOTOAGING OF SKIN: Primary | ICD-10-CM

## 2025-07-29 DIAGNOSIS — Z85.828 PERSONAL HISTORY OF OTHER MALIGNANT NEOPLASM OF SKIN: ICD-10-CM

## 2025-07-29 DIAGNOSIS — L73.2 HIDRADENITIS SUPPURATIVA: ICD-10-CM

## 2025-07-29 DIAGNOSIS — Z12.83 ENCOUNTER FOR SCREENING FOR MALIGNANT NEOPLASM OF SKIN: ICD-10-CM

## 2025-07-29 DIAGNOSIS — D22.5 MELANOCYTIC NEVI OF TRUNK: ICD-10-CM

## 2025-07-29 DIAGNOSIS — L81.4 LENTIGO: ICD-10-CM

## 2025-07-29 PROCEDURE — 1159F MED LIST DOCD IN RCRD: CPT | Performed by: DERMATOLOGY

## 2025-07-29 PROCEDURE — 99214 OFFICE O/P EST MOD 30 MIN: CPT | Performed by: DERMATOLOGY

## 2025-07-29 RX ORDER — CLINDAMYCIN PHOSPHATE 10 UG/ML
LOTION TOPICAL
Qty: 60 ML | Refills: 11 | Status: SHIPPED | OUTPATIENT
Start: 2025-07-29

## 2025-07-29 ASSESSMENT — DERMATOLOGY QUALITY OF LIFE (QOL) ASSESSMENT
RATE HOW BOTHERED YOU ARE BY EFFECTS OF YOUR SKIN PROBLEMS ON YOUR ACTIVITIES (EG, GOING OUT, ACCOMPLISHING WHAT YOU WANT, WORK ACTIVITIES OR YOUR RELATIONSHIPS WITH OTHERS): 4
DATE THE QUALITY-OF-LIFE ASSESSMENT WAS COMPLETED: 67415
RATE HOW BOTHERED YOU ARE BY EFFECTS OF YOUR SKIN PROBLEMS ON YOUR ACTIVITIES (EG, GOING OUT, ACCOMPLISHING WHAT YOU WANT, WORK ACTIVITIES OR YOUR RELATIONSHIPS WITH OTHERS): 4
RATE HOW BOTHERED YOU ARE BY SYMPTOMS OF YOUR SKIN PROBLEM (EG, ITCHING, STINGING BURNING, HURTING OR SKIN IRRITATION): 2
RATE HOW EMOTIONALLY BOTHERED YOU ARE BY YOUR SKIN PROBLEM (FOR EXAMPLE, WORRY, EMBARRASSMENT, FRUSTRATION): 0 - NEVER BOTHERED
RATE HOW EMOTIONALLY BOTHERED YOU ARE BY YOUR SKIN PROBLEM (FOR EXAMPLE, WORRY, EMBARRASSMENT, FRUSTRATION): 0 - NEVER BOTHERED
RATE HOW BOTHERED YOU ARE BY SYMPTOMS OF YOUR SKIN PROBLEM (EG, ITCHING, STINGING BURNING, HURTING OR SKIN IRRITATION): 2
WHAT SINGLE SKIN CONDITION LISTED BELOW IS THE PATIENT ANSWERING THE QUALITY-OF-LIFE ASSESSMENT QUESTIONS ABOUT: ACNE
WHAT SINGLE SKIN CONDITION LISTED BELOW IS THE PATIENT ANSWERING THE QUALITY-OF-LIFE ASSESSMENT QUESTIONS ABOUT: ACNE

## 2025-07-29 NOTE — Clinical Note
Hidradenitis suppurativa is a chronic and intermittently flaring condition that can occur in multiple areas including the axilla, beneath the breasts, groin and/or buttock.  There is no cure for this condition, but it can be controlled.  The goal of therapy is to limit the severity and frequency of outbreaks.  Treatments typically include a combination of topical medications with or without oral medications.     Continue hibiclens daily, clindamycin 1% lotion 2x daily and benzoyl peroxide OTC cleanser.

## 2025-07-29 NOTE — PROGRESS NOTES
Subjective     Bianca Cornell is a 70 y.o. female who presents for the following: Skin Check (Annual - LV 07/09/24 - Patient has a history of: Follicular disorder, Hidradenitis suppurativa, SK  Area(s) of concern: left lower abdomen that appeared 1 mth that is raised, itching, odd shaped/) and Hidradenitis Suppurativa (Annual - LV 07/09/24 - Patient states her Hidradenitis suppurativa has improved since prescribed Hibiclens, clindamycin 1% lotion and Neutrogena Deep Cleanse of L/R inframammary fold).     Intake Questions  Do you have any new or changing Lesions?: (Patient-Rptd) (P) Yes  Where are these new or changing lesion(s) located?: (Patient-Rptd) (P) Stomach  For patients coming in for a Follow-up Visit:  Have there been any changes in your health since your last visit?: (Patient-Rptd) (P) Breast cancer surgery in January of 2025  Are you an organ transplant recipient?: (Patient-Rptd) (P) No  Have you had or do you have a Staph Infection?: (Patient-Rptd) (P) Yes  Do you use sunscreen?: (Patient-Rptd) (P) Daily  Do you use a tanning bed?: (Patient-Rptd) (P) No  Are you trying to get pregnant?: (Patient-Rptd) (P) No  Are you on birth control?: (Patient-Rptd) (P) No  Do you have irregular menstrual cycles?: (Patient-Rptd) (P) No    Review of Systems:  No other skin or systemic complaints other than what is documented elsewhere in the note.    The following portions of the chart were reviewed this encounter and updated as appropriate:         Skin Cancer History  Biopsy Log Book  No skin cancers from Specimen Tracking.    Additional History  History of BCC - right nose    Specialty Problems          Dermatology Problems    Follicular disorder, unspecified    Melanocytic nevi of trunk    Personal history of other malignant neoplasm of skin    Other seborrheic keratosis    Hidradenitis suppurativa    Other benign neoplasm of skin, unspecified    Other melanin hyperpigmentation    Contusion        Objective    Well appearing patient in no apparent distress; mood and affect are within normal limits.    A full examination was performed including scalp, head, eyes, ears, nose, lips, neck, chest, axillae, abdomen, back, buttocks, bilateral upper extremities, bilateral lower extremities, hands, feet, fingers, toes, fingernails, and toenails. All findings within normal limits unless otherwise noted below.    Assessment/Plan   Skin Exam  1. PHOTOAGING OF SKIN  Generalized  Mottled pigmentation with telangiectasias and brown reticular macules in sun exposed areas of the body.  The risk of chronic, cumulative sun damage and risk of development of skin cancer was reviewed today.   The importance of sun protection was reviewed: including the use of a broad spectrum sunscreen of at least SPF 30 that protects against both UVA/UVB rays, with ingredients such as Zinc oxide or titanium dioxide, wearing sun protective clothing and sun avoidance. We reviewed the warning signs of non-melanoma skin cancer and ABCDEs of melanoma  Please follow up should you notice any new or changing pre-existing skin lesion.  2. HIDRADENITIS SUPPURATIVA  Left Inframammary Fold, Right Inframammary Fold  Inframammary creases and buttock atrophic scars and open and closed comedonal papules  Hidradenitis suppurativa is a chronic and intermittently flaring condition that can occur in multiple areas including the axilla, beneath the breasts, groin and/or buttock.  There is no cure for this condition, but it can be controlled.  The goal of therapy is to limit the severity and frequency of outbreaks.  Treatments typically include a combination of topical medications with or without oral medications.     Continue hibiclens daily, clindamycin 1% lotion 2x daily and benzoyl peroxide OTC cleanser.  This Visit  - Follow Up In Dermatology - Established Patient  - clindamycin (Cleocin T) 1 % lotion - Apply 1-2x daily  3. PERSONAL HISTORY OF OTHER MALIGNANT NEOPLASM OF  SKIN  Right Nasal Sidewall  Well healed scar at site of prior treatment without evidence of recurrence.  There is no evidence of recurrence on clinical examination today, reassurance was provided to the patient. The importance of sun protection was reviewed with the patient including the use of a broad spectrum sunscreen that protects against both UVA/UVB rays, with ingredients such as Zinc oxide or titanium dioxide, wearing sun protective clothing and sun avoidance. Warning signs of non-melanoma skin cancer were reviewed. ABCDEs of melanoma reviewed. Patient to f/u should they notice any new or changing pre-existing skin lesion  4. SEBORRHEIC KERATOSIS (3)  Generalized, Left Abdomen (side) - Upper, Left Breast  Brown, tan waxy macules and stuck on appearing papules and plaques  The benign nature of these skin lesions reviewed, reassure provided and no further treatment needed at this time.   These lesions can be removed, if symptomatic (itching, bleeding, rubbing on clothing, painful), otherwise removal is considered cosmetic.   5. LENTIGO (2)  Generalized, Left Parotid Area  Scattered tan macules in sun-exposed areas.  These are benign skin lesions due to sun exposure. They will darken in response to sun exposure. They should be monitored for change in size, shape or color.  These lesions can be treated cosmetically with topical creams, liquid nitrogen and a variety of lasers.  6. MELANOCYTIC NEVI OF TRUNK (2)  Left Abdomen (side) - Upper, Right Breast  Tan-brown symmetric macules and papules  Clinically benign appearing nevi, no treatment is necessary.  The importance of sun protection was reviewed: including the use of a broad spectrum sunscreen that protects against both UVA/UVB rays, with ingredients such as Zinc oxide or titanium dioxide, wearing sun protective clothing and sun avoidance.   ABCDEs of melanoma reviewed.  Please follow up should you notice any new or changing pre-existing skin lesion.  7.  ENCOUNTER FOR SCREENING FOR MALIGNANT NEOPLASM OF SKIN  Generalized  No suspicious lesions noted on examination today  The risk of chronic, cumulative sun damage and risk of development of skin cancer was reviewed today.   The importance of sun protection was reviewed: including the use of a broad spectrum sunscreen of at least SPF 30 that protects against both UVA/UVB rays, with ingredients such as Zinc oxide or titanium dioxide, wearing sun protective clothing and sun avoidance. We reviewed the warning signs of non-melanoma skin cancer and ABCDEs of melanoma  Please follow up should you notice any new or changing pre-existing skin lesion.  This Visit  - Follow Up In Dermatology - Established Patient    Follow up in 1 year for FBSE or sooner if needed.

## 2025-09-22 ENCOUNTER — APPOINTMENT (OUTPATIENT)
Dept: SURGICAL ONCOLOGY | Facility: HOSPITAL | Age: 70
End: 2025-09-22
Payer: COMMERCIAL

## 2026-03-17 ENCOUNTER — APPOINTMENT (OUTPATIENT)
Dept: NEUROLOGY | Facility: CLINIC | Age: 71
End: 2026-03-17
Payer: COMMERCIAL

## (undated) DEVICE — TOWEL PACK, STERILE, 4/PACK, BLUE

## (undated) DEVICE — Device

## (undated) DEVICE — COVER, MAYO STAND, W/PAD, 23 IN, DISPOSABLE, PLASTIC, LF, STERILE

## (undated) DEVICE — APPLICATOR, CHLORAPREP, W/ORANGE TINT, 26ML

## (undated) DEVICE — BANDAGE, GAUZE, 6 PLY, KERLIX, 2.25 IN X 3 YD, STERILE

## (undated) DEVICE — SOLUTION, IRRIGATION, STERILE WATER, 1000 ML, POUR BOTTLE

## (undated) DEVICE — ADHESIVE, SKIN, MASTISOL, 2/3 CC VIAL

## (undated) DEVICE — SOLUTION, IRRIGATION, SODIUM CHLORIDE 0.9%, 1000 ML, POUR BOTTLE

## (undated) DEVICE — STRIP, SKIN CLOSURE, STERI STRIP, REINFORCED, 0.5 X 4 IN

## (undated) DEVICE — MARKER, SKIN, DUAL TIP, W/RULER AND LABEL

## (undated) DEVICE — ELECTRODE, ELECTROSURGICAL, BLADE EXT 4 INCH, INSULATED

## (undated) DEVICE — SUTURE, VICRYL, 3-0, 27 IN, SH

## (undated) DEVICE — GLOVE, SURGICAL, PROTEXIS PI , 5.5, PF, LF

## (undated) DEVICE — SUTURE, VICRYL, 2-0, 27 IN, SH, UNDYED

## (undated) DEVICE — PROBE COVER, INTRAOPERATIVE, 13 X 244CM (5 X 96IN)

## (undated) DEVICE — SUTURE, MONOCRYL, 4-0, 27 IN, PS-2, UNDYED

## (undated) DEVICE — KIT, MARGINMARKER, 6 INK COLORS, STANDARD

## (undated) DEVICE — CONTAINER, SPECIMEN, 4 OZ, OR PEEL PACK, STERILE

## (undated) DEVICE — DRESSING, TRANSPARENT, TEGADERM, 4 X 4-3/4 IN

## (undated) DEVICE — SLEEVE, SUCTION, E-SEP, 125MM

## (undated) DEVICE — RETRACTOR, HANDHELD, 250ML, DBL ENDED